# Patient Record
Sex: MALE | Race: WHITE | Employment: FULL TIME | URBAN - METROPOLITAN AREA
[De-identification: names, ages, dates, MRNs, and addresses within clinical notes are randomized per-mention and may not be internally consistent; named-entity substitution may affect disease eponyms.]

---

## 2018-03-19 NOTE — PROGRESS NOTES
Juarez Jauregui is here for a general check up.  He is fasting. He is  up to date on eye exams (glasses) and is due for dental visits. Wears seat belt.--yes Wears bike helmet--na.  Concerns today:    ANDREW Pizarro is new to [facility]. He reports he has been in stable health over the past several years. 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 He is up to date on vaccines. Had clinical chickenpox as a child. Has male partners and has been vaccinated against Hep C but has never had Hep A series. He will start this today. Diet is healthy, meat, grains, lots of dairy.     PREP  [facility] for [facility]. Denies hx of STDs. He reports consistent condom use.     Dislocation of right ankle   He suffered right ankle injury when he was a professional dancer. He did not require surgery. Reports he now has minimal pain. Sometimes ankle pain flares with weather changes, no swelling or warmth.     Seasonal allergies  These flare in spring, summer, fall. Uses flonase nasal spray, no antihistamines. No hx of asthma.  He reports 4 sinus infections since November 2017. He is just finishing a course of amoxicillin and is feeling better today. Has not seen ENT due to insurance issues in the past. Would like a referral to ENT.     Neck pain  He notes painless clicking (annoying) of his neck and tight muscles in his neck and upper back since November 2017. No injury, feels stiff. No tension headache. Tried massage with temporary relief. Does weight lifting, no compromised activity.  Dominant right hand.     Anxiety  Had taken lorazepam on prn basis. He usually uses 15 pills in 6 months. Hx of panic attacks. Had been on zoloft in the past and had side effects, got bad dizzy spells. Does not currently see a therapist but would like a referral. Reports he was hospitalized for anxiety, panic attack in 2015. Denies illicit drug use, no heavy alcohol use.    PHQ9 score = 0  LEONOR 7 score = 3    Hx of migraine headaches  He reports 3-4 migraines per year. These began in his 20's. It starts with pain behind his left eye, aura, escalating pain, photophobia and sensitivity to sound, nausea. Triggers are poor sleep, stress. He usually takes Excedrin migraine with good relief.     Immunizations  Tdap 9/2015 (ANNIKA)  Clinical chicken pox age 2  Hep B series x 3 is completed         Health Maintenance   Topic Date Due     TETANUS IMMUNIZATION (SYSTEM ASSIGNED)  06/20/2003     INFLUENZA VACCINE (SYSTEM ASSIGNED)  09/01/2018     Patient Active Problem List   Diagnosis     Strain of neck muscle, initial encounter     Acute recurrent maxillary sinusitis     Anxiety     History of migraine     Chronic seasonal allergic rhinitis       Past Surgical History:   Procedure Laterality Date     DENTAL SURGERY         Family History   Problem Relation Age of Onset     Pre-Diabetes Mother      Parkinsonism Maternal Grandmother      Heart Failure Maternal Grandmother      Arrhythmia Maternal Grandmother      on blood thinners     CANCER Maternal Grandmother      TOB, ? esophageal     Dementia Maternal Grandfather      Prostate Cancer Maternal Grandfather        Social  Single   of [employer]    HABITS:  Tob: nonsmoker  ETOH: 2 per week  Calcium: 3+/day  Caffeine: 1/day  Exercise: 5-6 x per week    MALE ROS  Partner: male  ED: none  Contraception: condoms  STD concerns: none  BPH: no symptoms      Current Outpatient Prescriptions   Medication Sig Dispense Refill     fluticasone (FLONASE) 50 MCG/ACT nasal spray Spray 2 sprays into both nostrils daily       LORazepam (ATIVAN) 1 MG tablet Take 1 tablet (1 mg) by mouth every 8 hours as needed for anxiety 10 tablet 0     Allergies   Allergen Reactions     Bacitracin      Neosporin Af [Miconazole]          ROS  CONSTITUTIONAL:NEGATIVE for fever, chills, change in weight  INTEGUMENTARY/SKIN: NEGATIVE for worrisome  "rashes, moles or lesions  EYES: NEGATIVE for vision changes or irritation  ENT/MOUTH: NEGATIVE for ear, mouth and throat problems, seasonal allergies  RESP:NEGATIVE for significant cough or SOB  CV: NEGATIVE for chest pain, palpitations, SHERWOOD, orthopnea, PND  or peripheral edema  GI: NEGATIVE for nausea, abdominal pain, heartburn, or change in bowel habits  :NEGATIVE for frequency, dysuria, or hematuria  MUSCULOSKELETAL:shoulder and neck strain, hx of ankle pain.   NEURO: NEGATIVE for weakness, dizziness or paresthesias, positive hx of migraines  ENDOCRINE: NEGATIVE for polyuria/dipsia,  temperature intolerance, skin/hair changes  HEME/ALLERGY/IMMUNE: NEGATIVE for bleeding problems  PSYCHIATRIC: NEGATIVE for changes in mood or affect, positive hx of anxiety, as above    EXAM  /85  Pulse 62  Temp 98.2  F (36.8  C) (Oral)  Ht 5' 10.08\" (178 cm)  Wt 179 lb (81.2 kg)  SpO2 98%  BMI 25.63 kg/m2      GENERAL APPEARANCE: Alert, pleasant, NAD  EYES: PERRL, EOMI, conjunctiva clear  HENT: TM normal bilaterally. Nose and mouth without lesions  NECK: no adenopathy, thyroid normal to palpation  RESP: lungs clear to auscultation bilaterally  Axillae: no palpable axillary masses or adenopathy  CV: regular rate and rhythm, normal S1 S2, no murmur, no carotid bruits  ABDOMEN: soft, nontender, without HSM or masses. Bowel sounds normal  : not examined  Rectal exam: deferred  MS: extremities normal- no gross deformities noted, no tender, hot or swollen joints.  Point tenderness with palpation over left paracervical and left trapezius muscle  SKIN: no suspicious lesions or rashes  NEURO: Normal strength and tone, sensory exam grossly normal, DTR normoreflexive in upper and lower extremities  PSYCH: mentation appears normal. and affect normal/bright.  EXT: no peripheral edema, pedal pulses palpable    Assessment:  (Z00.00) Routine history and physical examination of adult  (primary encounter diagnosis)  Comment: healthy " 31 yo male  Plan: Lipid Profile, CBC with platelets, Basic         Metabolic Panel ([place])        Anticipatory guidance given today regarding diet, exercise, calcium intake. Vaccines are up to date but I recommended Hep A series. He agrees to this today    (F41.9) Anxiety  Comment: he is interested in seeing a counselor , he uses lorazepam sparingly  Plan: LORazepam (ATIVAN) 1 MG tablet        Gave info on [employer]. Filled 20 pills of lorazepam    (Z23) Vaccine for viral hepatitis  Comment: he has male partners  Plan: HEPA VACCINE ADULT IM        Recommend hep A series. [facility], discussed need for compliance with condom use.   Return in 6 months for second Hep A vaccine    (J01.01) Acute recurrent maxillary sinusitis  Comment: he reports 4 episodes since November  Plan: OTOLARYNGOLOGY REFERRAL        Refer to ENT for evaluation, treatment, no symptoms today    (S16.1XXA) Strain of neck muscle, initial encounter  Comment: left side  Plan: [facility], HAND, AND CHIROPRACTIC REFERRAL        Recommend myofascial work with PT, referral is entered    Danielle Mitchell MD  Internal Medicine/Pediatrics

## 2018-03-20 ENCOUNTER — OFFICE VISIT (OUTPATIENT)
Dept: FAMILY MEDICINE | Facility: CLINIC | Age: 33
End: 2018-03-20
Payer: COMMERCIAL

## 2018-03-20 VITALS
DIASTOLIC BLOOD PRESSURE: 85 MMHG | BODY MASS INDEX: 25.62 KG/M2 | HEART RATE: 62 BPM | TEMPERATURE: 98.2 F | WEIGHT: 179 LBS | HEIGHT: 70 IN | SYSTOLIC BLOOD PRESSURE: 122 MMHG | OXYGEN SATURATION: 98 %

## 2018-03-20 DIAGNOSIS — S16.1XXA STRAIN OF NECK MUSCLE, INITIAL ENCOUNTER: ICD-10-CM

## 2018-03-20 DIAGNOSIS — J30.2 ACUTE SEASONAL ALLERGIC RHINITIS DUE TO OTHER ALLERGEN: ICD-10-CM

## 2018-03-20 DIAGNOSIS — Z23 VACCINE FOR VIRAL HEPATITIS: ICD-10-CM

## 2018-03-20 DIAGNOSIS — Z00.00 ROUTINE HISTORY AND PHYSICAL EXAMINATION OF ADULT: Primary | ICD-10-CM

## 2018-03-20 DIAGNOSIS — J01.01 ACUTE RECURRENT MAXILLARY SINUSITIS: ICD-10-CM

## 2018-03-20 DIAGNOSIS — F41.9 ANXIETY: ICD-10-CM

## 2018-03-20 PROBLEM — Z86.69 HISTORY OF MIGRAINE: Status: ACTIVE | Noted: 2018-03-20

## 2018-03-20 LAB
BUN SERPL-MCNC: 11 MG/DL (ref 5–24)
CALCIUM SERPL-MCNC: 9.1 MG/DL (ref 8.5–10.4)
CHLORIDE SERPLBLD-SCNC: 102 MMOL/L (ref 94–109)
CHOLEST SERPL-MCNC: 153 MG/DL
CO2 SERPL-SCNC: 25 MMOL/L (ref 20–32)
CREAT SERPL-MCNC: 1.3 MG/DL (ref 0.8–1.5)
EGFR CALCULATED (BLACK REFERENCE): 82.3
EGFR CALCULATED (NON BLACK REFERENCE): 68
ERYTHROCYTE [DISTWIDTH] IN BLOOD BY AUTOMATED COUNT: 13.8 % (ref 10–15)
GLUCOSE SERPL-MCNC: 93 MG/DL (ref 60–109)
HCT VFR BLD AUTO: 47.6 % (ref 40–53)
HDLC SERPL-MCNC: 32 MG/DL
HGB BLD-MCNC: 15.8 G/DL (ref 13.3–17.7)
LDLC SERPL CALC-MCNC: 88 MG/DL
MCH RBC QN AUTO: 31 PG (ref 26.5–33)
MCHC RBC AUTO-ENTMCNC: 33.2 G/DL (ref 31.5–36.5)
MCV RBC AUTO: 94 FL (ref 78–100)
NONHDLC SERPL-MCNC: 121 MG/DL
PLATELET # BLD AUTO: 298 10E9/L (ref 150–450)
POTASSIUM SERPL-SCNC: 4.2 MMOL/L (ref 3.4–5.3)
RBC # BLD AUTO: 5.09 10E12/L (ref 4.4–5.9)
SODIUM SERPL-SCNC: 139 MMOL/L (ref 133–144)
TRIGL SERPL-MCNC: 165 MG/DL
WBC # BLD AUTO: 6.1 10E9/L (ref 4–11)

## 2018-03-20 RX ORDER — LORAZEPAM 1 MG/1
1 TABLET ORAL EVERY 8 HOURS PRN
Qty: 20 TABLET | Refills: 0 | Status: SHIPPED | OUTPATIENT
Start: 2018-03-20 | End: 2019-01-25

## 2018-03-20 NOTE — LETTER
[employer] (ac[employer] in 2014) Customer Service  [facility]  [address] , [address]  [place] [address]  Fax: [phone]  Phone: [phone]      2018      Juarez Jauregui  [facility] [place] [facility] [address]        Dear Juarez,    Thank you for your interest in becoming a [employer] ([employer] in 2014) user!    Your access code is: [email or web address]  Expires: 2018  8:08 AM     Please access the [employer] ([employer] in 2014) website at [email or web address].  Below the ID and password fields, select the  Sign Up Now  as New User.  You will be prompted to enter the access code listed above as well as additional personal information.  Please follow the directions carefully when creating your username and password.    If you allow your access code to , or if you have any questions please call a [employer] in 2014[employer] Representative at [phone] during normal clinic hours.     Sincerely,      [employer] ([employer] in 2014) Customer Service  [facility]

## 2018-03-20 NOTE — NURSING NOTE
Screening Questionnaire for Adult Immunization    Are you sick today?   No   Do you have allergies to medications, food, a vaccine component or latex?   No   Have you ever had a serious reaction after receiving a vaccination?   No   Do you have a long-term health problem with heart disease, lung disease, asthma, kidney disease, metabolic disease (e.g. diabetes), anemia, or other blood disorder?   No   Do you have cancer, leukemia, HIV/AIDS, or any other immune system problem?   No   In the past 3 months, have you taken medications that affect  your immune system, such as prednisone, other steroids, or anticancer drugs; drugs for the treatment of rheumatoid arthritis, Crohn s disease, or psoriasis; or have you had radiation treatments?   No   Have you had a seizure, or a brain or other nervous system problem?   No   During the past year, have you received a transfusion of blood or blood     products, or been given immune (gamma) globulin or antiviral drug?   No   For women: Are you pregnant or is there a chance you could become        pregnant during the next month?   No   Have you received any vaccinations in the past 4 weeks?   No     Immunization questionnaire answers were all negative.        Given by Zulema Gandhi. Patient instructed to remain in clinic for 15 minutes afterwards, and to report any adverse reaction to me immediately.       Screening performed by Zulema Gandhi on 3/20/2018 at 10:09 AM.

## 2018-03-20 NOTE — NURSING NOTE
"32 year old  Chief Complaint   Patient presents with     [facility] Care     Physical       Blood pressure 122/85, pulse 62, temperature 98.2  F (36.8  C), temperature source Oral, height 5' 10.08\" (178 cm), weight 179 lb (81.2 kg), SpO2 98 %. Body mass index is 25.63 kg/(m^2).  There is no problem list on file for this patient.      Wt Readings from Last 2 Encounters:   03/20/18 179 lb (81.2 kg)   05/24/15 160 lb (72.6 kg)     BP Readings from Last 3 Encounters:   03/20/18 122/85   05/24/15 (!) 116/92         Current Outpatient Prescriptions   Medication     fluticasone (FLONASE) 50 MCG/ACT nasal spray     LORazepam (ATIVAN) 1 MG tablet     No current facility-administered medications for this visit.        Social History   Substance Use Topics     Smoking status: Never Smoker     Smokeless tobacco: Never Used     Alcohol use Yes      Comment: social       Health Maintenance Due   Topic Date Due     TETANUS IMMUNIZATION (SYSTEM ASSIGNED)  06/20/2003       No results found for: PAP      March 20, 2018 8:44 AM  "

## 2018-03-20 NOTE — LETTER
[facility]  [address]  [facility] [address]  Phone: [phone]  Fax: [phone]       Date: March 21, 2018      Juarez Jauregui  [address]  [facility] [address]        Dear Juarez,     Enclosed are you recent test results.    Your cholesterol panel looks very good. Total and LDL cholesterol numbers are low. Your triglycerides are just a bit high but OK. HDL is low. You may have inherited this. Exercise usually raises it.    Your blood count profile looks perfect, there is no anemia or other abnormal findings.    Your glucose and kidney function look normal.    I am enclosing a prescription for Flonase, hopefully this is covered by your insurance.    Thanks for choosing [facility] for your care.    Sincerely,    Danielle Mitchell MD  Internal Medicine/Pediatrics    Resulted Orders   Lipid Profile   Result Value Ref Range    Cholesterol 153 <200 mg/dL    Triglycerides 165 (H) <150 mg/dL      Comment:      Borderline high:  150-199 mg/dl  High:             200-499 mg/dl  Very high:       >499 mg/dl      HDL Cholesterol 32 (L) >39 mg/dL    LDL Cholesterol Calculated 88 <100 mg/dL      Comment:      Desirable:       <100 mg/dl    Non HDL Cholesterol 121 <130 mg/dL   CBC with platelets   Result Value Ref Range    WBC 6.1 4.0 - 11.0 10e9/L    RBC Count 5.09 4.4 - 5.9 10e12/L    Hemoglobin 15.8 13.3 - 17.7 g/dL    Hematocrit 47.6 40.0 - 53.0 %    MCV 94 78 - 100 fl    MCH 31.0 26.5 - 33.0 pg    MCHC 33.2 31.5 - 36.5 g/dL    RDW 13.8 10.0 - 15.0 %    Platelet Count 298 150 - 450 10e9/L   Basic Metabolic Panel ([place])   Result Value Ref Range    Glucose 93.0 60.0 - 109.0 mg/dL    Urea Nitrogen 11.0 5.0 - 24.0 mg/dL    Calcium 9.1 8.5 - 10.4 mg/dL    Creatinine 1.3 0.8 - 1.5 mg/dL    eGFR Calculated (Non Black Reference) 68.0 >60.0    eGFR Calculated (Black Reference) 82.3 >60.0    Sodium 139.0 133.0 - 144.0 mmol/L    Potassium  4.2 3.4 - 5.3 mmol/L    Chloride 102.0 94.0 - 109.0 mmol/L    Carbon Dioxide 25.0 20.0 - 32.0 mmol/L

## 2018-03-20 NOTE — MR AVS SNAPSHOT
After Visit Summary   3/20/2018    Juarez Jauregui    MRN: [id number]           Patient Information     Date Of Birth          1985        Visit Information        Provider Department      3/20/2018 8:40 AM Danielle Mitchell MD [facility]        Today's Diagnoses     Routine history and physical examination of adult    -  1    Anxiety        Vaccine for viral hepatitis        Acute recurrent maxillary sinusitis        Strain of neck muscle, initial encounter           Follow-ups after your visit        Additional Services     [facility], HAND, AND CHIROPRACTIC REFERRAL       **This order will print in the [facility] Scheduling Office**    Physical Therapy, Hand Therapy and Chiropractic Care are available through:    *[facility]  *[facility]  *[facility]    Call one number to schedule at any of the above locations: [phone].    Your provider has referred you to: Physical Therapy at [facility] or [facility]    Indication/Reason for Referral: Neck Pain  Onset of Illness: months  Therapy Orders: Evaluate and Treat  Special Programs: None  Special Request: Manual Therapy: Myofascial Release/Massage  None    Yvette Crawford      Additional Comments for the Therapist or Chiropractor:     Please be aware that coverage of these services is subject to the terms and limitations of your health insurance plan.  Call member services at your health plan with any benefit or coverage questions.      Please bring the following to your appointment:    *Your personal calendar for scheduling future appointments  *Comfortable clothing            OTOLARYNGOLOGY REFERRAL       Your provider has referred you to: [facility] (Otolaryngology) - [place] [phone]  [email or web address]    Please be aware that coverage of these services is subject to the terms and limitations of your health insurance  "plan.  Call member services at your health plan with any benefit or coverage questions.      Please bring the following with you to your appointment:    (1) Any X-Rays, CTs or MRIs which have been performed.  Contact the facility where they were done to arrange for  prior to your scheduled appointment.   (2) List of current medications  (3) This referral request   (4) Any documents/labs given to you for this referral                  Who to contact     Please call your clinic at [phone] to:    Ask questions about your health    Make or cancel appointments    Discuss your medicines    Learn about your test results    Speak to your doctor            Additional Information About Your Visit        [employer] Information     EQO is an electronic gateway that provides easy, online access to your medical records. With EQO, you can request a clinic appointment, read your test results, renew a prescription or communicate with your care team.     To sign up for [employer] visit the website at [email or web address]   You will be asked to enter the access code listed below, as well as some personal information. Please follow the directions to create your username and password.     Your access code is: [email or web address]  Expires: 2018  8:08 AM     Your access code will  in 90 days. If you need help or a new code, please contact your [facility] or call [phone] for assistance.        Care EveryWhere ID     This is your Care EveryWhere ID. This could be used by other organizations to access your [facility] medical records  [id number]        Your Vitals Were     Pulse Temperature Height Pulse Oximetry BMI (Body Mass Index)       62 98.2  F (36.8  C) (Oral) 5' 10.08\" (178 cm) 98% 25.63 kg/m2        Blood Pressure from Last 3 Encounters:   18 122/85   05/24/15 (!) 116/92    Weight from Last 3 Encounters:   18 179 lb (81.2 kg)   05/24/15 160 lb (72.6 kg)         "      We Performed the Following     Basic Metabolic Panel ([place])     CBC with platelets     HEPA VACCINE ADULT IM     [facility], HAND, AND CHIROPRACTIC REFERRAL     Lipid Profile     OTOLARYNGOLOGY REFERRAL          Today's Medication Changes          These changes are accurate as of 3/20/18  9:41 AM.  If you have any questions, ask your nurse or doctor.               These medicines have changed or have updated prescriptions.        Dose/Directions    * LORazepam 1 MG tablet   Commonly known as:  ATIVAN   This may have changed:  Another medication with the same name was added. Make sure you understand how and when to take each.   Changed by:  Danielle Mitchell MD        Dose:  1 mg   Take 1 tablet (1 mg) by mouth every 8 hours as needed for anxiety   Quantity:  10 tablet   Refills:  0       * LORazepam 1 MG tablet   Commonly known as:  ATIVAN   This may have changed:  You were already taking a medication with the same name, and this prescription was added. Make sure you understand how and when to take each.   Used for:  Anxiety   Changed by:  Danielle Mitchell MD        Dose:  1 mg   Take 1 tablet (1 mg) by mouth every 8 hours as needed for anxiety   Quantity:  20 tablet   Refills:  0       * Notice:  This list has 2 medication(s) that are the same as other medications prescribed for you. Read the directions carefully, and ask your doctor or other care provider to review them with you.         Where to get your medicines      Some of these will need a paper prescription and others can be bought over the counter.  Ask your nurse if you have questions.     Bring a paper prescription for each of these medications     LORazepam 1 MG tablet                Primary Care Provider Office Phone # Fax #    Danielel Mitchell [phone] [address] [place] [address]        Equal Access to Services     [facility] JESSIE AH: yokasta Crouch qaybta  edd gallagherconstantin castillo. [facility] [phone].    ATENCIÓN: Si brian gabriel, tiene a serrano disposición servicios gratuitos de asistencia lingüística. Jv al [phone].    We comply with applicable federal civil rights laws and [place] laws. We do not discriminate on the basis of race, color, national origin, age, disability, sex, sexual orientation, or gender identity.            Thank you!     Thank you for choosing [facility]  for your care. Our goal is always to provide you with excellent care. Hearing back from our patients is one way we can continue to improve our services. Please take a few minutes to complete the written survey that you may receive in the mail after your visit with us. Thank you!             Your Updated Medication List - Protect others around you: Learn how to safely use, store and throw away your medicines at www.disposemymeds.org.          This list is accurate as of 3/20/18  9:41 AM.  Always use your most recent med list.                   Brand Name Dispense Instructions for use Diagnosis    creatine 400 MG capsule     90 capsule    Take 3 po BID        fluticasone 50 MCG/ACT spray    FLONASE     Spray 2 sprays into both nostrils daily        * LORazepam 1 MG tablet    ATIVAN    10 tablet    Take 1 tablet (1 mg) by mouth every 8 hours as needed for anxiety        * LORazepam 1 MG tablet    ATIVAN    20 tablet    Take 1 tablet (1 mg) by mouth every 8 hours as needed for anxiety    Anxiety       study - emtricitabine-tenofovir 200-300 (IDS#4299) per tablet    TRUVADA    30 tablet    Take 1 tablet by mouth daily        * Notice:  This list has 2 medication(s) that are the same as other medications prescribed for you. Read the directions carefully, and ask your doctor or other care provider to review them with you.

## 2018-03-21 RX ORDER — FLUTICASONE PROPIONATE 50 MCG
2 SPRAY, SUSPENSION (ML) NASAL DAILY
Qty: 1 BOTTLE | Refills: 11 | Status: SHIPPED | OUTPATIENT
Start: 2018-03-21 | End: 2019-07-05

## 2018-03-22 ASSESSMENT — ANXIETY QUESTIONNAIRES
IF YOU CHECKED OFF ANY PROBLEMS ON THIS QUESTIONNAIRE, HOW DIFFICULT HAVE THESE PROBLEMS MADE IT FOR YOU TO DO YOUR WORK, TAKE CARE OF THINGS AT HOME, OR GET ALONG WITH OTHER PEOPLE: SOMEWHAT DIFFICULT
1. FEELING NERVOUS, ANXIOUS, OR ON EDGE: SEVERAL DAYS
6. BECOMING EASILY ANNOYED OR IRRITABLE: NOT AT ALL
7. FEELING AFRAID AS IF SOMETHING AWFUL MIGHT HAPPEN: NOT AT ALL
2. NOT BEING ABLE TO STOP OR CONTROL WORRYING: SEVERAL DAYS
5. BEING SO RESTLESS THAT IT IS HARD TO SIT STILL: NOT AT ALL
GAD7 TOTAL SCORE: 3
3. WORRYING TOO MUCH ABOUT DIFFERENT THINGS: SEVERAL DAYS

## 2018-03-22 ASSESSMENT — PATIENT HEALTH QUESTIONNAIRE - PHQ9: 5. POOR APPETITE OR OVEREATING: NOT AT ALL

## 2018-03-23 ASSESSMENT — ANXIETY QUESTIONNAIRES: GAD7 TOTAL SCORE: 3

## 2018-03-23 ASSESSMENT — PATIENT HEALTH QUESTIONNAIRE - PHQ9: SUM OF ALL RESPONSES TO PHQ QUESTIONS 1-9: 0

## 2018-04-03 ENCOUNTER — THERAPY VISIT (OUTPATIENT)
Dept: PHYSICAL THERAPY | Facility: CLINIC | Age: 33
End: 2018-04-03
Payer: COMMERCIAL

## 2018-04-03 DIAGNOSIS — M54.2 NECK PAIN: Primary | ICD-10-CM

## 2018-04-03 PROCEDURE — 97110 THERAPEUTIC EXERCISES: CPT | Mod: GP | Performed by: PHYSICAL THERAPIST

## 2018-04-03 PROCEDURE — 97161 PT EVAL LOW COMPLEX 20 MIN: CPT | Mod: GP | Performed by: PHYSICAL THERAPIST

## 2018-04-03 NOTE — PROGRESS NOTES
"[facility] Initial Evaluation  Subjective:  Patient is a 32 year old male presenting with rehab cervical spine hpi. The history is provided by the patient. No  was used.   Juarez Jauregui is a 32 year old male with a cervical spine condition.  Condition occurred with:  Insidious onset.  Condition occurred: for unknown reasons.  This is a new condition  Patient reports a gradual onset on left sided neck pain beginning about December 2016.  Patient c/o pain when turning head toward the left.  Patient c/o upper arm numbness when lifting at the gym such as an overhead pulldown.  Physical therapy was ordered 3/20/2018.    Patient reports pain:  Cervical left side.  Radiates to:  Shoulder left.  Pain is described as aching (\"muscle knot\") and is constant and reported as 6/10.  Associated symptoms:  Loss of motion/stiffness. Pain is worse in the A.M..  Symptoms are exacerbated by rotating head and lifting and relieved by NSAID's and other (biofreeze).  Since onset symptoms are gradually worsening.  Special testing: none.  Previous treatment: none.    General health as reported by patient is good.  Pertinent medical history includes:  Depression.    Other surgeries include:  Other.  Current medications:  Other.  Current occupation is Product Management.  Patient is working in normal job without restrictions.      Barriers include:  None as reported by the patient.    Red flags:  None as reported by the patient.                        Objective:  Standing Alignment:    Cervical/Thoracic:  Forward head and thoracic kyphosis increased (Head slightly tilted toward R)  Shoulder/UE:  Rounded shoulders, protracted scapula L and protracted scapula R                                  Cervical/Thoracic Evaluation    AROM:  AROM Cervical:    Flexion:            Mod Loss  Extension:       Min Loss (movement primarily upper cervical), Mod Loss retraction  Rotation:         Left: 25% - ROM improves " with repeated retraction     Right: WNL  Side Bend:      Left: Mod Loss     Right:  Min Loss    Strength: Fair deep neck flexor strength    Cervical Myotomes:  not assessed                        Cervical Palpation:        Tenderness not present at Right:      Scalenes; Upper Trap; Levator; Erector Spinae or Suboccipitals      Spinal Segmental Conclusions:  Lower cervical hypomobility                                                  General     ROS    Assessment/Plan:    Patient is a 32 year old male with cervical complaints.    Patient has the following significant findings with corresponding treatment plan.                Diagnosis 1:  Cervical derangement  Pain -  self management, education, directional preference exercise and home program  Decreased ROM/flexibility - manual therapy, therapeutic exercise, therapeutic activity and home program  Decreased joint mobility - manual therapy, therapeutic exercise, therapeutic activity and home program  Decreased strength - therapeutic exercise, therapeutic activities and home program  Impaired muscle performance - neuro re-education and home program  Decreased function - therapeutic activities and home program  Impaired posture - neuro re-education, therapeutic activities and home program    Therapy Evaluation Codes:   1) History comprised of:   Personal factors that impact the plan of care:      None.    Comorbidity factors that impact the plan of care are:      Depression.     Medications impacting care: Other.  2) Examination of Body Systems comprised of:   Body structures and functions that impact the plan of care:      Cervical spine.   Activity limitations that impact the plan of care are:      Driving, Lifting and Reading/Computer work.  3) Clinical presentation characteristics are:   Stable/Uncomplicated.  4) Decision-Making    Low complexity using standardized patient assessment instrument and/or measureable assessment of functional outcome.  Cumulative Therapy  Evaluation is: Low complexity.    Previous and current functional limitations:  (See Goal Flow Sheet for this information)    Short term and Long term goals: (See Goal Flow Sheet for this information)     Communication ability:  Patient appears to be able to clearly communicate and understand verbal and written communication and follow directions correctly.  Treatment Explanation - The following has been discussed with the patient:   RX ordered/plan of care  Anticipated outcomes  Possible risks and side effects  This patient would benefit from PT intervention to resume normal activities.   Rehab potential is good.    Frequency:  1 X week, once daily  Duration:  for 6 weeks  Discharge Plan:  Achieve all LTG.  Independent in home treatment program.  Reach maximal therapeutic benefit.    Please refer to the daily flowsheet for treatment today, total treatment time and time spent performing 1:1 timed codes.

## 2018-04-03 NOTE — MR AVS SNAPSHOT
"              After Visit Summary   4/3/2018    Juarez Jauregui    MRN: [id number]           Patient Information     Date Of Birth          1985        Visit Information        Provider Department      4/3/2018 4:50 PM Hawk Buckley, PT [facility] Physical Therapy        Today's Diagnoses     Neck pain    -  1       Follow-ups after your visit        Your next 10 appointments already scheduled     May 07, 2018  5:45 PM CDT   (Arrive by 5:30 PM)   NEW RHINOLOGY with Tiffanie Ybarra MD   [facility] Ear Nose and Throat ([facility] [place])    [address] [place] [address]  4th [facility] [address] [phone]              Who to contact     If you have questions or need follow up information about today's clinic visit or your schedule please contact [place] [facility] directly at [phone].  Normal or non-critical lab and imaging results will be communicated to you by MyChart, letter or phone within 4 business days after the clinic has received the results. If you do not hear from us within 7 days, please contact the clinic through POPSUGARhart or phone. If you have a critical or abnormal lab result, we will notify you by phone as soon as possible.  Submit refill requests through Jymob or call your pharmacy and they will forward the refill request to us. Please allow 3 business days for your refill to be completed.          Additional Information About Your Visit        MyChart Information     Jymob lets you send messages to your doctor, view your test results, renew your prescriptions, schedule appointments and more. To sign up, go to [email or web address] . Click on \"Log in\" on the left side of the screen, which will take you to the Welcome page. Then click on \"Sign up Now\" on the right side of the page.     You will be asked to enter the access code listed below, as well as some personal information. " Please follow the directions to create your username and password.     Your access code is: [email or web address]  Expires: 2018  8:08 AM     Your access code will  in 90 days. If you need help or a new code, please call your [place] clinic or [phone].        Care EveryWhere ID     This is your Care EveryWhere ID. This could be used by other organizations to access your [place] medical records  [id number]         Blood Pressure from Last 3 Encounters:   18 122/85   05/24/15 (!) 116/92    Weight from Last 3 Encounters:   18 81.2 kg (179 lb)   05/24/15 72.6 kg (160 lb)              We Performed the Following     HC PT EVAL, LOW COMPLEXITY     ALEXIA INITIAL EVAL REPORT     THERAPEUTIC EXERCISES        Primary Care Provider Office Phone # Fax #    Danielle Allyson Mitchell [phone]       [address] [place] [address]        Equal Access to Services     WALTER ROMAN : Hadii aad ku hadasho Soomaali, waaxda luqadaha, qaybta kaalmada adeegyada, waxay idiin hayaan shashank goss . So [facility] [phone].    ATENCIÓN: Si habla español, tiene a serrano disposición servicios gratuitos de asistencia lingüística. Ll[facility] [phone].    We comply with applicable federal civil rights laws and [place] laws. We do not discriminate on the basis of race, color, national origin, age, disability, sex, sexual orientation, or gender identity.            Thank you!     Thank you for choosing [facility] PH[facility]ER[facility]  for your care. Our goal is always to provide you with excellent care. Hearing back from our patients is one way we can continue to improve our services. Please take a few minutes to complete the written survey that you may receive in the mail after your visit with us. Thank you!             Your Updated Medication List - Protect others around you: Learn how to safely use, store and throw away your medicines at www.disposemymeds.org.           This list is accurate as of 4/3/18  5:33 PM.  Always use your most recent med list.                   Brand Name Dispense Instructions for use Diagnosis    creatine 400 MG capsule     90 capsule    Take 3 po BID        * fluticasone 50 MCG/ACT spray    FLONASE     Spray 2 sprays into both nostrils daily        * fluticasone 50 MCG/ACT spray    FLONASE    1 Bottle    Spray 2 sprays into both nostrils daily    Acute seasonal allergic rhinitis due to other allergen       * LORazepam 1 MG tablet    ATIVAN    10 tablet    Take 1 tablet (1 mg) by mouth every 8 hours as needed for anxiety        * LORazepam 1 MG tablet    ATIVAN    20 tablet    Take 1 tablet (1 mg) by mouth every 8 hours as needed for anxiety    Anxiety       study - emtricitabine-tenofovir 200-300 (IDS#4299) per tablet    TRUVADA    30 tablet    Take 1 tablet by mouth daily        * Notice:  This list has 4 medication(s) that are the same as other medications prescribed for you. Read the directions carefully, and ask your doctor or other care provider to review them with you.

## 2018-04-18 ENCOUNTER — THERAPY VISIT (OUTPATIENT)
Dept: PHYSICAL THERAPY | Facility: CLINIC | Age: 33
End: 2018-04-18
Payer: COMMERCIAL

## 2018-04-18 DIAGNOSIS — M54.2 NECK PAIN: ICD-10-CM

## 2018-04-18 PROCEDURE — 97112 NEUROMUSCULAR REEDUCATION: CPT | Mod: GP | Performed by: PHYSICAL THERAPIST

## 2018-04-18 PROCEDURE — 97110 THERAPEUTIC EXERCISES: CPT | Mod: GP | Performed by: PHYSICAL THERAPIST

## 2018-04-24 ENCOUNTER — THERAPY VISIT (OUTPATIENT)
Dept: PHYSICAL THERAPY | Facility: CLINIC | Age: 33
End: 2018-04-24
Payer: COMMERCIAL

## 2018-04-24 DIAGNOSIS — M54.2 NECK PAIN: ICD-10-CM

## 2018-04-24 PROCEDURE — 97110 THERAPEUTIC EXERCISES: CPT | Mod: GP | Performed by: PHYSICAL THERAPIST

## 2018-04-30 NOTE — TELEPHONE ENCOUNTER
FUTURE VISIT INFORMATION      FUTURE VISIT INFORMATION:    Date: 05/07/2018    Time: 5:45    Location: [facility] [place]  REFERRAL INFORMATION:    Referring provider:  Dr Mitchell    Referring providers clinic:  [facility]    Reason for visit/diagnosis  Acute recurrent maxillary sinusitis    RECORDS REQUESTED FROM:       Clinic name Comments Records Status Imaging Status   [facility] OFFICEV VISIT: 03/20/2017 INTERNAL NONE                                   RECORDS STATUS

## 2018-05-07 ENCOUNTER — OFFICE VISIT (OUTPATIENT)
Dept: OTOLARYNGOLOGY | Facility: CLINIC | Age: 33
End: 2018-05-07
Payer: COMMERCIAL

## 2018-05-07 ENCOUNTER — RADIANT APPOINTMENT (OUTPATIENT)
Dept: CT IMAGING | Facility: CLINIC | Age: 33
End: 2018-05-07
Attending: OTOLARYNGOLOGY
Payer: COMMERCIAL

## 2018-05-07 ENCOUNTER — PRE VISIT (OUTPATIENT)
Dept: OTOLARYNGOLOGY | Facility: CLINIC | Age: 33
End: 2018-05-07

## 2018-05-07 VITALS — WEIGHT: 182 LBS | BODY MASS INDEX: 26.05 KG/M2 | HEIGHT: 70 IN

## 2018-05-07 DIAGNOSIS — J01.01 ACUTE RECURRENT MAXILLARY SINUSITIS: ICD-10-CM

## 2018-05-07 DIAGNOSIS — J01.01 ACUTE RECURRENT MAXILLARY SINUSITIS: Primary | ICD-10-CM

## 2018-05-07 ASSESSMENT — PAIN SCALES - GENERAL: PAINLEVEL: NO PAIN (0)

## 2018-05-07 NOTE — NURSING NOTE
Chief Complaint   Patient presents with     Consult     acute recurrent maxillary sinusitis      Zuly Payne Medical Assistant

## 2018-05-07 NOTE — LETTER
2018       RE: Juarez Jauregui  [address]  [place] [facility] [address]     Dear Colleague,    Thank you for referring your patient, Juarez Jauregui, to the [facility] at [facility]. Please see a copy of my visit note below.       Otolaryngology Adult Consultation    Patient: Juarez Jauregui   : 1985     Patient presents with:  Consult:   Chief Complaint   Patient presents with     Consult     acute recurrent maxillary sinusitis         Referring Provider: Danielle Mitchell   Consulting Physician:  Tiffanie Ybarra MD       Assessment/Plan: Patient with recurrent acute nasal symptomology suggestive of rhinosinusitis.  Recommend ongoing management of allergic rhinitis.  During next acute exacerbation I would like him to come in for a CT sinus for further evaluation to determine extent of disease.      HPI: Juarez Jauregui is a 32 year old male seen today in the Otolaryngology Clinic in consultation from Dr. Mitchell for a history of recurrent acute sinusitis, 4 episodes this year.  Symptoms include sinus pressure, drainage, congestion. Duration of symptoms has been many years, with recurrent episodes each year. Previous medical therapies tried and their effects include antibiotics with good response, nasal steroids for seasonal allergies. No previous injuries or surgery.  Denies other infections.  Current is back to baseline, no symptoms to suggest infection.      Current Outpatient Prescriptions on File Prior to Visit:  creatine 400 MG capsule Take 3 po BID   fluticasone (FLONASE) 50 MCG/ACT spray Spray 2 sprays into both nostrils daily   LORazepam (ATIVAN) 1 MG tablet Take 1 tablet (1 mg) by mouth every 8 hours as needed for anxiety   study - emtricitabine-tenofovir 200-300, IDS#4299, (TRUVADA) per tablet Take 1 tablet by mouth daily   fluticasone (FLONASE) 50 MCG/ACT nasal spray Spray 2 sprays into both nostrils daily   LORazepam (ATIVAN) 1 MG tablet Take  1 tablet (1 mg) by mouth every 8 hours as needed for anxiety (Patient not taking: Reported on 5/7/2018)     No current facility-administered medications on file prior to visit.        Allergies   Allergen Reactions     Neosporin Af [Miconazole]      Adhesive Tape Rash     Topical creams: neosporin and bacitracin     Bacitracin Rash     Bacitracin-Polymyxin B Rash and GI Disturbance     Triple Antibiotic Rash       Past Medical History:   Diagnosis Date     Prostatitis          Review of Systems   ENT ROS 5/6/2018   Musculoskeletal Neck pain   Allergy/Immunology Allergies or hay fever        14 point ROS neg other than the symptoms noted above.    Physical Exam:    General Assessment   The patient is alert, oriented and in no acute distress.   Head/Face/Scalp  Grossly normal.   Ears  Normal canals, auricles and tympanic membranes.   Nose  External nose is straight, skin is normal. Intranasal exam (anterior rhinoscopy) reveals normal moist mucosa, turbinate tissue without edema, erythema or crusting.  Septum deviated slightly to left, slight dryness of secretions.  Mouth  Oral cavity shows healthy mucosa with out ulceration, masses or other lesions involving the tongue, palate, buccal mucosa, floor of mouth or gingiva.  Dentition in good repair.  Oropharynx with cryptic tonsils, posterior wall and base of tongue.  Neck  No significant adenopathy, thyroid or salivary gland abnormality.         Again, thank you for allowing me to participate in the care of your patient.      Sincerely,    Tiffanie Ybarra MD

## 2018-05-07 NOTE — PROGRESS NOTES
Otolaryngology Adult Consultation    Patient: Juarez Jauregui   : 1985     Patient presents with:  Consult:   Chief Complaint   Patient presents with     Consult     acute recurrent maxillary sinusitis         Referring Provider: Danielle Mitchell   Consulting Physician:  Tiffanie Ybarra MD       Assessment/Plan: Patient with recurrent acute nasal symptomology suggestive of rhinosinusitis.  Recommend ongoing management of allergic rhinitis.  During next acute exacerbation I would like him to come in for a CT sinus for further evaluation to determine extent of disease.      HPI: Juarez Jauregui is a 32 year old male seen today in the Otolaryngology Clinic in consultation from Dr. Mitchell for a history of recurrent acute sinusitis, 4 episodes this year.  Symptoms include sinus pressure, drainage, congestion. Duration of symptoms has been many years, with recurrent episodes each year. Previous medical therapies tried and their effects include antibiotics with good response, nasal steroids for seasonal allergies. No previous injuries or surgery.  Denies other infections.  Current is back to baseline, no symptoms to suggest infection.      Current Outpatient Prescriptions on File Prior to Visit:  creatine 400 MG capsule Take 3 po BID   fluticasone (FLONASE) 50 MCG/ACT spray Spray 2 sprays into both nostrils daily   LORazepam (ATIVAN) 1 MG tablet Take 1 tablet (1 mg) by mouth every 8 hours as needed for anxiety   study - emtricitabine-tenofovir 200-300, IDS#4299, (TRUVADA) per tablet Take 1 tablet by mouth daily   fluticasone (FLONASE) 50 MCG/ACT nasal spray Spray 2 sprays into both nostrils daily   LORazepam (ATIVAN) 1 MG tablet Take 1 tablet (1 mg) by mouth every 8 hours as needed for anxiety (Patient not taking: Reported on 2018)     No current facility-administered medications on file prior to visit.        Allergies   Allergen Reactions     Neosporin Af [Miconazole]      Adhesive Tape Rash     Topical  creams: neosporin and bacitracin     Bacitracin Rash     Bacitracin-Polymyxin B Rash and GI Disturbance     Triple Antibiotic Rash       Past Medical History:   Diagnosis Date     Prostatitis          Review of Systems  [facility] ROS 5/6/2018   Musculoskeletal Neck pain   Allergy/Immunology Allergies or hay fever        14 point ROS neg other than the symptoms noted above.    Physical Exam:    General Assessment   The patient is alert, oriented and in no acute distress.   Head/Face/Scalp  Grossly normal.   Ears  Normal canals, auricles and tympanic membranes.   Nose  External nose is straight, skin is normal. Intranasal exam (anterior rhinoscopy) reveals normal moist mucosa, turbinate tissue without edema, erythema or crusting.  Septum deviated slightly to left, slight dryness of secretions.  Mouth  Oral cavity shows healthy mucosa with out ulceration, masses or other lesions involving the tongue, palate, buccal mucosa, floor of mouth or gingiva.  Dentition in good repair.  Oropharynx with cryptic tonsils, posterior wall and base of tongue.  Neck  No significant adenopathy, thyroid or salivary gland abnormality.

## 2018-05-07 NOTE — PATIENT INSTRUCTIONS
Plan of care:  CT today  We will contact you with results  Clinic contact information:  1. To schedule an appointment call [phone], option 1  2. To talk to the Triage RN call [phone], option 3  3. If you need to speak to Renata or get a message to your doctor on a Friday, call the triage RN  4. RenataRN: [phone]  5. Surgery scheduling:      Karley Singh: [phone]      Danielle Kelley: [phone]  6. Fax: [phone]  7. [phone]

## 2018-05-07 NOTE — MR AVS SNAPSHOT
After Visit Summary   2018    Juarez Jauregui    MRN: [id number]           Patient Information     Date Of Birth          1985        Visit Information        Provider Department      2018 5:45 PM Tiffanie Ybarra MD [facility]        Today's Diagnoses     Acute recurrent maxillary sinusitis    -  1      Care Instructions    Plan of care:  CT today  We will contact you with results  Clinic contact information:  1. To schedule an appointment call [phone], option 1  2. To talk to the Triage RN call [phone], option 3  3. If you need to speak to Renata or get a message to your doctor on a Friday, call the triage RN  4. RenataRN: [phone]  5. Surgery scheduling:      Karley Singh: [phone]      Danielle Kelley: [phone]  6. Fax: [phone]  7. [phone]            Follow-ups after your visit        Your next 10 appointments already scheduled     May 07, 2018  6:40 PM CDT   CT MAXILLOFACIAL W/O CONTRAST with UCCT2   [facility] ([facility])    [address]  [facility] [address] [phone]           Please bring any scans or X-rays taken at other hospitals, if similar tests were done. Also bring a list of your medicines, including vitamins, minerals and over-the-counter drugs. It is safest to leave personal items at home.  Be sure to tell your doctor:   If you have any allergies.   If there s any chance you are pregnant.   If you are breastfeeding.  You do not need to do anything special to prepare for this exam.  Please wear loose clothing, such as a sweat suit or jogging clothes. Avoid snaps, zippers and other metal. We may ask you to undress and put on a hospital gown.            May 09, 2018  7:30 AM CDT   ALEXIA Spine with Courtney Galicia, PT   [place] [facility] ([facility] [place]  )    [address] [place] [address]  "  [phone]              Future tests that were ordered for you today     Open Future Orders        Priority Expected Expires Ordered    CT Maxillofacial w/o Contrast Routine  6/21/2018 5/7/2018            Who to contact     Please call your clinic at [phone] to:    Ask questions about your health    Make or cancel appointments    Discuss your medicines    Learn about your test results    Speak to your doctor            Additional Information About Your Visit        Get[employer] Information     [employer] gives you secure access to your electronic health record. If you see a primary care provider, you can also send messages to your care team and make appointments. If you have questions, please call your primary care clinic.  If you do not have a primary care provider, please call [phone] and they will assist you.      [employer] is an electronic gateway that provides easy, online access to your medical records. With [employer], you can request a clinic appointment, read your test results, renew a prescription or communicate with your care team.     To access your existing account, please contact your [facility] or call [phone] for assistance.        Care EveryWhere ID     This is your Care EveryWhere ID. This could be used by other organizations to access your [place] medical records  [id number]        Your Vitals Were     Height BMI (Body Mass Index)                1.778 m (5' 10\") 26.11 kg/m2           Blood Pressure from Last 3 Encounters:   03/20/18 122/85   05/24/15 (!) 116/92    Weight from Last 3 Encounters:   05/07/18 82.6 kg (182 lb)   03/20/18 81.2 kg (179 lb)   05/24/15 72.6 kg (160 lb)               Primary Care Provider Office Phone # Fax #    Danielle Mitchell [phone]       [address] [place] [address]        Equal Access to Services     WALTER ROMAN AH: yokasta Crouch qaybta kaalmada adeegyada, " edd leoeduard jaime'aan ah. So [facility] [phone].    ATENCIÓN: Si brian gabriel, tiene a serrano disposición servicios gratuitos de asistencia lingüística. Jv abad [phone].    We comply with applicable federal civil rights laws and [place] laws. We do not discriminate on the basis of race, color, national origin, age, disability, sex, sexual orientation, or gender identity.            Thank you!     Thank you for choosing [facility] EAR NOSE AND TH[facility]AT  for your care. Our goal is always to provide you with excellent care. Hearing back from our patients is one way we can continue to improve our services. Please take a few minutes to complete the written survey that you may receive in the mail after your visit with us. Thank you!             Your Updated Medication List - Protect others around you: Learn how to safely use, store and throw away your medicines at www.disposemymeds.org.          This list is accurate as of 5/7/18  6:22 PM.  Always use your most recent med list.                   Brand Name Dispense Instructions for use Diagnosis    creatine 400 MG capsule     90 capsule    Take 3 po BID        * fluticasone 50 MCG/ACT spray    FLONASE     Spray 2 sprays into both nostrils daily        * fluticasone 50 MCG/ACT spray    FLONASE    1 Bottle    Spray 2 sprays into both nostrils daily    Acute seasonal allergic rhinitis due to other allergen       * LORazepam 1 MG tablet    ATIVAN    10 tablet    Take 1 tablet (1 mg) by mouth every 8 hours as needed for anxiety        * LORazepam 1 MG tablet    ATIVAN    20 tablet    Take 1 tablet (1 mg) by mouth every 8 hours as needed for anxiety    Anxiety       study - emtricitabine-tenofovir 200-300 (IDS#4299) per tablet    TRUVADA    30 tablet    Take 1 tablet by mouth daily        * Notice:  This list has 4 medication(s) that are the same as other medications prescribed for you. Read the directions carefully, and ask your doctor or other care provider  to review them with you.

## 2018-05-09 ENCOUNTER — THERAPY VISIT (OUTPATIENT)
Dept: PHYSICAL THERAPY | Facility: CLINIC | Age: 33
End: 2018-05-09
Payer: COMMERCIAL

## 2018-05-09 DIAGNOSIS — M54.2 NECK PAIN: ICD-10-CM

## 2018-05-09 PROCEDURE — 97140 MANUAL THERAPY 1/> REGIONS: CPT | Mod: GP | Performed by: PHYSICAL THERAPIST

## 2018-05-09 PROCEDURE — 97110 THERAPEUTIC EXERCISES: CPT | Mod: GP | Performed by: PHYSICAL THERAPIST

## 2018-05-09 NOTE — MR AVS SNAPSHOT
After Visit Summary   5/9/2018    Juarez Jauregui    MRN: [id number]           Patient Information     Date Of Birth          1985        Visit Information        Provider Department      5/9/2018 7:30 AM Courtney Galicia PT [facility] Physical Therapy        Today's Diagnoses     Neck pain           Follow-ups after your visit        Who to contact     If you have questions or need follow up information about today's clinic visit or your schedule please contact [facility] PHYSICAL THERAPY directly at [phone].  Normal or non-critical lab and imaging results will be communicated to you by [employer], letter or phone within 4 business days after the clinic has received the results. If you do not hear from us within 7 days, please contact the clinic through [employer] or phone. If you have a critical or abnormal lab result, we will notify you by phone as soon as possible.  Submit refill requests through [employer] or call your pharmacy and they will forward the refill request to us. Please allow 3 business days for your refill to be completed.          Additional Information About Your Visit        MyChart Information     [employer] gives you secure access to your electronic health record. If you see a primary care provider, you can also send messages to your care team and make appointments. If you have questions, please call your primary care clinic.  If you do not have a primary care provider, please call [phone] and they will assist you.        Care EveryWhere ID     This is your Care EveryWhere ID. This could be used by other organizations to access your [place] medical records  [id number]         Blood Pressure from Last 3 Encounters:   03/20/18 122/85   05/24/15 (!) 116/92    Weight from Last 3 Encounters:   05/07/18 82.6 kg (182 lb)   03/20/18 81.2 kg (179 lb)   05/24/15 72.6 kg (160 lb)              We Performed the Following      Manual Ther Tech, 1+Regions, EA 15 min     Therapeutic Exercises        Primary Care Provider Office Phone # Fax #    Danielle Mitchell [phone]        [address] [place] [address]        Equal Access to Services     WALTER ROMAN : Samantha saldaña charleeno Sopreciousali, waaxda luqadaha, qaybta kaalmada adeconstantinyada, edd madisonin hayaaeduard normanconstantin rao wolfgang . So [facility] [phone].    ATENCIÓN: Si habla español, tiene a serrano disposición servicios gratuitos de asistencia lingüística. [facility] [phone].    We comply with applicable federal civil rights laws and [place] laws. We do not discriminate on the basis of race, color, national origin, age, disability, sex, sexual orientation, or gender identity.            Thank you!     Thank you for choosing [place] [facility]THLETIC [facility]ICINE [facility] PHYSICAL THERAPY  for your care. Our goal is always to provide you with excellent care. Hearing back from our patients is one way we can continue to improve our services. Please take a few minutes to complete the written survey that you may receive in the mail after your visit with us. Thank you!             Your Updated Medication List - Protect others around you: Learn how to safely use, store and throw away your medicines at www.disposemymeds.org.          This list is accurate as of 5/9/18  1:27 PM.  Always use your most recent med list.                   Brand Name Dispense Instructions for use Diagnosis    creatine 400 MG capsule     90 capsule    Take 3 po BID        * fluticasone 50 MCG/ACT spray    FLONASE     Spray 2 sprays into both nostrils daily        * fluticasone 50 MCG/ACT spray    FLONASE    1 Bottle    Spray 2 sprays into both nostrils daily    Acute seasonal allergic rhinitis due to other allergen       * LORazepam 1 MG tablet    ATIVAN    10 tablet    Take 1 tablet (1 mg) by mouth every 8 hours as needed for anxiety        * LORazepam 1 MG tablet    ATIVAN    20 tablet     Take 1 tablet (1 mg) by mouth every 8 hours as needed for anxiety    Anxiety       study - emtricitabine-tenofovir 200-300 (IDS#4299) per tablet    TRUVADA    30 tablet    Take 1 tablet by mouth daily        * Notice:  This list has 4 medication(s) that are the same as other medications prescribed for you. Read the directions carefully, and ask your doctor or other care provider to review them with you.

## 2018-05-11 NOTE — PROGRESS NOTES
Renata, Please contact patient by letter/phone with following results:  My chart sent. Sinuses look clear, no signs of infection.

## 2018-08-29 ENCOUNTER — OFFICE VISIT (OUTPATIENT)
Dept: OTOLARYNGOLOGY | Facility: CLINIC | Age: 33
End: 2018-08-29
Payer: COMMERCIAL

## 2018-08-29 ENCOUNTER — RADIANT APPOINTMENT (OUTPATIENT)
Dept: CT IMAGING | Facility: CLINIC | Age: 33
End: 2018-08-29
Attending: OTOLARYNGOLOGY
Payer: COMMERCIAL

## 2018-08-29 VITALS — HEIGHT: 70 IN | BODY MASS INDEX: 25.91 KG/M2 | WEIGHT: 181 LBS

## 2018-08-29 DIAGNOSIS — J01.01 ACUTE RECURRENT MAXILLARY SINUSITIS: ICD-10-CM

## 2018-08-29 DIAGNOSIS — J01.01 ACUTE RECURRENT MAXILLARY SINUSITIS: Primary | ICD-10-CM

## 2018-08-29 ASSESSMENT — PAIN SCALES - GENERAL: PAINLEVEL: MILD PAIN (2)

## 2018-08-29 NOTE — MR AVS SNAPSHOT
After Visit Summary   2018    Juarez Jauregui    MRN: [id number]           Patient Information     Date Of Birth          1985        Visit Information        Provider Department      2018 3:00 PM Tiffanie Ybarra MD [facility] Ear Nose and Throat        Today's Diagnoses     Acute recurrent maxillary sinusitis    -  1      Care Instructions    Plan of care:  CT today, we will contact you with results  Clinic contact information:  1. To schedule an appointment or to speak to someone regarding your medical care, please call [phone], option #1  2. RenataRN: [phone]  3. Surgery scheduling:      Karley Singh: [phone]      Danielle Allie: [phone]  4. Fax: [phone]  5. [phone]            Follow-ups after your visit        Who to contact     Please call your clinic at [phone] to:    Ask questions about your health    Make or cancel appointments    Discuss your medicines    Learn about your test results    Speak to your doctor            Additional Information About Your Visit        [employer] Information     [employer] gives you secure access to your electronic health record. If you see a primary care provider, you can also send messages to your care team and make appointments. If you have questions, please call your primary care clinic.  If you do not have a primary care provider, please call [phone] and they will assist you.      [employer] is an electronic gateway that provides easy, online access to your medical records. With [employer], you can request a clinic appointment, read your test results, renew a prescription or communicate with your care team.     To access your existing account, please contact your [facility] or call [phone] for assistance.        Care EveryWhere ID     This is your Care EveryWhere ID. This could be used by other organizations to access your [place] medical records  [id number]        Your  "Vitals Were     Height BMI (Body Mass Index)                1.78 m (5' 10.08\") 25.91 kg/m2           Blood Pressure from Last 3 Encounters:   03/20/18 122/85   05/24/15 (!) 116/92    Weight from Last 3 Encounters:   08/29/18 82.1 kg (181 lb)   05/07/18 82.6 kg (182 lb)   03/20/18 81.2 kg (179 lb)               Primary Care Provider Office Phone # Fax #    Danielle Allyson Mitchell [phone]       [address] [place] [address]        Equal Access to Services     [facility]: Hadii lynne saldaña hadjennifer Wang, waaxda esequiel, qaybta kaalmada clarisa, edd goss . So [facility] [phone].    ATENCIÓN: Si habla español, tiene a serrano disposición servicios gratuitos de asistencia lingüística. [facility] [phone].    We comply with applicable federal civil rights laws and [place] laws. We do not discriminate on the basis of race, color, national origin, age, disability, sex, sexual orientation, or gender identity.            Thank you!     Thank you for choosing [facility] EAR NOSE AND THROAT  for your care. Our goal is always to provide you with excellent care. Hearing back from our patients is one way we can continue to improve our services. Please take a few minutes to complete the written survey that you may receive in the mail after your visit with us. Thank you!             Your Updated Medication List - Protect others around you: Learn how to safely use, store and throw away your medicines at www.disposemymeds.org.          This list is accurate as of 8/29/18 11:59 PM.  Always use your most recent med list.                   Brand Name Dispense Instructions for use Diagnosis    creatine 400 MG capsule     90 capsule    Take 3 po BID        * fluticasone 50 MCG/ACT spray    FLONASE     Spray 2 sprays into both nostrils daily        * fluticasone 50 MCG/ACT spray    FLONASE    1 Bottle    Spray 2 sprays into both nostrils daily    Acute seasonal allergic rhinitis " due to other allergen       * LORazepam 1 MG tablet    ATIVAN    10 tablet    Take 1 tablet (1 mg) by mouth every 8 hours as needed for anxiety        * LORazepam 1 MG tablet    ATIVAN    20 tablet    Take 1 tablet (1 mg) by mouth every 8 hours as needed for anxiety    Anxiety       study - emtricitabine-tenofovir 200-300 (IDS#4299) per tablet    TRUVADA    30 tablet    Take 1 tablet by mouth daily        * Notice:  This list has 4 medication(s) that are the same as other medications prescribed for you. Read the directions carefully, and ask your doctor or other care provider to review them with you.

## 2018-08-29 NOTE — PATIENT INSTRUCTIONS
Plan of care:  CT today, we will contact you with results  Clinic contact information:  1. To schedule an appointment or to speak to someone regarding your medical care, please call [phone], option #1  2. JEOVANY Yanez: [phone]  3. Surgery scheduling:      Karley Singh: [phone]      Danielle Kelley: [phone]  4. Fax: [phone]  5. [phone]

## 2018-08-29 NOTE — NURSING NOTE
"Chief Complaint   Patient presents with     RECHECK     sinusitis      Height 1.78 m (5' 10.08\"), weight 82.1 kg (181 lb).    Handy Calle LPN    "

## 2018-08-29 NOTE — LETTER
8/29/2018       RE: Juarez Jauregui  [address]  [place] [address]     Dear Colleague,    Thank you for referring your patient, Juarez Jauregui, to the [facility] EAR NOSE AND THROAT at [facility]. Please see a copy of my visit note below.    Juarez had seen me previously for recurrent acute episodes of upper respiratory symptoms.  A CT at that time did not reveal significant sinus obstruction or disease.  I felt it would be helpful to see him during an acute episode.  He has been symptomatic for the last several days with congestion, drainage, sore throat and neck and fever.  He does have seasonal allergies, but has done well this year.      Exam:  Nasal passages with mild irritation of mucosa.  No obvious post nasal drainage in oropharynx and no exudate.  Some tonsillar cryptic debri.      A/P:  Given acute symptoms a CT would be helpful to sort out etiology of these episodes.  If sinuses clear this could be allergy related or viral.  I explained this to Juarez and he seems comfortable with the plan. A mychart note will be sent with CT results.     Again, thank you for allowing me to participate in the care of your patient.      Sincerely,    Tiffanie Ybarra MD

## 2018-08-30 NOTE — PROGRESS NOTES
Renata, Please contact patient by letter/phone with following results:  My chart sent:  Juarez - The CT looks the same as the one from May - let me know if you want to further discuss - either by phone or through [employer] messages.

## 2018-09-01 NOTE — PROGRESS NOTES
Juarez had seen me previously for recurrent acute episodes of upper respiratory symptoms.  A CT at that time did not reveal significant sinus obstruction or disease.  I felt it would be helpful to see him during an acute episode.  He has been symptomatic for the last several days with congestion, drainage, sore throat and neck and fever.  He does have seasonal allergies, but has done well this year.      Exam:  Nasal passages with mild irritation of mucosa.  No obvious post nasal drainage in oropharynx and no exudate.  Some tonsillar cryptic debri.      A/P:  Given acute symptoms a CT would be helpful to sort out etiology of these episodes.  If sinuses clear this could be allergy related or viral.  I explained this to Juarez and he seems comfortable with the plan. A mychart note will be sent with CT results.

## 2018-09-07 ENCOUNTER — MYC MEDICAL ADVICE (OUTPATIENT)
Dept: OTOLARYNGOLOGY | Facility: CLINIC | Age: 33
End: 2018-09-07

## 2018-09-07 DIAGNOSIS — J31.0 CHRONIC RHINITIS, UNSPECIFIED TYPE: Primary | ICD-10-CM

## 2018-09-17 ENCOUNTER — MYC MEDICAL ADVICE (OUTPATIENT)
Dept: OTOLARYNGOLOGY | Facility: CLINIC | Age: 33
End: 2018-09-17

## 2018-09-17 DIAGNOSIS — J31.0 CHRONIC RHINITIS: Primary | ICD-10-CM

## 2018-10-15 ENCOUNTER — ALLIED HEALTH/NURSE VISIT (OUTPATIENT)
Dept: FAMILY MEDICINE | Facility: CLINIC | Age: 33
End: 2018-10-15
Payer: COMMERCIAL

## 2018-10-15 DIAGNOSIS — Z23 NEED FOR HEPATITIS A VACCINATION: Primary | ICD-10-CM

## 2018-10-15 NOTE — NURSING NOTE
Screening Questionnaire for Adult Immunization    Are you sick today?   No   Do you have allergies to medications, food, a vaccine component or latex?   No   Have you ever had a serious reaction after receiving a vaccination?   No   Do you have a long-term health problem with heart disease, lung disease, asthma, kidney disease, metabolic disease (e.g. diabetes), anemia, or other blood disorder?   No   Do you have cancer, leukemia, HIV/AIDS, or any other immune system problem?   No   In the past 3 months, have you taken medications that affect  your immune system, such as prednisone, other steroids, or anticancer drugs; drugs for the treatment of rheumatoid arthritis, Crohn s disease, or psoriasis; or have you had radiation treatments?   No   Have you had a seizure, or a brain or other nervous system problem?   No   During the past year, have you received a transfusion of blood or blood     products, or been given immune (gamma) globulin or antiviral drug?   No   For women: Are you pregnant or is there a chance you could become        pregnant during the next month?   No   Have you received any vaccinations in the past 4 weeks?   No     Immunization questionnaire answers were all negative.        Per orders of Dr. Mitchell, injection of #2 Hep. A vaccine given by Diamante Andrade. Patient instructed to remain in clinic for 15 minutes afterwards, and to report any adverse reaction to me immediately.       Screening performed by Diamante Andrade on 10/15/2018 at 3:15 PM.    Juarez Juventino comes into clinic today at the request of Dr. Mitchell Ordering Provider for #2 Hep. A vaccine.        This service provided today was under the supervising provider of the day Dr. Hastings, who was available if needed.    Diamante Andrade

## 2018-10-15 NOTE — MR AVS SNAPSHOT
After Visit Summary   10/15/2018    Juarez Jauregui    MRN: [id number]           Patient Information     Date Of Birth          1985        Visit Information        Provider Department      10/15/2018 3:00 PM Nurse, [place] [facility]        Today's Diagnoses     Need for hepatitis A vaccination    -  1       Follow-ups after your visit        Your next 10 appointments already scheduled     Dec 17, 2018  2:15 PM CST   (Arrive by 2:00 PM)   New Allergy with Vitaly Messina MD   [facility] ([facility] [place])    [address] [place] [address]  [address] [place] [address] [phone]           Do not take anti-histamines or Zantac for seven day prior to your appointment.              Who to contact     Please call your clinic at [phone] to:    Ask questions about your health    Make or cancel appointments    Discuss your medicines    Learn about your test results    Speak to your doctor            Additional Information About Your Visit        [employer] Information     [employer] gives you secure access to your electronic health record. If you see a primary care provider, you can also send messages to your care team and make appointments. If you have questions, please call your primary care clinic.  If you do not have a primary care provider, please call [phone] and they will assist you.      [employer] is an electronic gateway that provides easy, online access to your medical records. With [employer], you can request a clinic appointment, read your test results, renew a prescription or communicate with your care team.     To access your existing account, please contact your [facility] or call [phone] for assistance.        Care EveryWhere ID     This is your Care EveryWhere ID. This could be used by other organizations to access your [place] medical records  [id number]         Blood Pressure from  Last 3 Encounters:   03/20/18 122/85   05/24/15 (!) 116/92    Weight from Last 3 Encounters:   08/29/18 181 lb (82.1 kg)   05/07/18 182 lb (82.6 kg)   03/20/18 179 lb (81.2 kg)              We Performed the Following     HEPA VACCINE ADULT IM     VACCINE ADMINISTRATION, INITIAL        Primary Care Provider Office Phone # Fax #    Danielle Mitchell [phone]       [address] [place] [address]        Equal Access to Services     [facility]: Hadii aad ku hadasho Soomaali, waaxda luqadaha, qaybta kaalmada clarisa, edd goss . So [facility] [phone].    ATENCIÓN: Si habla español, tiene a serrano disposición servicios gratuitos de asistencia lingüística. [facility] [phone].    We comply with applicable federal civil rights laws and [place] laws. We do not discriminate on the basis of race, color, national origin, age, disability, sex, sexual orientation, or gender identity.            Thank you!     Thank you for choosing [facility]  for your care. Our goal is always to provide you with excellent care. Hearing back from our patients is one way we can continue to improve our services. Please take a few minutes to complete the written survey that you may receive in the mail after your visit with us. Thank you!             Your Updated Medication List - Protect others around you: Learn how to safely use, store and throw away your medicines at www.disposemymeds.org.          This list is accurate as of 10/15/18  3:16 PM.  Always use your most recent med list.                   Brand Name Dispense Instructions for use Diagnosis    creatine 400 MG capsule     90 capsule    Take 3 po BID        * fluticasone 50 MCG/ACT spray    FLONASE     Spray 2 sprays into both nostrils daily        * fluticasone 50 MCG/ACT spray    FLONASE    1 Bottle    Spray 2 sprays into both nostrils daily    Acute seasonal allergic rhinitis due to other allergen       *  LORazepam 1 MG tablet    ATIVAN    10 tablet    Take 1 tablet (1 mg) by mouth every 8 hours as needed for anxiety        * LORazepam 1 MG tablet    ATIVAN    20 tablet    Take 1 tablet (1 mg) by mouth every 8 hours as needed for anxiety    Anxiety       study - emtricitabine-tenofovir 200-300 (IDS#4299) per tablet    TRUVADA    30 tablet    Take 1 tablet by mouth daily        * Notice:  This list has 4 medication(s) that are the same as other medications prescribed for you. Read the directions carefully, and ask your doctor or other care provider to review them with you.

## 2018-11-27 NOTE — TELEPHONE ENCOUNTER
FUTURE VISIT INFORMATION      FUTURE VISIT INFORMATION:    Date: 12.17.18    Time: 2:15 PM    Location: [facility]  REFERRAL INFORMATION:    Referring provider:  Dr. Ybarra    Referring providers clinic:  ENT Clinic    Reason for visit/diagnosis  Chronic Rhinits    RECORDS REQUESTED FROM:       Clinic name Comments Records Status Imaging Status   ENT Clinic Referral placed 9.17.18, OV 8.29.18 epic

## 2018-12-03 ASSESSMENT — ENCOUNTER SYMPTOMS
CHILLS: 0
BLOATING: 1
HOARSE VOICE: 0
SINUS PAIN: 1
SORE THROAT: 0
DYSURIA: 1
CONSTIPATION: 1
RECTAL PAIN: 1
SINUS CONGESTION: 1
TROUBLE SWALLOWING: 0
HEMATURIA: 0
NAIL CHANGES: 0
BLOOD IN STOOL: 0
DECREASED APPETITE: 0
VOMITING: 0
NECK MASS: 0
WEIGHT LOSS: 0
DIFFICULTY URINATING: 0
JAUNDICE: 0
BOWEL INCONTINENCE: 0
ABDOMINAL PAIN: 1
DIARRHEA: 0
HEARTBURN: 0
POLYPHAGIA: 0
SMELL DISTURBANCE: 0
NIGHT SWEATS: 1
SKIN CHANGES: 0
ALTERED TEMPERATURE REGULATION: 0
POOR WOUND HEALING: 0
TASTE DISTURBANCE: 0
NAUSEA: 1
POLYDIPSIA: 0
WEIGHT GAIN: 0
FLANK PAIN: 0
HALLUCINATIONS: 0
INCREASED ENERGY: 0
FATIGUE: 0
FEVER: 0

## 2018-12-17 ENCOUNTER — OFFICE VISIT (OUTPATIENT)
Dept: PULMONOLOGY | Facility: CLINIC | Age: 33
End: 2018-12-17
Attending: ALLERGY & IMMUNOLOGY
Payer: COMMERCIAL

## 2018-12-17 ENCOUNTER — PRE VISIT (OUTPATIENT)
Dept: PULMONOLOGY | Facility: CLINIC | Age: 33
End: 2018-12-17

## 2018-12-17 VITALS
RESPIRATION RATE: 16 BRPM | HEART RATE: 81 BPM | BODY MASS INDEX: 26.14 KG/M2 | SYSTOLIC BLOOD PRESSURE: 129 MMHG | HEIGHT: 70 IN | WEIGHT: 182.6 LBS | DIASTOLIC BLOOD PRESSURE: 82 MMHG | OXYGEN SATURATION: 96 %

## 2018-12-17 DIAGNOSIS — J30.89 ALLERGIC RHINITIS CAUSED BY MOLD: Primary | ICD-10-CM

## 2018-12-17 DIAGNOSIS — J31.0 CHRONIC RHINITIS: ICD-10-CM

## 2018-12-17 DIAGNOSIS — J30.1 SEASONAL ALLERGIC RHINITIS DUE TO POLLEN: ICD-10-CM

## 2018-12-17 PROCEDURE — G0463 HOSPITAL OUTPT CLINIC VISIT: HCPCS | Mod: 25,ZF

## 2018-12-17 PROCEDURE — 95004 PERQ TESTS W/ALRGNC XTRCS: CPT | Performed by: ALLERGY & IMMUNOLOGY

## 2018-12-17 PROCEDURE — 95024 IQ TESTS W/ALLERGENIC XTRCS: CPT | Performed by: ALLERGY & IMMUNOLOGY

## 2018-12-17 ASSESSMENT — PAIN SCALES - GENERAL: PAINLEVEL: NO PAIN (0)

## 2018-12-17 ASSESSMENT — MIFFLIN-ST. JEOR: SCORE: 1780.77

## 2018-12-17 NOTE — PATIENT INSTRUCTIONS
Start fluticasone 2 sprays in each nostril daily.  April 1 until first hard frost add 10 mg ceterizine.  When going around cats in the winter take 10 mg ceterizine at least 30 minutes before exposure.   Allergy shots are an option.        IMMUNOTHERAPY (ALLERGY SHOTS)  General Information    What are allergy shots and what can they do for me?  --Allergy shots (otherwise known as immunotherapy or desensitization) help make your immune system less sensitive to the things that cause your allergy symptoms.    Should I have allergy shots?  --MAYBE--If you are allergic to things that are unavoidable such as trees, grasses, weeds, molds, dust or animals.  --MAYBE--If you have to take medicine(s) more often than not to control your allergies; OR despite your medicines, you are still having allergy symptoms.  --MAYBE--If your allergies are interfering with daily activities.    What can allergy shots do for me?  --Eventually, you may no longer need your allergy medicine. You should have fewer or milder allergy symptoms. You may no longer have allergy symptoms. You may not have to visit your health care provider as often.     Can allergy shots really help?  --The success rate for allergy shots is high ~75-85%. Many people consider themselves  cured , but some will suffer a recurrence. Some people will require shots for longer than 5 years, but most people who complete the allergy shot program do not need to take shots again. Typically immunotherapy is complete around 3 years, but it depends on how well the doseages are tolerated, but the doctor will decide.    How do allergy shots work?  --Allergy shots result in your body making  blocking IgG  antibodies to your  allergy IgE  antibodies. This makes you react less to the things that cause your allergy symptoms. If your allergies are  blocked , you do not release histamines or other allergy mediators that cause your allergy symptoms.     How long before allergy shots start to  work?  --Allergy shots may start to work in four to six months. For some people it may take a year. If there is no improvement after two years, discuss this with your doctor or nurse practitioner. The typical maximum benefit from allergy shots usually occurs within the first 1-2 years after reaching and adequate maintenance dose.     Can you still take your other medications?  --The goal of the allergy shots is to allow you to feel as good as possible, with as little medication as possible, and with as few shots as possible. You may still need to take your medications until the allergy shots begin to work.     How much do allergy shots cost?  --Allergy shots may be somewhat costly the first year when more frequent allergy injections are needed. The vial of allergy extract is a separate cost from the actual injection. Your insurance is billed when the allergy vial of extract is made and the injection cost is billed when the shot is administered. Coverage of the cost by insurance plans and HMO varies. In succeeding years, when the injections are spread out to 2-8 weeks, the cost dramatically decreases. Allergy shots may save you money, as you will not require as much medicine, and as you will decrease the number of visits to your health care provider.    Are allergy shots painful?  --The needle used to give shots is very small and thin. The shot is given in the fatty part of the arm, not into the muscle. Most people say that when the allergy shot is given that it does not hurt.     Are allergy shots risky?  --Allergic reactions are rare. Reactions can occur because you are receiving small amounts of what you are allergic to. The most serious reactions occur within 30 minutes of the injection.       LOCAL REACTIONS:  Some swelling and/or redness may occur in the first twenty minutes. If there is no discomfort, this may be ignored. If there is a local reaction (the size of a twenty-five cent piece), or a hive which  lasts longer than 24 hours, please notifiy us before receiving the next injection. Occasionally, you may develop a delayed local reaction between 4-48 hours. These reactions require no treatment, but may be counteracted by using a cool compress and by taking a dose of an antihistamine, plus Tylenol (Acetaminophen) for pain.  SYSTEMIC REACTIONS: This type of reaction is more serious. It can consist of symptoms of throat tightening, difficulty breathing, fainting, vomiting, or hives, amongst others. If you are still in the office/clinic, immediately notify a health care provider. If you have left the office/clinic, seek immediate health care assistance. Systemic reactions require immediate treatment and notification of our office. Treatment of systemic reactions requires the administration of adrenaline (epinephrine) and evaluation by a physician. Please call our office at [phone] to speak to a nurse during business hours. If after hours please go to the ER.      **EVERYONE MUST WAIT 30-MINUTES AFTER EACH ALLERGY SHOT IN CASE OF A REACTION!!**                        *All information has been reviewed, updated and approved by:  Dr. [facility]y [facility] updated: 1/2017  Mold Allergy    Remove live indoor houseplants (molds are in the soil).    Keep windows and doors shut and run the air conditioner at all times; this keeps the molds outside.    Keep windows closed while in the car and air conditioner on with it set to  recirculate  mode.    Stay indoors as much as possible when the mold count is high. Check allergy counts by going to our website: www.pollen.com    If you have a basement, use a dehumidifier.    Use bleach if you see evidence of molds in bathrooms or janet.   MOLD WHERE MOLDS ARE MOST COMMONLY FOUND   Alternaria Outdoors On decaying plants and live plant material, also in soil   Helminthosporium Outdoors On grain plants and grasses, josefina counts in rural areas  "when threshing grain   Hormodendrum/Cladosporium Indoors & Outdoors On leather, rubber, cloth, foods, and wood products; in soil, living and dead plants; also released after a rain   Fusarium Outdoors On garden plants (peas, beans, tomatoes, corn etc.) and stored fruits and vegetables   Aspergillus niger Indoors & Outdoors On damp hay, cloth, leather, paper, spoiled foods, decaying plant and vegetable material, in bathrooms and in refrigerator drip pans   Epicoccum Indoors & Outdoors In soil and on living or dead plants (such as small black dots), and uncooked fruit     *All information has been reviewed, updated and approved by:  Dr. Vitaly [facility] updated: 11/2016         Pollen Control Measures      * Keep windows and doors shut and run air conditioner at all times. This keeps outdoor air outside.    * Keep windows closed while in the car and air conditioner on the \"re-circulate\" mode.    * Wash your hair before going to bed at night to reduce exposure during sleep.    * Keep pets outdoors to prevent the pollen from being brought in on their hair.    * Avoid hanging clothes and linens outside as pollens may collect on the items.     * Don't mow the lawn if you are allergic to grass or weeds. If you must mow the grass, wear a face mask.       Spring: Tree Pollen    Summer: Grass Pollen and Mold    Fall: Weed Pollen and Mold      *All information has been reviewed, updated and approved by:  Dr. Vitaly [facility] updated: 11/2016  "

## 2018-12-17 NOTE — LETTER
12/17/2018       RE: Juarez Jauregui  [address]  [place] [address]     Dear Colleague,    Thank you for referring your patient, Juarez Jauregui, to the [facility] at [facility]. Please see a copy of my visit note below.    Reason for Visit  Juarez Jauregui is a 33 year old male who is referred by Danielle Mitchell for chronic sinus infections    Allergy HPI    Sinusitis symptoms since 2010. Recurrent sinus infections. Pain in sinuses, difficulty breathing, retro-orbital pain. Has seen ENT for this and was found to have a deviated septum; otherwise, normal exam. CT of sinuses unremarkable. Sinus infections are not correlated with any particular time of the year.     Has seasonal allergies from Spring-Fall, worse in the Spring. Has rhinorrhea, itchy eyes, sneezing. These symptoms ae different than the ones described above. Has history eczema. Uses Flonase from spring-fall. Has tried Allegra and Zyrtec in the past but hasn't noticed a big difference.       The patient was seen and examined by Ciara Dover MD   Current Outpatient Medications   Medication     Cholecalciferol (VITAMIN D PO)     creatine 400 MG capsule     Fish Oil-Cholecalciferol (FISH OIL + D3 PO)     fluticasone (FLONASE) 50 MCG/ACT nasal spray     fluticasone (FLONASE) 50 MCG/ACT spray     LORazepam (ATIVAN) 1 MG tablet     LORazepam (ATIVAN) 1 MG tablet     study - emtricitabine-tenofovir 200-300, IDS#[id number], (TRUVADA) per tablet     No current facility-administered medications for this visit.      Allergies   Allergen Reactions     Neosporin Af [Miconazole]      Adhesive Tape Rash     Topical creams: neosporin and bacitracin     Bacitracin Rash     Bacitracin-Polymyxin B Rash and GI Disturbance     Triple Antibiotic Rash     Social:  Lives in a house, partially carpeted. Quit cigerates in 2010. No pets in the house. Works as a .     Past Medical History:  "  Diagnosis Date     Prostatitis      Past Surgical History:   Procedure Laterality Date     COLONOSCOPY  10/2007     DENTAL SURGERY       Family History   Problem Relation Age of Onset     Pre-Diabetes Mother      Parkinsonism Maternal Grandmother      Heart Failure Maternal Grandmother      Arrhythmia Maternal Grandmother         on blood thinners     Cancer Maternal Grandmother         TOB, ? esophageal     Dementia Maternal Grandfather      Prostate Cancer Maternal Grandfather    Father with history of asthma.       ROS   A complete ROS was otherwise negative except as noted in the HPI and the end of the note.  /82 (BP Location: Right arm, Patient Position: Chair, Cuff Size: Adult Regular)   Pulse 81   Resp 16   Ht 1.78 m (5' 10.08\")   Wt 82.8 kg (182 lb 9.6 oz)   SpO2 96%   BMI 26.14 kg/m       Exam:   GENERAL APPEARANCE: Well developed, well nourished, alert, and in no apparent distress.  EYES: PERRL, EOMI, conjunctiva clear non-injected  HENT: Nasal mucosa with mild edema and deviation of the septum to the left. No discharge. No nasal polyps.  No facial tenderness.  EARS: Canals clear, TMs normal  MOUTH: Oral mucosa is moist, without any lesions, no tonsillar enlargement, no oropharyngeal exudate.  NECK: Supple, no masses, no thyromegaly.  LYMPHATICS: No significant cervical, or supraclavicular nodes.  RESP: Good air flow throughout.  No crackles. No rhonchi. No wheezes.  CV: Normal S1, S2, regular rhythm, normal rate. No murmur.  No rub. No gallop. No LE edema.   MS: Extremities normal. No clubbing. No cyanosis.  SKIN: No rashes noted  NEURO: Speech normal, normal strength and tone, normal gait and stance  PSYCH: Normal mentation, orientation to person, place, and time.    Results: 43 percutaneous and 2 intradermal (dust mite0 environmental allergen (and 2 controls) extracts placed by MA and read by MD.    Positive for Cat Dander, Tree Pollen, Outdoor Mold, Grass Pollen, Tall Ragweed. "     Assessment and plan:  Juarez Jauregui is a 33 year old male who is referred by Danielle Mitchell for chronic sinus infections.     Skin allergy testing positive for agents listed above. Discussed possible benefits of reducing sinus infections with allergy shots (Pt will consider this). Meanwhile, discussed using 2 sprays/nostril of Flonase daily year around in addition to 10mg cetirizine daily starting in April through first hard frost and at least 20min before cat exposure year around.     Pt seen and discussed with Dr. Siddharth Dover MD  Internal Medicine/Pediatrics, PGY4    Physician Attestation   I, Vitaly Julien, saw this patient with the resident and agree with the resident/fellow's findings and plan of care as documented in the note.          Vitaly Julien MD  Date of Service (when I saw the patient): 12/17/18                      Again, thank you for allowing me to participate in the care of your patient.      Sincerely,    Vitaly Julien MD

## 2018-12-17 NOTE — PROGRESS NOTES
Reason for Visit  Juarez Jauregui is a 33 year old male who is referred by Danielle Mitchell for chronic sinus infections    Allergy HPI    Sinusitis symptoms since 2010. Recurrent sinus infections. Pain in sinuses, difficulty breathing, retro-orbital pain. Has seen ENT for this and was found to have a deviated septum; otherwise, normal exam. CT of sinuses unremarkable. Sinus infections are not correlated with any particular time of the year.     Has seasonal allergies from Spring-Fall, worse in the Spring. Has rhinorrhea, itchy eyes, sneezing. These symptoms ae different than the ones described above. Has history eczema. Uses Flonase from spring-fall. Has tried Allegra and Zyrtec in the past but hasn't noticed a big difference.       The patient was seen and examined by Ciara Dover MD   Current Outpatient Medications   Medication     Cholecalciferol (VITAMIN D PO)     creatine 400 MG capsule     Fish Oil-Cholecalciferol (FISH OIL + D3 PO)     fluticasone (FLONASE) 50 MCG/ACT nasal spray     fluticasone (FLONASE) 50 MCG/ACT spray     LORazepam (ATIVAN) 1 MG tablet     LORazepam (ATIVAN) 1 MG tablet     study - emtricitabine-tenofovir 200-300, IDS#[id number], (TRUVADA) per tablet     No current facility-administered medications for this visit.      Allergies   Allergen Reactions     Neosporin Af [Miconazole]      Adhesive Tape Rash     Topical creams: neosporin and bacitracin     Bacitracin Rash     Bacitracin-Polymyxin B Rash and GI Disturbance     Triple Antibiotic Rash     Social:  Lives in a house, partially carpeted. Quit cigerates in 2010. No pets in the house. Works as a .     Past Medical History:   Diagnosis Date     Prostatitis      Past Surgical History:   Procedure Laterality Date     COLONOSCOPY  10/2007     DENTAL SURGERY       Family History   Problem Relation Age of Onset     Pre-Diabetes Mother      Parkinsonism Maternal Grandmother      Heart Failure Maternal Grandmother       "Arrhythmia Maternal Grandmother         on blood thinners     Cancer Maternal Grandmother         TOB, ? esophageal     Dementia Maternal Grandfather      Prostate Cancer Maternal Grandfather    Father with history of asthma.       ROS   A complete ROS was otherwise negative except as noted in the HPI and the end of the note.  /82 (BP Location: Right arm, Patient Position: Chair, Cuff Size: Adult Regular)   Pulse 81   Resp 16   Ht 1.78 m (5' 10.08\")   Wt 82.8 kg (182 lb 9.6 oz)   SpO2 96%   BMI 26.14 kg/m      Exam:   GENERAL APPEARANCE: Well developed, well nourished, alert, and in no apparent distress.  EYES: PERRL, EOMI, conjunctiva clear non-injected  HENT: Nasal mucosa with mild edema and deviation of the septum to the left. No discharge. No nasal polyps.  No facial tenderness.  EARS: Canals clear, TMs normal  MOUTH: Oral mucosa is moist, without any lesions, no tonsillar enlargement, no oropharyngeal exudate.  NECK: Supple, no masses, no thyromegaly.  LYMPHATICS: No significant cervical, or supraclavicular nodes.  RESP: Good air flow throughout.  No crackles. No rhonchi. No wheezes.  CV: Normal S1, S2, regular rhythm, normal rate. No murmur.  No rub. No gallop. No LE edema.   MS: Extremities normal. No clubbing. No cyanosis.  SKIN: No rashes noted  NEURO: Speech normal, normal strength and tone, normal gait and stance  PSYCH: Normal mentation, orientation to person, place, and time.    Results: 43 percutaneous and 2 intradermal (dust mite0 environmental allergen (and 2 controls) extracts placed by MA and read by MD.    Positive for Cat Dander, Tree Pollen, Outdoor Mold, Grass Pollen, Tall Ragweed.     Assessment and plan:  Juarez Jauregui is a 33 year old male who is referred by Danielle Mitchell for chronic sinus infections.     Skin allergy testing positive for agents listed above. Discussed possible benefits of reducing sinus infections with allergy shots (Pt will consider this). Meanwhile, " discussed using 2 sprays/nostril of Flonase daily year around in addition to 10mg cetirizine daily starting in April through first hard frost and at least 20min before cat exposure year around.     Pt seen and discussed with Dr. Siddharth Dover MD  Internal Medicine/Pediatrics, PGY4    Physician Attestation   I, Vitaly Julien, saw this patient with the resident and agree with the resident/fellow's findings and plan of care as documented in the note.          Vitaly Julien MD  Date of Service (when I saw the patient): 12/17/18

## 2019-01-10 ENCOUNTER — OFFICE VISIT (OUTPATIENT)
Dept: FAMILY MEDICINE | Facility: CLINIC | Age: 34
End: 2019-01-10
Payer: COMMERCIAL

## 2019-01-10 ENCOUNTER — TRANSFERRED RECORDS (OUTPATIENT)
Dept: HEALTH INFORMATION MANAGEMENT | Facility: CLINIC | Age: 34
End: 2019-01-10

## 2019-01-10 VITALS
RESPIRATION RATE: 16 BRPM | WEIGHT: 183.25 LBS | TEMPERATURE: 98.1 F | SYSTOLIC BLOOD PRESSURE: 126 MMHG | HEART RATE: 71 BPM | BODY MASS INDEX: 26.23 KG/M2 | OXYGEN SATURATION: 97 % | DIASTOLIC BLOOD PRESSURE: 87 MMHG

## 2019-01-10 DIAGNOSIS — M54.9 UPPER BACK PAIN: ICD-10-CM

## 2019-01-10 DIAGNOSIS — M54.2 CERVICALGIA: Primary | ICD-10-CM

## 2019-01-10 RX ORDER — PREDNISONE 20 MG/1
20 TABLET ORAL DAILY
Qty: 7 TABLET | Refills: 0 | Status: SHIPPED | OUTPATIENT
Start: 2019-01-10 | End: 2019-02-25

## 2019-01-10 RX ORDER — CYCLOBENZAPRINE HCL 10 MG
TABLET ORAL
Qty: 14 TABLET | Refills: 0 | Status: SHIPPED | OUTPATIENT
Start: 2019-01-10 | End: 2019-01-25

## 2019-01-10 ASSESSMENT — PAIN SCALES - GENERAL: PAINLEVEL: MODERATE PAIN (4)
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
## 2019-01-10 NOTE — PATIENT INSTRUCTIONS
No aleve or ibuprofen while taking prednisone    Cyclobenzaprine, muscle relaxant    Prednisone is like a big ibuprofen so only take tylenol if you need something for pain  2 tablets at the same time daily with food    Warm moist heat, rest

## 2019-01-14 PROBLEM — M54.9 UPPER BACK PAIN: Status: ACTIVE | Noted: 2019-01-14

## 2019-01-14 PROBLEM — M54.2 CERVICALGIA: Status: ACTIVE | Noted: 2019-01-14

## 2019-01-22 ENCOUNTER — MYC MEDICAL ADVICE (OUTPATIENT)
Dept: PULMONOLOGY | Facility: CLINIC | Age: 34
End: 2019-01-22

## 2019-01-25 ENCOUNTER — OFFICE VISIT (OUTPATIENT)
Dept: FAMILY MEDICINE | Facility: CLINIC | Age: 34
End: 2019-01-25
Payer: COMMERCIAL

## 2019-01-25 VITALS
OXYGEN SATURATION: 93 % | BODY MASS INDEX: 26.05 KG/M2 | DIASTOLIC BLOOD PRESSURE: 87 MMHG | TEMPERATURE: 99 F | HEART RATE: 95 BPM | HEIGHT: 70 IN | WEIGHT: 182 LBS | SYSTOLIC BLOOD PRESSURE: 123 MMHG

## 2019-01-25 DIAGNOSIS — M54.2 CERVICALGIA: Primary | ICD-10-CM

## 2019-01-25 DIAGNOSIS — M54.9 UPPER BACK PAIN: ICD-10-CM

## 2019-01-25 DIAGNOSIS — F41.9 ANXIETY: ICD-10-CM

## 2019-01-25 RX ORDER — LORAZEPAM 1 MG/1
1 TABLET ORAL EVERY 8 HOURS PRN
Qty: 20 TABLET | Refills: 0 | Status: SHIPPED | OUTPATIENT
Start: 2019-01-25 | End: 2019-06-26

## 2019-01-25 RX ORDER — CYCLOBENZAPRINE HCL 10 MG
TABLET ORAL
Qty: 30 TABLET | Refills: 0 | Status: SHIPPED | OUTPATIENT
Start: 2019-01-25 | End: 2019-02-27

## 2019-01-25 ASSESSMENT — MIFFLIN-ST. JEOR: SCORE: 1778.05

## 2019-01-25 ASSESSMENT — PAIN SCALES - GENERAL: PAINLEVEL: SEVERE PAIN (6)
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
## 2019-01-25 NOTE — NURSING NOTE
"33 year old  Chief Complaint   Patient presents with     Back Pain     mobility has improved but now having trouble sleeping he reports numbness and tingling in his left arm.       Blood pressure 123/87, pulse 95, temperature 99  F (37.2  C), temperature source Oral, height 1.78 m (5' 10.08\"), weight 82.6 kg (182 lb), SpO2 93 %. Body mass index is 26.06 kg/m .  Patient Active Problem List   Diagnosis     Strain of neck muscle, initial encounter     Acute recurrent maxillary sinusitis     Anxiety     History of migraine     Chronic seasonal allergic rhinitis     Neck pain     Upper back pain     Cervicalgia       Wt Readings from Last 2 Encounters:   01/25/19 82.6 kg (182 lb)   01/10/19 83.1 kg (183 lb 4 oz)     BP Readings from Last 3 Encounters:   01/25/19 123/87   01/10/19 126/87   12/17/18 129/82         Current Outpatient Medications   Medication     Cholecalciferol (VITAMIN D PO)     creatine 400 MG capsule     cyclobenzaprine (FLEXERIL) 10 MG tablet     Fish Oil-Cholecalciferol (FISH OIL + D3 PO)     fluticasone (FLONASE) 50 MCG/ACT nasal spray     fluticasone (FLONASE) 50 MCG/ACT spray     LORazepam (ATIVAN) 1 MG tablet     LORazepam (ATIVAN) 1 MG tablet     study - emtricitabine-tenofovir 200-300, IDS#[id number], (TRUVADA) per tablet     No current facility-administered medications for this visit.        Social History     Tobacco Use     Smoking status: Never Smoker     Smokeless tobacco: Never Used   Substance Use Topics     Alcohol use: Yes     Comment: social     Drug use: No       Health Maintenance Due   Topic Date Due     HIV SCREEN (SYSTEM ASSIGNED)  06/20/2003     INFLUENZA VACCINE (1) 09/01/2018       No results found for: PAP      January 25, 2019 3:45 PM  "
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
## 2019-01-29 ENCOUNTER — HOSPITAL ENCOUNTER (OUTPATIENT)
Dept: MRI IMAGING | Facility: CLINIC | Age: 34
Discharge: HOME | End: 2019-01-29
Payer: COMMERCIAL

## 2019-01-29 DIAGNOSIS — M54.2 CERVICALGIA: ICD-10-CM

## 2019-01-29 PROCEDURE — 72141 MRI NECK SPINE W/O DYE: CPT

## 2019-01-29 ASSESSMENT — ANXIETY QUESTIONNAIRES
GAD7 TOTAL SCORE: 3
6. BECOMING EASILY ANNOYED OR IRRITABLE: NOT AT ALL
5. BEING SO RESTLESS THAT IT IS HARD TO SIT STILL: NOT AT ALL
7. FEELING AFRAID AS IF SOMETHING AWFUL MIGHT HAPPEN: NOT AT ALL
3. WORRYING TOO MUCH ABOUT DIFFERENT THINGS: NOT AT ALL
2. NOT BEING ABLE TO STOP OR CONTROL WORRYING: SEVERAL DAYS
1. FEELING NERVOUS, ANXIOUS, OR ON EDGE: SEVERAL DAYS

## 2019-01-29 ASSESSMENT — PATIENT HEALTH QUESTIONNAIRE - PHQ9
SUM OF ALL RESPONSES TO PHQ QUESTIONS 1-9: 4
5. POOR APPETITE OR OVEREATING: SEVERAL DAYS

## 2019-01-30 ASSESSMENT — ANXIETY QUESTIONNAIRES: GAD7 TOTAL SCORE: 3

## 2019-02-01 NOTE — TELEPHONE ENCOUNTER
RECORDS RECEIVED FROM: Cervicalgia    DATE RECEIVED: 02/01/19    NOTES STATUS DETAILS   OFFICE NOTE from referring provider Internal Dr. Mitchell 1/25/19   OFFICE NOTE from other specialist N/A    DISCHARGE SUMMARY from hospital N/A    DISCHARGE REPORT from the ER N/A    OPERATIVE REPORT N/A    MEDICATION LIST Internal    IMPLANT RECORD/STICKER N/A    LABS     CBC/DIFF N/A    CULTURES N/A    INJECTIONS DONE IN RADIOLOGY N/A    MRI Internal 1/29/19   CT SCAN N/A    XRAYS (IMAGES & REPORTS) N/A    TUMOR     PATHOLOGY  Slides & report N/A

## 2019-02-04 DIAGNOSIS — M54.2 CERVICAL PAIN: Primary | ICD-10-CM

## 2019-02-05 ENCOUNTER — PRE VISIT (OUTPATIENT)
Dept: ORTHOPEDICS | Facility: CLINIC | Age: 34
End: 2019-02-05

## 2019-02-05 ENCOUNTER — OFFICE VISIT (OUTPATIENT)
Dept: ORTHOPEDICS | Facility: CLINIC | Age: 34
End: 2019-02-05
Payer: COMMERCIAL

## 2019-02-05 ENCOUNTER — ANCILLARY PROCEDURE (OUTPATIENT)
Dept: GENERAL RADIOLOGY | Facility: CLINIC | Age: 34
End: 2019-02-05
Payer: COMMERCIAL

## 2019-02-05 VITALS — HEIGHT: 70 IN | BODY MASS INDEX: 26.05 KG/M2 | WEIGHT: 182 LBS

## 2019-02-05 DIAGNOSIS — M54.50 LUMBAR PAIN: Primary | ICD-10-CM

## 2019-02-05 RX ORDER — GABAPENTIN 300 MG/1
300 CAPSULE ORAL 3 TIMES DAILY
Qty: 90 CAPSULE | Refills: 0 | Status: SHIPPED | OUTPATIENT
Start: 2019-02-05 | End: 2019-03-08

## 2019-02-05 ASSESSMENT — ENCOUNTER SYMPTOMS
LOSS OF CONSCIOUSNESS: 0
ARTHRALGIAS: 0
FATIGUE: 0
EYE WATERING: 0
JOINT SWELLING: 0
CHILLS: 0
DECREASED APPETITE: 0
NAUSEA: 1
DISTURBANCES IN COORDINATION: 1
INSOMNIA: 1
BOWEL INCONTINENCE: 0
MEMORY LOSS: 0
MUSCLE WEAKNESS: 1
EYE PAIN: 0
EYE IRRITATION: 0
DECREASED CONCENTRATION: 1
VOMITING: 0
NECK PAIN: 1
DIZZINESS: 0
BLOOD IN STOOL: 0
EYE REDNESS: 0
INCREASED ENERGY: 0
BLOATING: 1
ALTERED TEMPERATURE REGULATION: 0
TREMORS: 0
NIGHT SWEATS: 0
STIFFNESS: 0
DEPRESSION: 1
FEVER: 0
PARALYSIS: 0
PANIC: 0
POLYPHAGIA: 0
SEIZURES: 0
CONSTIPATION: 0
WEIGHT LOSS: 1
WEAKNESS: 1
TINGLING: 1
MUSCLE CRAMPS: 0
DOUBLE VISION: 0
POLYDIPSIA: 0
HALLUCINATIONS: 0
NUMBNESS: 1
HEADACHES: 0
ABDOMINAL PAIN: 0
NERVOUS/ANXIOUS: 1
HEARTBURN: 1
SPEECH CHANGE: 0
JAUNDICE: 0
MYALGIAS: 1
BACK PAIN: 1
WEIGHT GAIN: 0
DIARRHEA: 0
RECTAL PAIN: 0

## 2019-02-05 ASSESSMENT — MIFFLIN-ST. JEOR: SCORE: 1776.8
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
## 2019-02-05 NOTE — LETTER
Verification of Appointment  2019     Seen today: yes    Patient:  Juarez Jauregui  :   1985  MRN:     [id number]  Physician: BERTA COX    Juarez Jauregui was seen today in clinic.      Electronically signed by Berta Cox MD

## 2019-02-07 ENCOUNTER — ANCILLARY PROCEDURE (OUTPATIENT)
Dept: MRI IMAGING | Facility: CLINIC | Age: 34
End: 2019-02-07
Attending: ORTHOPAEDIC SURGERY
Payer: COMMERCIAL

## 2019-02-07 ENCOUNTER — OFFICE VISIT (OUTPATIENT)
Dept: NEUROLOGY | Facility: CLINIC | Age: 34
End: 2019-02-07
Attending: ORTHOPAEDIC SURGERY
Payer: COMMERCIAL

## 2019-02-07 DIAGNOSIS — M54.50 LUMBAR PAIN: ICD-10-CM

## 2019-02-07 DIAGNOSIS — M79.622 PAIN OF LEFT UPPER ARM: Primary | ICD-10-CM

## 2019-02-07 NOTE — LETTER
2/7/2019       RE: Juarez Jauregui  [address]  [facility] [address]     Dear Colleague,    Thank you for referring your patient, Juarez Jauregui, to the [facility] EMG at [facility]. Please see a copy of my visit note below.        [facility]  Electrodiagnostic Laboratory    Nerve Conduction & EMG Report        Patient: Juarez Jauregui YOB: 1985  Patient ID: [id number] Age: 33 Years 7 Months  Gender: Male      History & Examination:This is a 33-year-old gentleman referred by Dr. Landry Murrell.  The patient has left arm weakness.  We are asked to evaluate him for a possible left brachial plexopathy or cervical radiculopathy.    Techniques:  Sensory and motor conduction studies were done with surface recording electrodes. EMG was done with a concentric needle electrode.     Results: Extensive sensory nerve studies were done on the left arm to look for brachial plexopathy.  The left median antidromic, left ulnar antidromic, left superficial radial sensory, left lateral cutaneous nerve of the forearm, left medial cutaneous nerve of the forearm and right medial cutaneous nerve of the forearm were tested.  All sensory nerve action potentials were normal and their distal conduction velocities were normal.  The left median and ulnar compound motor action potentials were normal.  Their distal latencies and conduction velocities were normal.  Needle exam of multiple muscles of the left arm was normal.    Interpretation: The EMG is normal.  There is no EMG evidence for a left brachial plexopathy or left cervical radiculopathy    EMG Physician: Eric Napier MD            Sensory NCS      Nerve / Sites Rec. Site Onset Peak Ref. NP Amp Ref. PP Amp Dist Richard Ref. Temp     ms ms ms  V  V  V cm m/s m/s  C   L MEDIAN - Dig II Anti      Wrist Dig II 2.40 3.07  23.3 10.0 39.7 14 58.4 48.0 33   L ULNAR - Dig V Anti      Wrist Dig V 2.40 3.23  26.4 8.0 28.3 12.5  52.2 48.0 33.4   L RADIAL - Snuff      Forearm Snuff 1.72 2.14  35.9 15.0 47.1 10 58.2 48.0 32.3   L MUSCULOCUTANEOUS      Elbow Forearm 1.46 1.93 3.30 45.2  69.0 10 68.6  33.1   L MED CUT N FORE      Elbow Forearm 1.88 2.45 3.30 9.1  12.4 12 64.0  32.9   R MED CUT N FORE      Elbow Forearm 1.72 2.24 3.30 10.3  11.8 10 58.2  32.4       Motor NCS      Nerve / Sites Rec. Site Lat Ref. Amp Ref. Rel Amp Dist Richard Ref. Dur. Area Temp.     ms ms mV mV % cm m/s m/s ms %  C   L MEDIAN - APB      Wrist APB 3.39 4.40 8.9 5.0 100 8   5.73 100 33.1      Elbow APB 7.55  8.3  93.6 25 60.0 48.0 5.68 89.9 33.1   L ULNAR - ADM      Wrist ADM 2.50 3.50 11.4 5.0 100 8   6.82 100 33.4      B.Elbow ADM 5.94  11.5  101 19.5 56.7 48.0 6.56 98.6 33.4      A.Elbow ADM 7.45  11.6  101 9 59.6 48.0 6.77 97.9 33.4       EMG Summary Table     Spontaneous MUAP Recruitment    IA Fib/PSW Fasc H.F. Amp Dur. PPP Pattern   L. FIRST D INTEROSS N None None None N N None Normal   L. EXT INDICIS N None None None N N None Normal   L. FLEX CARPI RAD N None None None N N None Normal   L. BICEPS N None None None N N None Normal   L. TRICEPS N None None None N N None Normal   L. DELTOID N None None None N N None Normal                       Again, thank you for allowing me to participate in the care of your patient.      Sincerely,    Eric Napier MD

## 2019-02-07 NOTE — PROGRESS NOTES
[facility]  Electrodiagnostic Laboratory    Nerve Conduction & EMG Report          Patient: Juarez Jauregui YOB: 1985  Patient ID: [id number] Age: 33 Years 7 Months  Gender: Male      History & Examination:This is a 33-year-old gentleman referred by Dr. Landry Murrell.  The patient has left arm weakness.  We are asked to evaluate him for a possible left brachial plexopathy or cervical radiculopathy.    Techniques:  Sensory and motor conduction studies were done with surface recording electrodes. EMG was done with a concentric needle electrode.     Results: Extensive sensory nerve studies were done on the left arm to look for brachial plexopathy.  The left median antidromic, left ulnar antidromic, left superficial radial sensory, left lateral cutaneous nerve of the forearm, left medial cutaneous nerve of the forearm and right medial cutaneous nerve of the forearm were tested.  All sensory nerve action potentials were normal and their distal conduction velocities were normal.  The left median and ulnar compound motor action potentials were normal.  Their distal latencies and conduction velocities were normal.  Needle exam of multiple muscles of the left arm was normal.    Interpretation: The EMG is normal.  There is no EMG evidence for a left brachial plexopathy or left cervical radiculopathy    EMG Physician: Eric Napier MD            Sensory NCS      Nerve / Sites Rec. Site Onset Peak Ref. NP Amp Ref. PP Amp Dist Richard Ref. Temp     ms ms ms  V  V  V cm m/s m/s  C   L MEDIAN - Dig II Anti      Wrist Dig II 2.40 3.07  23.3 10.0 39.7 14 58.4 48.0 33   L ULNAR - Dig V Anti      Wrist Dig V 2.40 3.23  26.4 8.0 28.3 12.5 52.2 48.0 33.4   L RADIAL - Snuff      Forearm Snuff 1.72 2.14  35.9 15.0 47.1 10 58.2 48.0 32.3   L MUSCULOCUTANEOUS      Elbow Forearm 1.46 1.93 3.30 45.2  69.0 10 68.6  33.1   L MED CUT N FORE      Elbow Forearm 1.88 2.45 3.30 9.1  12.4 12 64.0  32.9   R MED CUT N  FORE      Elbow Forearm 1.72 2.24 3.30 10.3  11.8 10 58.2  32.4       Motor NCS      Nerve / Sites Rec. Site Lat Ref. Amp Ref. Rel Amp Dist Richard Ref. Dur. Area Temp.     ms ms mV mV % cm m/s m/s ms %  C   L MEDIAN - APB      Wrist APB 3.39 4.40 8.9 5.0 100 8   5.73 100 33.1      Elbow APB 7.55  8.3  93.6 25 60.0 48.0 5.68 89.9 33.1   L ULNAR - ADM      Wrist ADM 2.50 3.50 11.4 5.0 100 8   6.82 100 33.4      B.Elbow ADM 5.94  11.5  101 19.5 56.7 48.0 6.56 98.6 33.4      A.Elbow ADM 7.45  11.6  101 9 59.6 48.0 6.77 97.9 33.4       EMG Summary Table     Spontaneous MUAP Recruitment    IA Fib/PSW Fasc H.F. Amp Dur. PPP Pattern   L. FIRST D INTEROSS N None None None N N None Normal   L. EXT INDICIS N None None None N N None Normal   L. FLEX CARPI RAD N None None None N N None Normal   L. BICEPS N None None None N N None Normal   L. TRICEPS N None None None N N None Normal   L. DELTOID N None None None N N None Normal

## 2019-02-08 ENCOUNTER — OFFICE VISIT (OUTPATIENT)
Dept: FAMILY MEDICINE | Facility: CLINIC | Age: 34
End: 2019-02-08
Payer: COMMERCIAL

## 2019-02-08 VITALS
TEMPERATURE: 98.5 F | SYSTOLIC BLOOD PRESSURE: 121 MMHG | WEIGHT: 185 LBS | HEIGHT: 70 IN | BODY MASS INDEX: 26.48 KG/M2 | OXYGEN SATURATION: 97 % | HEART RATE: 74 BPM | DIASTOLIC BLOOD PRESSURE: 82 MMHG

## 2019-02-08 DIAGNOSIS — M54.9 UPPER BACK PAIN: ICD-10-CM

## 2019-02-08 DIAGNOSIS — M25.512 ACUTE PAIN OF LEFT SHOULDER: Primary | ICD-10-CM

## 2019-02-08 DIAGNOSIS — M54.2 NECK PAIN: ICD-10-CM

## 2019-02-08 ASSESSMENT — MIFFLIN-ST. JEOR: SCORE: 1790.4

## 2019-02-08 ASSESSMENT — PAIN SCALES - GENERAL: PAINLEVEL: MODERATE PAIN (5)

## 2019-02-08 NOTE — NURSING NOTE
"33 year old  Chief Complaint   Patient presents with     Back Pain     pt reports his back feels the same no improvement.       Blood pressure 121/82, pulse 74, temperature 98.5  F (36.9  C), temperature source Oral, height 1.778 m (5' 10\"), weight 83.9 kg (185 lb), SpO2 97 %. Body mass index is 26.54 kg/m .  Patient Active Problem List   Diagnosis     Strain of neck muscle, initial encounter     Acute recurrent maxillary sinusitis     Anxiety     History of migraine     Chronic seasonal allergic rhinitis     Neck pain     Upper back pain     Cervicalgia       Wt Readings from Last 2 Encounters:   02/08/19 83.9 kg (185 lb)   02/05/19 82.6 kg (182 lb)     BP Readings from Last 3 Encounters:   02/08/19 121/82   01/25/19 123/87   01/10/19 126/87         Current Outpatient Medications   Medication     Cholecalciferol (VITAMIN D PO)     creatine 400 MG capsule     cyclobenzaprine (FLEXERIL) 10 MG tablet     Fish Oil-Cholecalciferol (FISH OIL + D3 PO)     fluticasone (FLONASE) 50 MCG/ACT nasal spray     fluticasone (FLONASE) 50 MCG/ACT spray     gabapentin (NEURONTIN) 300 MG capsule     LORazepam (ATIVAN) 1 MG tablet     LORazepam (ATIVAN) 1 MG tablet     ORDER FOR ALLERGEN IMMUNOTHERAPY     ORDER FOR ALLERGEN IMMUNOTHERAPY     study - emtricitabine-tenofovir 200-300, IDS#[id number], (TRUVADA) per tablet     No current facility-administered medications for this visit.        Social History     Tobacco Use     Smoking status: Never Smoker     Smokeless tobacco: Never Used   Substance Use Topics     Alcohol use: Yes     Comment: social     Drug use: No       Health Maintenance Due   Topic Date Due     HIV SCREEN (SYSTEM ASSIGNED)  06/20/2003     INFLUENZA VACCINE (1) 09/01/2018       No results found for: PAP      February 8, 2019 11:33 AM  "
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
## 2019-02-12 NOTE — PROGRESS NOTES
Subjective:  HPI                    Objective:  System    Physical Exam     General     ROS    Assessment/Plan:    DISCHARGE REPORT    Progress reporting period is from 04/03/2018 to 05/09/2018.       SUBJECTIVE  Subjective: When last seen on 05/09/2018, patient reported his neck mobility seemed to be back, and he was doing well overall.  Current status is unknown as patient did not return for additional follow-up visits.  Current Pain level:  As of 05/09/2018: 0/10.     Initial Pain level: 6/10.   Changes in function:  Unknown as patient did not return for additional follow-up visits.  Adverse reaction to treatment or activity: Unknown as patient did not return for additional follow-up visits.    OBJECTIVE  Objective:  As of 05/09/2018:  Cervical AROM:  B rotation WNL without pain, extension WNL, no pain.     Current objective measurements are unavailable as patient did not return for additional follow-up visits.    ASSESSMENT/PLAN  Patient was seen for 4 visits with treatment focusing on cervical retraction and extension exercises in addition to posture training to address neck pain.  He responded well to treatment and reported improvement at his last visit.  He has not returned for additional follow-up visits so current assessment is unavailable.  Updated problem list and treatment plan: Diagnosis 1:  Neck pain   No updated problem list or treatment plan as patient did not return for additional visits and is discharged from PT at this time.    STG/LTGs have been met or progress has been made towards goals:  Unknown as patient did not return for additional follow-up visits.  Assessment of Progress: The patient has not returned to therapy. Current status is unknown.  Self Management Plans:  Patient has been instructed in a home treatment program.  Patient  has been instructed in self management of symptoms.  Juarez continues to require the following intervention to meet STG and LTG's:  PT intervention is no longer  required to meet STG/LTG.    Recommendations:  Patient is discharged from PT as he did not return for further follow-up visits.    Please refer to the daily flowsheet for treatment today, total treatment time and time spent performing 1:1 timed codes.

## 2019-02-13 NOTE — TELEPHONE ENCOUNTER
RECORDS RECEIVED FROM: L shoulder pain- discuss which MRI to order- there is one ordered appt per pt   DATE RECEIVED: 02/12/19    NOTES STATUS DETAILS   OFFICE NOTE from referring provider Internal 2/7/19 Dr. Napier   OFFICE NOTE from other specialist Internal 2/5/19 Dr. Murrell   DISCHARGE SUMMARY from hospital N/A    DISCHARGE REPORT from the ER N/A    OPERATIVE REPORT N/A    MEDICATION LIST Internal    IMPLANT RECORD/STICKER N/A    LABS     CBC/DIFF N/A    CULTURES N/A    INJECTIONS DONE IN RADIOLOGY N/A    MRI In process ordered   CT SCAN N/A    XRAYS (IMAGES & REPORTS) N/A    TUMOR     PATHOLOGY  Slides & report N/A

## 2019-02-14 ENCOUNTER — PRE VISIT (OUTPATIENT)
Dept: ORTHOPEDICS | Facility: CLINIC | Age: 34
End: 2019-02-14

## 2019-02-14 ENCOUNTER — ANCILLARY PROCEDURE (OUTPATIENT)
Dept: GENERAL RADIOLOGY | Facility: CLINIC | Age: 34
End: 2019-02-14
Attending: FAMILY MEDICINE
Payer: COMMERCIAL

## 2019-02-14 ENCOUNTER — OFFICE VISIT (OUTPATIENT)
Dept: ORTHOPEDICS | Facility: CLINIC | Age: 34
End: 2019-02-14
Attending: INTERNAL MEDICINE
Payer: COMMERCIAL

## 2019-02-14 VITALS — WEIGHT: 185 LBS | RESPIRATION RATE: 16 BRPM | BODY MASS INDEX: 26.48 KG/M2 | HEIGHT: 70 IN

## 2019-02-14 DIAGNOSIS — M75.42 IMPINGEMENT SYNDROME OF LEFT SHOULDER: ICD-10-CM

## 2019-02-14 DIAGNOSIS — M25.512 LEFT SHOULDER PAIN, UNSPECIFIED CHRONICITY: ICD-10-CM

## 2019-02-14 DIAGNOSIS — M25.512 LEFT SHOULDER PAIN, UNSPECIFIED CHRONICITY: Primary | ICD-10-CM

## 2019-02-14 ASSESSMENT — MIFFLIN-ST. JEOR: SCORE: 1790.4
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
## 2019-02-14 NOTE — Clinical Note
Thank you for allowing me to see your patient in Sports Medicine Clinic.  Please see the attached copy of our visit.Sincerely,Keagan Ocasio MD
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
## 2019-02-18 ENCOUNTER — ALLIED HEALTH/NURSE VISIT (OUTPATIENT)
Dept: PULMONOLOGY | Facility: CLINIC | Age: 34
End: 2019-02-18
Attending: ALLERGY & IMMUNOLOGY
Payer: COMMERCIAL

## 2019-02-18 DIAGNOSIS — Z51.6 NEED FOR DESENSITIZATION TO ALLERGENS: Primary | ICD-10-CM

## 2019-02-18 PROCEDURE — 95117 IMMUNOTHERAPY INJECTIONS: CPT | Mod: ZF

## 2019-02-18 NOTE — NURSING NOTE
Chief Complaint   Patient presents with     Allied Health Visit     allergy injection     Dorota Allen CMA  Patient came in for weekly Allergy injection. Patient had no reaction to last weeks shots. I gave 0.05 ml of the green bottle sub X 2 cutaneous without any issues.   Patient instructed to wait in building for at least 30 min. after injections  Juarez Jauregui comes into clinic today at the request of Vitaly Messina for allergy injections    No reaction to last injection.    This service provided today was under the direct supervision of Dr. Vitaly Messina , who was available if needed.    Dorota Allen CMA

## 2019-02-21 ENCOUNTER — ALLIED HEALTH/NURSE VISIT (OUTPATIENT)
Dept: PULMONOLOGY | Facility: CLINIC | Age: 34
End: 2019-02-21
Attending: ALLERGY & IMMUNOLOGY
Payer: COMMERCIAL

## 2019-02-21 ENCOUNTER — ANCILLARY PROCEDURE (OUTPATIENT)
Dept: MRI IMAGING | Facility: CLINIC | Age: 34
End: 2019-02-21
Attending: FAMILY MEDICINE
Payer: COMMERCIAL

## 2019-02-21 ENCOUNTER — ANCILLARY PROCEDURE (OUTPATIENT)
Dept: GENERAL RADIOLOGY | Facility: CLINIC | Age: 34
End: 2019-02-21
Attending: FAMILY MEDICINE
Payer: COMMERCIAL

## 2019-02-21 DIAGNOSIS — M75.42 IMPINGEMENT SYNDROME OF LEFT SHOULDER: ICD-10-CM

## 2019-02-21 DIAGNOSIS — Z51.6 NEED FOR DESENSITIZATION TO ALLERGENS: Primary | ICD-10-CM

## 2019-02-21 PROCEDURE — 95117 IMMUNOTHERAPY INJECTIONS: CPT | Mod: ZF

## 2019-02-21 RX ORDER — LIDOCAINE HYDROCHLORIDE 10 MG/ML
30 INJECTION, SOLUTION EPIDURAL; INFILTRATION; INTRACAUDAL; PERINEURAL ONCE
Status: COMPLETED | OUTPATIENT
Start: 2019-02-21 | End: 2019-02-21

## 2019-02-21 RX ORDER — IOPAMIDOL 408 MG/ML
20 INJECTION, SOLUTION INTRATHECAL ONCE
Status: COMPLETED | OUTPATIENT
Start: 2019-02-21 | End: 2019-02-21

## 2019-02-21 RX ADMIN — IOPAMIDOL 7 ML: 408 INJECTION, SOLUTION INTRATHECAL at 11:23

## 2019-02-21 RX ADMIN — LIDOCAINE HYDROCHLORIDE 7 ML: 10 INJECTION, SOLUTION EPIDURAL; INFILTRATION; INTRACAUDAL; PERINEURAL at 11:23

## 2019-02-21 NOTE — NURSING NOTE
Chief Complaint   Patient presents with     RECHECK     Allergy Injection    Nelly Martínez CMA  Patient instructed to wait in building for at least 30 min. after injections  Patient came in for weekly Allergy injection. Patient had no reaction to last weeks shots. I gave 0.1 ml of the blue bottle x2 sub cutaneous without any issues.   Juarez Jauregui comes into clinic today at the request of Vitaly Messina for allergy injections    No reaction to last injection.    This service provided today was under the direct supervision of Dr. Doll, who was available if needed.    Nelly Martínez CMA

## 2019-02-25 ENCOUNTER — ALLIED HEALTH/NURSE VISIT (OUTPATIENT)
Dept: PULMONOLOGY | Facility: CLINIC | Age: 34
End: 2019-02-25
Attending: ALLERGY & IMMUNOLOGY
Payer: COMMERCIAL

## 2019-02-25 ENCOUNTER — OFFICE VISIT (OUTPATIENT)
Dept: ORTHOPEDICS | Facility: CLINIC | Age: 34
End: 2019-02-25
Payer: COMMERCIAL

## 2019-02-25 DIAGNOSIS — M75.00 ADHESIVE CAPSULITIS OF SHOULDER, UNSPECIFIED LATERALITY: ICD-10-CM

## 2019-02-25 DIAGNOSIS — Z51.6 NEED FOR DESENSITIZATION TO ALLERGENS: Primary | ICD-10-CM

## 2019-02-25 DIAGNOSIS — M25.519 ARTHRALGIA OF SHOULDER, UNSPECIFIED LATERALITY: Primary | ICD-10-CM

## 2019-02-25 DIAGNOSIS — M75.100 TEAR OF ROTATOR CUFF, UNSPECIFIED LATERALITY, UNSPECIFIED TEAR EXTENT: ICD-10-CM

## 2019-02-25 PROCEDURE — 95117 IMMUNOTHERAPY INJECTIONS: CPT | Mod: ZF

## 2019-02-25 NOTE — NURSING NOTE
Chief Complaint   Patient presents with     RECHECK     Allergy Injections     Patient instructed to wait in building for at least 30 min. after injections  Patient came in for weekly Allergy injection. Patient had no reaction to last weeks shots. I gave 0.2 ml of the green bottle x2 sub cutaneous without any issues.   Juarez Jauregui comes into clinic today at the request of Vitaly Messina for allergy injections    No reaction to last injection.    This service provided today was under the direct supervision of Dr. Messina, who was available if needed.    Nelly Martínez, CMA
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
## 2019-02-27 ENCOUNTER — MYC REFILL (OUTPATIENT)
Dept: FAMILY MEDICINE | Facility: CLINIC | Age: 34
End: 2019-02-27

## 2019-02-27 DIAGNOSIS — M54.9 UPPER BACK PAIN: ICD-10-CM

## 2019-02-27 DIAGNOSIS — M54.2 CERVICALGIA: ICD-10-CM

## 2019-02-27 RX ORDER — CYCLOBENZAPRINE HCL 10 MG
TABLET ORAL
Qty: 30 TABLET | Refills: 0 | Status: SHIPPED | OUTPATIENT
Start: 2019-02-27 | End: 2019-04-24

## 2019-02-27 NOTE — TELEPHONE ENCOUNTER
Last Office Visit: 2/8/19  [place] [facility]  Medication last refilled: 1/25/19 #30 WITH 0 REFILLS    Routing refill request to provider for review/approval because: Drug not on the [facility] refill protocol     Chana Gaytan RN  02/27/19  4:30 PM

## 2019-02-28 ENCOUNTER — ALLIED HEALTH/NURSE VISIT (OUTPATIENT)
Dept: PULMONOLOGY | Facility: CLINIC | Age: 34
End: 2019-02-28
Attending: ALLERGY & IMMUNOLOGY
Payer: COMMERCIAL

## 2019-02-28 DIAGNOSIS — Z51.6 NEED FOR DESENSITIZATION TO ALLERGENS: Primary | ICD-10-CM

## 2019-02-28 PROCEDURE — 95117 IMMUNOTHERAPY INJECTIONS: CPT | Mod: ZF

## 2019-02-28 NOTE — NURSING NOTE
Chief Complaint   Patient presents with     RECHECK     Alleryg Injections     Patient instructed to wait in building for at least 30 min. after injections  Patient came in for weekly Allergy injection. Patient had no reaction to last weeks shots. I gave 0.3 ml of the green bottle sub cutaneous without any issues.   Juarez Jauregui comes into clinic today at the request of Vitaly Messina for allergy injections    No reaction to last injection.    This service provided today was under the direct supervision of Dr. Perlman, who was available if needed.    Nelly Martínez, CMA

## 2019-03-04 ENCOUNTER — ALLIED HEALTH/NURSE VISIT (OUTPATIENT)
Dept: PULMONOLOGY | Facility: CLINIC | Age: 34
End: 2019-03-04
Attending: ALLERGY & IMMUNOLOGY
Payer: COMMERCIAL

## 2019-03-04 DIAGNOSIS — Z51.6 NEED FOR DESENSITIZATION TO ALLERGENS: Primary | ICD-10-CM

## 2019-03-04 PROCEDURE — 95117 IMMUNOTHERAPY INJECTIONS: CPT | Mod: ZF

## 2019-03-04 NOTE — NURSING NOTE
Patient instructed to wait in building for at least 30 min. after injections.  Patient came in for weekly Allergy injection. Patient had no reaction to last weeks shots. I gave 0.4 ml of the green bottle  X 2 subcutaneous without any issues.   Patient came into clinic today at the request of Vitaly Messina for allergy injections    No reaction to last injection.    This service provided today was under the direct supervision of Dr Messina. , who was available if needed.    ERVIN Crooks

## 2019-03-07 ENCOUNTER — ALLIED HEALTH/NURSE VISIT (OUTPATIENT)
Dept: PULMONOLOGY | Facility: CLINIC | Age: 34
End: 2019-03-07
Attending: ALLERGY & IMMUNOLOGY
Payer: COMMERCIAL

## 2019-03-07 DIAGNOSIS — Z51.6 NEED FOR DESENSITIZATION TO ALLERGENS: Primary | ICD-10-CM

## 2019-03-07 PROCEDURE — 95117 IMMUNOTHERAPY INJECTIONS: CPT | Mod: ZF
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
## 2019-03-07 NOTE — NURSING NOTE
Patient instructed to wait in building for at least 30 min. after injections.  Patient came in for weekly Allergy injection. Patient had no reaction to last weeks shots. I gave 0.5 ml of the green bottle  X 2 subcutaneous without any issues.   Patient came into clinic today at the request of Vitaly Messina for allergy injections    No reaction to last injection.    This service provided today was under the direct supervision of Dr Chaves , who was available if needed.    ERVIN Crooks

## 2019-03-08 ENCOUNTER — OFFICE VISIT (OUTPATIENT)
Dept: FAMILY MEDICINE | Facility: CLINIC | Age: 34
End: 2019-03-08
Payer: COMMERCIAL

## 2019-03-08 VITALS
BODY MASS INDEX: 26.48 KG/M2 | OXYGEN SATURATION: 95 % | HEART RATE: 77 BPM | SYSTOLIC BLOOD PRESSURE: 120 MMHG | WEIGHT: 185 LBS | HEIGHT: 70 IN | DIASTOLIC BLOOD PRESSURE: 85 MMHG | TEMPERATURE: 98.1 F

## 2019-03-08 DIAGNOSIS — Z01.818 PREOP GENERAL PHYSICAL EXAM: Primary | ICD-10-CM

## 2019-03-08 DIAGNOSIS — M54.50 LUMBAR PAIN: ICD-10-CM

## 2019-03-08 LAB
ERYTHROCYTE [DISTWIDTH] IN BLOOD BY AUTOMATED COUNT: 13.2 % (ref 10–15)
HCT VFR BLD AUTO: 48.9 % (ref 40–53)
HGB BLD-MCNC: 16.3 G/DL (ref 13.3–17.7)
MCH RBC QN AUTO: 31.5 PG (ref 26.5–33)
MCHC RBC AUTO-ENTMCNC: 33.3 G/DL (ref 31.5–36.5)
MCV RBC AUTO: 94 FL (ref 78–100)
PLATELET # BLD AUTO: 284 10E9/L (ref 150–450)
RBC # BLD AUTO: 5.18 10E12/L (ref 4.4–5.9)
WBC # BLD AUTO: 6.6 10E9/L (ref 4–11)

## 2019-03-08 RX ORDER — GABAPENTIN 300 MG/1
300 CAPSULE ORAL 3 TIMES DAILY
Qty: 90 CAPSULE | Refills: 0
Start: 2019-03-08 | End: 2019-04-24

## 2019-03-08 ASSESSMENT — MIFFLIN-ST. JEOR: SCORE: 1790.4

## 2019-03-08 NOTE — NURSING NOTE
"33 year old  Chief Complaint   Patient presents with     Pre-Op Exam     left arm rotater cuff surgery       Blood pressure 120/85, pulse 77, temperature 98.1  F (36.7  C), temperature source Oral, height 1.778 m (5' 10\"), weight 83.9 kg (185 lb), SpO2 95 %. Body mass index is 26.54 kg/m .  Patient Active Problem List   Diagnosis     Strain of neck muscle, initial encounter     Acute recurrent maxillary sinusitis     Anxiety     History of migraine     Chronic seasonal allergic rhinitis     Neck pain     Upper back pain     Cervicalgia       Wt Readings from Last 2 Encounters:   03/08/19 83.9 kg (185 lb)   02/14/19 83.9 kg (185 lb)     BP Readings from Last 3 Encounters:   03/08/19 120/85   02/08/19 121/82   01/25/19 123/87         Current Outpatient Medications   Medication     Cholecalciferol (VITAMIN D PO)     creatine 400 MG capsule     cyclobenzaprine (FLEXERIL) 10 MG tablet     Fish Oil-Cholecalciferol (FISH OIL + D3 PO)     fluticasone (FLONASE) 50 MCG/ACT spray     gabapentin (NEURONTIN) 300 MG capsule     LORazepam (ATIVAN) 1 MG tablet     ORDER FOR ALLERGEN IMMUNOTHERAPY     ORDER FOR ALLERGEN IMMUNOTHERAPY     study - emtricitabine-tenofovir 200-300, IDS#[id number], (TRUVADA) per tablet     No current facility-administered medications for this visit.        Social History     Tobacco Use     Smoking status: Never Smoker     Smokeless tobacco: Never Used   Substance Use Topics     Alcohol use: Yes     Comment: social     Drug use: No       Health Maintenance Due   Topic Date Due     HIV SCREEN (SYSTEM ASSIGNED)  06/20/2003     PREVENTIVE CARE VISIT  03/20/2019       No results found for: PAP      March 8, 2019 9:00 AM  "

## 2019-03-11 ENCOUNTER — ALLIED HEALTH/NURSE VISIT (OUTPATIENT)
Dept: PULMONOLOGY | Facility: CLINIC | Age: 34
End: 2019-03-11
Attending: ALLERGY & IMMUNOLOGY
Payer: COMMERCIAL

## 2019-03-11 DIAGNOSIS — Z51.6 NEED FOR DESENSITIZATION TO ALLERGENS: Primary | ICD-10-CM

## 2019-03-11 PROCEDURE — 95117 IMMUNOTHERAPY INJECTIONS: CPT | Mod: ZF

## 2019-03-11 NOTE — NURSING NOTE
Chief Complaint   Patient presents with     Allied Health Visit     allergy injection     Dorota Allen CMA  Patient came in for weekly Allergy injection. Patient had no reaction to last weeks shots. I gave 0.05 ml of the blue bottle sub X 2 cutaneous without any issues.   Patient instructed to wait in building for at least 30 min. after injections  Juarez Jauregui comes into clinic today at the request of Vitaly Messina for allergy injections    No reaction to last injection.    This service provided today was under the direct supervision of Dr. Vitaly Messina , who was available if needed.    Dorota Allen CMA

## 2019-03-15 ENCOUNTER — ALLIED HEALTH/NURSE VISIT (OUTPATIENT)
Dept: PULMONOLOGY | Facility: CLINIC | Age: 34
End: 2019-03-15
Attending: ALLERGY & IMMUNOLOGY
Payer: COMMERCIAL

## 2019-03-15 DIAGNOSIS — Z51.6 NEED FOR DESENSITIZATION TO ALLERGENS: Primary | ICD-10-CM

## 2019-03-15 PROCEDURE — 95115 IMMUNOTHERAPY ONE INJECTION: CPT | Mod: ZF

## 2019-03-15 NOTE — NURSING NOTE
Chief Complaint   Patient presents with     Allied Health Visit     Patient came in for weekly Allergy injection. Patient had no reaction to last weeks shots. I gave 0.1 ml of the Blue bottle sub X 1 cutaneous without any issues.   Patient instructed to wait in building for at least 30 min. after injections  Juarez Jauregui comes into clinic today at the request of Vitaly Messina for allergy injections    No reaction to last injection.    This service provided today was under the direct supervision of Dr. barrios, who was available if needed.    Dorota Allen, CMA

## 2019-03-19 ENCOUNTER — ALLIED HEALTH/NURSE VISIT (OUTPATIENT)
Dept: PULMONOLOGY | Facility: CLINIC | Age: 34
End: 2019-03-19
Attending: ALLERGY & IMMUNOLOGY
Payer: COMMERCIAL

## 2019-03-19 DIAGNOSIS — Z51.6 NEED FOR DESENSITIZATION TO ALLERGENS: ICD-10-CM

## 2019-03-19 DIAGNOSIS — T80.90XA: Primary | ICD-10-CM

## 2019-03-19 PROCEDURE — 95117 IMMUNOTHERAPY INJECTIONS: CPT | Mod: ZF

## 2019-03-19 RX ORDER — BENZOCAINE/MENTHOL 6 MG-10 MG
LOZENGE MUCOUS MEMBRANE 2 TIMES DAILY
Status: DISCONTINUED | OUTPATIENT
Start: 2019-03-19 | End: 2021-08-20

## 2019-03-19 NOTE — NURSING NOTE
Chief Complaint   Patient presents with     RECHECK     Allergy Injection     Patient instructed to wait in building for at least 30 min. after injections  Patient came in for weekly Allergy injection. Patient had no reaction to last weeks shots. I gave 0.2 ml of the blue bottle x2 sub cutaneous without any issues.   Juarez Jauregui comes into clinic today at the request of Vitaly Messina for allergy injections    No reaction to last injection.    This service provided today was under the direct supervision of Dr. Lei, who was available if needed.    Nelly Martínez, CMA

## 2019-03-25 ENCOUNTER — ALLIED HEALTH/NURSE VISIT (OUTPATIENT)
Dept: PULMONOLOGY | Facility: CLINIC | Age: 34
End: 2019-03-25
Attending: ALLERGY & IMMUNOLOGY
Payer: COMMERCIAL

## 2019-03-25 DIAGNOSIS — Z51.6 NEED FOR DESENSITIZATION TO ALLERGENS: Primary | ICD-10-CM

## 2019-03-25 NOTE — NURSING NOTE
Chief Complaint   Patient presents with     RECHECK     Allergy Injections    Nelly Martínez CMA  Patient instructed to wait in building for at least 30 min. after injections  Patient came in for weekly Allergy injection. Patient had no reaction to last weeks shots. I gave 0.3 ml of the blue  Bottle x2 sub cutaneous without any issues.   Juarez Jauregui comes into clinic today at the request of Vitaly Messina for allergy injections    No reaction to last injection.    This service provided today was under the direct supervision of Dr. Messina, who was available if needed.    Nelly Martínez CMA

## 2019-03-28 ENCOUNTER — ALLIED HEALTH/NURSE VISIT (OUTPATIENT)
Dept: PULMONOLOGY | Facility: CLINIC | Age: 34
End: 2019-03-28
Attending: ALLERGY & IMMUNOLOGY
Payer: COMMERCIAL

## 2019-03-28 DIAGNOSIS — Z51.6 NEED FOR DESENSITIZATION TO ALLERGENS: Primary | ICD-10-CM

## 2019-03-28 PROCEDURE — G0463 HOSPITAL OUTPT CLINIC VISIT: HCPCS | Mod: ZF

## 2019-04-01 ENCOUNTER — ALLIED HEALTH/NURSE VISIT (OUTPATIENT)
Dept: PULMONOLOGY | Facility: CLINIC | Age: 34
End: 2019-04-01
Attending: ALLERGY & IMMUNOLOGY
Payer: COMMERCIAL

## 2019-04-01 DIAGNOSIS — Z51.6 NEED FOR DESENSITIZATION TO ALLERGENS: Primary | ICD-10-CM

## 2019-04-01 PROCEDURE — 95117 IMMUNOTHERAPY INJECTIONS: CPT | Mod: ZF

## 2019-04-01 NOTE — NURSING NOTE
Patient instructed to wait in building for at least 30 min. after injections.  Patient came in for weekly Allergy injection. Patient had no reaction to last weeks shots. I gave 0.5 ml of the blue bottle  X 2 subcutaneous without any issues.   Patient came into clinic today at the request of Vitaly Messina for allergy injections    No reaction to last injection.    This service provided today was under the direct supervision of Dr Messina. , who was available if needed.    ERVIN Crooks

## 2019-04-09 ENCOUNTER — ALLIED HEALTH/NURSE VISIT (OUTPATIENT)
Dept: PULMONOLOGY | Facility: CLINIC | Age: 34
End: 2019-04-09
Attending: ALLERGY & IMMUNOLOGY
Payer: COMMERCIAL

## 2019-04-09 DIAGNOSIS — Z51.6 NEED FOR DESENSITIZATION TO ALLERGENS: Primary | ICD-10-CM

## 2019-04-09 PROCEDURE — 95117 IMMUNOTHERAPY INJECTIONS: CPT | Mod: ZF

## 2019-04-09 NOTE — NURSING NOTE
Chief Complaint   Patient presents with     RECHECK     Allergy Injections    Patient instructed to wait in building for at least 30 min. after injections  Patient came in for weekly Allergy injection. Patient had no reaction to last weeks shots. I gave 0.05 ml of the yellow bottle x2 sub cutaneous without any issues.   Juarez Jauregui comes into clinic today at the request of Vitaly Messina for allergy injections    No reaction to last injection.    This service provided today was under the direct supervision of Dr. Hernandez, who was available if needed.    Nelly Martínez, CMA

## 2019-04-12 ENCOUNTER — ALLIED HEALTH/NURSE VISIT (OUTPATIENT)
Dept: PULMONOLOGY | Facility: CLINIC | Age: 34
End: 2019-04-12
Attending: ALLERGY & IMMUNOLOGY
Payer: COMMERCIAL

## 2019-04-12 DIAGNOSIS — Z51.6 NEED FOR DESENSITIZATION TO ALLERGENS: Primary | ICD-10-CM

## 2019-04-12 PROCEDURE — 95117 IMMUNOTHERAPY INJECTIONS: CPT | Mod: ZF

## 2019-04-12 NOTE — NURSING NOTE
Chief Complaint   Patient presents with     Allied Health Visit     allergy injection      Patient came in for weekly Allergy injection. Patient had no reaction to last weeks shots. I gave 0.1 ml of the yellow bottle sub X 2 cutaneous without any issues.   Patient instructed to wait in building for at least 30 min. after injections  Juarez Jauregui comes into clinic today at the request of Vitaly Messina for allergy injections    No reaction to last injection.    This service provided today was under the direct supervision of Dr. Radha Allne, who was available if needed.    Dorota Allen, CMA

## 2019-04-15 ENCOUNTER — ALLIED HEALTH/NURSE VISIT (OUTPATIENT)
Dept: PULMONOLOGY | Facility: CLINIC | Age: 34
End: 2019-04-15
Attending: ALLERGY & IMMUNOLOGY
Payer: COMMERCIAL

## 2019-04-15 DIAGNOSIS — Z51.6 NEED FOR DESENSITIZATION TO ALLERGENS: Primary | ICD-10-CM

## 2019-04-15 PROCEDURE — 95117 IMMUNOTHERAPY INJECTIONS: CPT | Mod: ZF

## 2019-04-15 NOTE — NURSING NOTE
Chief Complaint   Patient presents with     Allied Health Visit     Allergy injection     Patient came in for weekly Allergy injection. Patient had no reaction to last weeks shots. I gave 0.15 ml of the yellow bottle sub X 2 cutaneous without any issues.   Patient instructed to wait in building for at least 30 min. after injections  Juarez Jauregui comes into clinic today at the request of Vitaly Messina for allergy injections    No reaction to last injection.    This service provided today was under the direct supervision of Dr. Vitaly Messina , who was available if needed.    Dorota Allen, CMA

## 2019-04-19 ENCOUNTER — ALLIED HEALTH/NURSE VISIT (OUTPATIENT)
Dept: PULMONOLOGY | Facility: CLINIC | Age: 34
End: 2019-04-19
Attending: ALLERGY & IMMUNOLOGY
Payer: COMMERCIAL

## 2019-04-19 DIAGNOSIS — Z51.6 NEED FOR DESENSITIZATION TO ALLERGENS: Primary | ICD-10-CM

## 2019-04-19 PROCEDURE — 95117 IMMUNOTHERAPY INJECTIONS: CPT | Mod: ZF

## 2019-04-22 ENCOUNTER — ALLIED HEALTH/NURSE VISIT (OUTPATIENT)
Dept: PULMONOLOGY | Facility: CLINIC | Age: 34
End: 2019-04-22
Attending: ALLERGY & IMMUNOLOGY
Payer: COMMERCIAL

## 2019-04-22 DIAGNOSIS — Z51.6 NEED FOR DESENSITIZATION TO ALLERGENS: Primary | ICD-10-CM

## 2019-04-22 PROCEDURE — 95117 IMMUNOTHERAPY INJECTIONS: CPT | Mod: ZF

## 2019-04-22 NOTE — PATIENT INSTRUCTIONS
Miralax powder every day    Fiber 25-30 gram  Fiber one cereal  Raisin bran    Avoid  Cheese  Starchy, pasta, rice potatoes  Bananas      Before Your Surgery      Call your surgeon if there is any change in your health. This includes signs of a cold or flu (such as a sore throat, runny nose, cough, rash or fever).    Do not smoke, drink alcohol or take over the counter medicine (unless your surgeon or primary care doctor tells you to) for the 24 hours before and after surgery.    If you take prescribed drugs: Follow your doctor s orders about which medicines to take and which to stop until after surgery.    Eating and drinking prior to surgery: follow the instructions from your surgeon    Take a shower or bath the night before surgery. Use the soap your surgeon gave you to gently clean your skin. If you do not have soap from your surgeon, use your regular soap. Do not shave or scrub the surgery site.  Wear clean pajamas and have clean sheets on your bed.

## 2019-04-22 NOTE — PROGRESS NOTES
[facility]  [address]  [facility] [address]  [phone]  Dept: [phone]    PRE-OP EVALUATION:  Today's date: 2019    Juarez Jauregui (: 1985) presents for pre-operative evaluation assessment as requested by Dr. Edmondson ([facility]).  He requires evaluation and anesthesia risk assessment prior to undergoing surgery/procedure for treatment of ruptured left biceps tendon  Proposed Surgery/ Procedure: Repair of ruptured left biceps tendon  Date of Surgery/ Procedure: 19  Time of Surgery/ Procedure: [facility]  Hospital/Surgical Facility: [facility]   Fax number for surgical facility:[phone]  Primary Physician: Danielle Mitchell  Type of Anesthesia Anticipated: General    Patient has a Health Care Directive or Living Will:  NO    1. NO - Do you have a history of heart attack, stroke, stent, bypass or surgery on an artery in the head, neck, heart or legs?  2. NO - Do you ever have any pain or discomfort in your chest?  3. NO - Do you have a history of  Heart Failure?  4. NO - Are you troubled by shortness of breath when: walking on the level, up a slight hill or at night?  5. NO - Do you currently have a cold, bronchitis or other respiratory infection?  6. NO - Do you have a cough, shortness of breath or wheezing?  7. NO - Do you sometimes get pains in the calves of your legs when you walk?  8. NO - Do you or anyone in your family have previous history of blood clots?  9. NO - Do you or does anyone in your family have a serious bleeding problem such as prolonged bleeding following surgeries or cuts?  10. NO - Have you ever had problems with anemia or been told to take iron pills?  11. NO - Have you had any abnormal blood loss such as black, tarry or bloody stools, or abnormal vaginal bleeding?  12. NO - Have you ever had a blood transfusion?  13. NO - Have you or any of your relatives ever had problems with  anesthesia?  14. NO - Do you have sleep apnea, excessive snoring or daytime drowsiness?  15. NO - Do you have any prosthetic heart valves?  16. NO - Do you have prosthetic joints?  17. NO - Is there any chance that you may be pregnant?      HPI:     HPI related to upcoming procedure:   Preoperative assessment  Juarez is a 34 yo male who recently had left rotator cuff surgery. His left arm is in a splint. Unfortunately, he ruptured his left biceps tendon, trying to reached for keys that were falling. He felt and heard the tendon snap.  Right hand dominant. . He is taking 2 tablets of Tylenol#3 q 8 hours during the day. He takes 2 oxycodone tablets at bedtime to help him sleep. He is using Miralax to treat constipation.     Juarez reports he is experiencing nausea, likely from the narcotics.  He was prescribed a medication (name unknown) for nausea, however his insurance company will not fill more than 18 pills every 21 days. He is asking for other options to help nausea.       bP has no history of heart dx,lung dx, hypertension or DM. Nonsmoker. No hx of sleep apnea. No personal or family history of bleeding or clotting disorders. No personal or family history of intolerance to anesthesia. He has not had any previous complications with surgeries.      MEDICAL HISTORY:     Patient Active Problem List    Diagnosis Date Noted     Upper back pain 01/14/2019     Priority: Medium     Cervicalgia 01/14/2019     Priority: Medium     Neck pain 04/03/2018     Priority: Medium     Strain of neck muscle, initial encounter 03/20/2018     Priority: Medium     Acute recurrent maxillary sinusitis 03/20/2018     Priority: Medium     Anxiety 03/20/2018     Priority: Medium     History of migraine 03/20/2018     Priority: Medium     Chronic seasonal allergic rhinitis 03/20/2018     Priority: Medium      Past Medical History:   Diagnosis Date     Prostatitis      Past Surgical History:   Procedure Laterality Date     COLONOSCOPY   10/2007     DENTAL SURGERY       Current Outpatient Medications   Medication Sig Dispense Refill     creatine 400 MG capsule Take 3 po BID 90 capsule 3     HYDROcodone-acetaminophen (NORCO) 5-325 MG tablet Take 1 tablet by mouth       LORazepam (ATIVAN) 1 MG tablet Take 1 tablet (1 mg) by mouth every 8 hours as needed for anxiety 20 tablet 0     ORDER FOR ALLERGEN IMMUNOTHERAPY Name of Mix: Mix #1  Cat, Grass, Ragweed, Tree   Cat Hair, Standardized A.P. 10,000 BAU/mL, HS  0.5 ml   Birch Mix PRW 1:20 w/v, HS  0.3 ml  Cedar, Red 1:20 w/v, HS  0.3 ml  Toño (Std) 100,000 BAU/mL, HS 0.3 ml  Ragweed, Mix (Giant, Short) 1:20 w/v, HS 0.3 ml  Diluent: HSA qs to 5ml  Red, yellow, blue, green 5 mL PRN     ORDER FOR ALLERGEN IMMUNOTHERAPY Name of Mix: Mix #2  Mold  Alternaria 10,000 pnu/mL (1:20 w/v), ALK  0.2 ml  Diluent: HSA qs to 5ml   Red, yellow, blue, green 5 mL 1     study - emtricitabine-tenofovir 200-300, IDS#[id number], (TRUVADA) per tablet Take 1 tablet by mouth daily 30 tablet      Cholecalciferol (VITAMIN D PO)        Fish Oil-Cholecalciferol (FISH OIL + D3 PO)        fluticasone (FLONASE) 50 MCG/ACT spray Spray 2 sprays into both nostrils daily (Patient not taking: Reported on 4/24/2019) 1 Bottle 11     OTC products: None, except as noted above    Allergies   Allergen Reactions     Neosporin Af [Miconazole]      Adhesive Tape Rash     Topical creams: neosporin and bacitracin     Bacitracin Rash     Bacitracin-Polymyxin B Rash and GI Disturbance     No Clinical Screening - See Comments Rash     Topical creams: neosporin and bacitracin     Triple Antibiotic Rash      Latex Allergy: NO    Social History     Tobacco Use     Smoking status: Never Smoker     Smokeless tobacco: Never Used   Substance Use Topics     Alcohol use: Yes     Comment: social     History   Drug Use No       REVIEW OF SYSTEMS:   Constitutional, neuro, ENT, endocrine, pulmonary, cardiac, gastrointestinal, genitourinary, musculoskeletal, integument  "and psychiatric systems are negative, except as otherwise noted.    EXAM:   /89   Pulse 72   Temp 98.7  F (37.1  C) (Oral)   Ht 1.778 m (5' 10\")   Wt 78.6 kg (173 lb 4 oz)   SpO2 95%   BMI 24.86 kg/m      GENERAL APPEARANCE:Alert and no distress     EYES: EOMI,  PERRL     HENT: ear canals and TM's normal and nose and mouth without ulcers or lesions     NECK: no adenopathy,  thyroid normal to palpation     RESP: lungs clear to auscultation - no rales, rhonchi or wheezes     CV: regular rates and rhythm, normal S1 S2, no murmur     ABDOMEN:  soft, nontender, no HSM or masses and bowel sounds normal     MS: Left arm in splint, no swelling or redness over arm. Biceps muscle of left arm lying proximal to antecubital     SKIN: no suspicious lesions or rashes     NEURO: Normal strength and tone, sensory exam grossly normal, mentation intact and speech normal, +1 reflexes. Left arm not tested     PSYCH: mentation appears normal. and affect normal/bright   EXT: normal cap refill of fingers on left hand, sensation intact per pt, no lower extremity edema    DIAGNOSTICS:   No labs or EKG required for low risk surgery (cataract, skin procedure, breast biopsy, etc)    Recent Labs   Lab Test 03/08/19  0942 03/20/18  1017 03/20/18  0935 05/24/15  1910   HGB 16.3 15.8  --  16.7    298  --  352   NA  --   --  139.0 142   POTASSIUM  --   --  4.2 3.3*   CR  --   --  1.3 0.88        IMPRESSION:   (Z01.818) Preop general physical exam  (primary encounter diagnosis)  Comment: elective repair of ruptured biceps tendon, left arm  Plan: No contraindication to procedure. Proceed as planned. Pt is instructed to hold any vitamins, herbs, supplements and NSAIDs 10 days prior to surgery. He may take any prescription medications with a small sip of water on the morning of the procedure.     Recommend he contact his surgeon regarding antinausea medicine prescription.     The proposed surgical procedure is considered INTERMEDIATE " risk.    REVISED CARDIAC RISK INDEX  The patient has the following serious cardiovascular risks for perioperative complications such as (MI, PE, VFib and 3  AV Block):  No serious cardiac risks  INTERPRETATION: 0 risks: Class I (very low risk - 0.4% complication rate)    The patient has the following additional risks for perioperative complications:  No identified additional risks      ICD-10-CM    1. Preop general physical exam Z01.818        RECOMMENDATIONS:     --Patient is to take all scheduled medications on the day of surgery. Pt is instructed to hold any vitamins, herbs, supplements and NSAIDs 10 days prior to surgery. He may take any prescription medications with a small sip of water on the morning of the procedure.     APPROVAL GIVEN to proceed with proposed procedure, without further diagnostic evaluation       Signed Electronically by: Danielle Mitchell MD    Copy of this evaluation report is provided to requesting physician.    [facility] Preop Guidelines    Revised Cardiac Risk Index

## 2019-04-22 NOTE — NURSING NOTE
Chief Complaint   Patient presents with     Allied Health Visit     Allergy injection     Patient came in for weekly Allergy injection. Patient had no reaction to last weeks shots. I gave 0.3 ml of the yellow bottle sub X 2 cutaneous without any issues.   Patient instructed to wait in building for at least 30 min. after injections  Juarez Jauregui comes into clinic today at the request of Vitaly Messina for allergy injections    No reaction to last injection.    This service provided today was under the direct supervision of Dr. Vitaly Messina , who was available if needed.    Dorota Allen, CMA

## 2019-04-24 ENCOUNTER — OFFICE VISIT (OUTPATIENT)
Dept: FAMILY MEDICINE | Facility: CLINIC | Age: 34
End: 2019-04-24
Payer: COMMERCIAL

## 2019-04-24 VITALS
DIASTOLIC BLOOD PRESSURE: 89 MMHG | HEART RATE: 72 BPM | OXYGEN SATURATION: 95 % | SYSTOLIC BLOOD PRESSURE: 125 MMHG | BODY MASS INDEX: 24.8 KG/M2 | TEMPERATURE: 98.7 F | WEIGHT: 173.25 LBS | HEIGHT: 70 IN

## 2019-04-24 DIAGNOSIS — Z01.818 PREOP GENERAL PHYSICAL EXAM: Primary | ICD-10-CM

## 2019-04-24 RX ORDER — HYDROCODONE BITARTRATE AND ACETAMINOPHEN 5; 325 MG/1; MG/1
1 TABLET ORAL
COMMUNITY
Start: 2017-05-22 | End: 2019-11-11

## 2019-04-24 RX ORDER — OXYCODONE AND ACETAMINOPHEN 5; 325 MG/1; MG/1
TABLET ORAL
Qty: 28 TABLET | Refills: 0 | COMMUNITY
Start: 2019-04-24 | End: 2019-11-11

## 2019-04-24 ASSESSMENT — MIFFLIN-ST. JEOR: SCORE: 1737.11

## 2019-04-24 NOTE — NURSING NOTE
"33 year old  Chief Complaint   Patient presents with     Pre-Op Exam       Blood pressure 125/89, pulse 72, temperature 98.7  F (37.1  C), temperature source Oral, height 1.778 m (5' 10\"), weight 78.6 kg (173 lb 4 oz), SpO2 95 %. Body mass index is 24.86 kg/m .  Patient Active Problem List   Diagnosis     Strain of neck muscle, initial encounter     Acute recurrent maxillary sinusitis     Anxiety     History of migraine     Chronic seasonal allergic rhinitis     Neck pain     Upper back pain     Cervicalgia       Wt Readings from Last 2 Encounters:   04/24/19 78.6 kg (173 lb 4 oz)   03/08/19 83.9 kg (185 lb)     BP Readings from Last 3 Encounters:   04/24/19 125/89   03/08/19 120/85   02/08/19 121/82         Current Outpatient Medications   Medication     acetaminophen-codeine (TYLENOL #2) 300-15 MG per tablet     creatine 400 MG capsule     HYDROcodone-acetaminophen (NORCO) 5-325 MG tablet     LORazepam (ATIVAN) 1 MG tablet     ORDER FOR ALLERGEN IMMUNOTHERAPY     ORDER FOR ALLERGEN IMMUNOTHERAPY     study - emtricitabine-tenofovir 200-300, IDS#[id number], (TRUVADA) per tablet     Cholecalciferol (VITAMIN D PO)     cyclobenzaprine (FLEXERIL) 10 MG tablet     Fish Oil-Cholecalciferol (FISH OIL + D3 PO)     fluticasone (FLONASE) 50 MCG/ACT spray     gabapentin (NEURONTIN) 300 MG capsule     Current Facility-Administered Medications   Medication     hydrocortisone (CORTAID) 1 % cream       Social History     Tobacco Use     Smoking status: Never Smoker     Smokeless tobacco: Never Used   Substance Use Topics     Alcohol use: Yes     Comment: social     Drug use: No       Health Maintenance Due   Topic Date Due     HIV SCREEN (SYSTEM ASSIGNED)  06/20/2003     PHQ-2  01/01/2019     PREVENTIVE CARE VISIT  03/20/2019       No results found for: PAP      April 24, 2019 3:44 PM  "

## 2019-04-26 ENCOUNTER — ALLIED HEALTH/NURSE VISIT (OUTPATIENT)
Dept: PULMONOLOGY | Facility: CLINIC | Age: 34
End: 2019-04-26
Attending: ALLERGY & IMMUNOLOGY
Payer: COMMERCIAL

## 2019-04-26 DIAGNOSIS — Z51.6 NEED FOR DESENSITIZATION TO ALLERGENS: Primary | ICD-10-CM

## 2019-04-26 PROCEDURE — 95117 IMMUNOTHERAPY INJECTIONS: CPT | Mod: ZF

## 2019-04-26 NOTE — NURSING NOTE
Chief Complaint   Patient presents with     RECHECK     Allergy Injections     Patient instructed to wait in building for at least 30 min. after injections  Patient came in for weekly Allergy injection. Patient had no reaction to last weeks shots. I gave 0.4 ml of the yellow bottle x2 sub cutaneous without any issues.   Juarez Jauregui comes into clinic today at the request of Vitaly Messina for allergy injections    No reaction to last injection.    This service provided today was under the direct supervision of Dr. Ojeda, who was available if needed.    Nelly Martínez, CMA

## 2019-04-30 ENCOUNTER — ALLIED HEALTH/NURSE VISIT (OUTPATIENT)
Dept: PULMONOLOGY | Facility: CLINIC | Age: 34
End: 2019-04-30
Attending: ALLERGY & IMMUNOLOGY
Payer: COMMERCIAL

## 2019-04-30 DIAGNOSIS — Z51.6 NEED FOR DESENSITIZATION TO ALLERGENS: Primary | ICD-10-CM

## 2019-04-30 PROCEDURE — 95117 IMMUNOTHERAPY INJECTIONS: CPT | Mod: ZF

## 2019-04-30 NOTE — NURSING NOTE
Chief Complaint   Patient presents with     RECHECK     Allergy Injection    Patient instructed to wait in building for at least 30 min. after injections  Patient came in for weekly Allergy injection. Patient had no reaction to last weeks shots. I gave 0.5 ml of the yellow bottle x2 sub cutaneous without any issues.   Juarez Jauregui comes into clinic today at the request of Vitaly Messina for allergy injections    No reaction to last injection.    This service provided today was under the direct supervision of Dr. Lagos, who was available if needed.    Nelly Martínez, CMA

## 2019-05-06 ENCOUNTER — ALLIED HEALTH/NURSE VISIT (OUTPATIENT)
Dept: PULMONOLOGY | Facility: CLINIC | Age: 34
End: 2019-05-06
Attending: ALLERGY & IMMUNOLOGY
Payer: COMMERCIAL

## 2019-05-06 DIAGNOSIS — Z51.6 NEED FOR DESENSITIZATION TO ALLERGENS: Primary | ICD-10-CM

## 2019-05-06 PROCEDURE — 95115 IMMUNOTHERAPY ONE INJECTION: CPT | Mod: ZF

## 2019-05-06 NOTE — NURSING NOTE
Chief Complaint   Patient presents with     Allied Health Visit     Allergy injection      Patient came in for weekly Allergy injection. Patient had no reaction to last weeks shots. I gave 0.05 ml of the red bottle sub X 2 cutaneous without any issues.   Patient instructed to wait in building for at least 30 min. after injections  Patients comes into clinic today at the request of Vitaly Messina for allergy injections    No reaction to last injection.    This service provided today was under the direct supervision of Dr. Vitaly Messina , who was available if needed.    Dorota Allen, CMA

## 2019-05-09 ENCOUNTER — ALLIED HEALTH/NURSE VISIT (OUTPATIENT)
Dept: PULMONOLOGY | Facility: CLINIC | Age: 34
End: 2019-05-09
Attending: ALLERGY & IMMUNOLOGY
Payer: COMMERCIAL

## 2019-05-09 DIAGNOSIS — Z51.6 NEED FOR DESENSITIZATION TO ALLERGENS: Primary | ICD-10-CM

## 2019-05-09 PROCEDURE — 95117 IMMUNOTHERAPY INJECTIONS: CPT | Mod: ZF

## 2019-05-09 NOTE — NURSING NOTE
Chief Complaint   Patient presents with     Allergy Injection     allergy injection     Patient came in for weekly Allergy injection. Patient had no reaction to last weeks shots. I gave 0.1 ml of the red bottle x2 sub cutaneous without any issues.   Patient instructed to wait in building for at least 30 min. after injections  Juarez Jauregui comes into clinic today at the request of Vitaly Messina for allergy injections    No reaction to last injection.    This service provided today was under the direct supervision of Dr. Cross, who was available if needed.        Joanna Cruz, SMA     Render Post-Care Instructions In Note?: no Number Of Freeze-Thaw Cycles: 2 freeze-thaw cycles Detail Level: Zone Post-Care Instructions: I reviewed with the patient in detail post-care instructions. Patient is to wear sunprotection, and avoid picking at any of the treated lesions. Pt may apply Vaseline to crusted or scabbing areas. Consent: The patient's consent was obtained including but not limited to risks of crusting, scabbing, blistering, scarring, darker or lighter pigmentary change, recurrence, incomplete removal and infection. Duration Of Freeze Thaw-Cycle (Seconds): 4

## 2019-05-13 ENCOUNTER — ALLIED HEALTH/NURSE VISIT (OUTPATIENT)
Dept: PULMONOLOGY | Facility: CLINIC | Age: 34
End: 2019-05-13
Attending: ALLERGY & IMMUNOLOGY
Payer: COMMERCIAL

## 2019-05-13 DIAGNOSIS — Z51.6 NEED FOR DESENSITIZATION TO ALLERGENS: Primary | ICD-10-CM

## 2019-05-13 PROCEDURE — 95117 IMMUNOTHERAPY INJECTIONS: CPT | Mod: ZF

## 2019-05-13 NOTE — NURSING NOTE
Chief Complaint   Patient presents with     Allied Health Visit     Allergy Injection      Patient came in for weekly Allergy injection. Patient had no reaction to last weeks shots. I gave 0.2 ml of the red bottle sub X 2 cutaneous without any issues.   Patient instructed to wait in building for at least 30 min. after injections  Patients comes into clinic today at the request of Vitaly Messina for allergy injections    No reaction to last injection.    This service provided today was under the direct supervision of Dr. Vitaly Messina , who was available if needed.    Dorota Allen, CMA

## 2019-05-16 ENCOUNTER — ALLIED HEALTH/NURSE VISIT (OUTPATIENT)
Dept: PULMONOLOGY | Facility: CLINIC | Age: 34
End: 2019-05-16
Attending: ALLERGY & IMMUNOLOGY
Payer: COMMERCIAL

## 2019-05-16 DIAGNOSIS — Z51.6 NEED FOR DESENSITIZATION TO ALLERGENS: Primary | ICD-10-CM

## 2019-05-16 PROCEDURE — 95117 IMMUNOTHERAPY INJECTIONS: CPT | Mod: ZF

## 2019-05-16 NOTE — NURSING NOTE
Chief Complaint   Patient presents with     Allergy Injection     Allergy injection     Patient came in for weekly Allergy injection. Patient had no reaction to last weeks shots. I gave 0.3 ml of the red bottle x3 sub cutaneous without any issues.   Patient instructed to wait in building for at least 30 min. after injections  Juarez Jauregui comes into clinic today at the request of Vitaly Messian for allergy injections    No reaction to last injection.    This service provided today was under the direct supervision of Dr. Messina, who was available if needed.        Joanna Cruz, SMA

## 2019-05-21 ENCOUNTER — ALLIED HEALTH/NURSE VISIT (OUTPATIENT)
Dept: PULMONOLOGY | Facility: CLINIC | Age: 34
End: 2019-05-21
Payer: COMMERCIAL

## 2019-05-21 DIAGNOSIS — Z51.6 NEED FOR DESENSITIZATION TO ALLERGENS: Primary | ICD-10-CM

## 2019-05-21 PROCEDURE — 95117 IMMUNOTHERAPY INJECTIONS: CPT | Mod: ZF

## 2019-05-21 NOTE — NURSING NOTE
Chief Complaint   Patient presents with     Allergy Injection     allergy injection     Patient came in for weekly Allergy injection. Patient had no reaction to last weeks shots. I gave 0.3 ml of the red bottle x2 sub cutaneous without any issues.   Patient instructed to wait in building for at least 30 min. after injections  Juarez Jauregui comes into clinic today at the request of Vitaly Messina for allergy injections    No reaction to last injection.    This service provided today was under the direct supervision of Dr. Messina, who was available if needed.        Joanna Cruz, SMA

## 2019-05-24 ENCOUNTER — ALLIED HEALTH/NURSE VISIT (OUTPATIENT)
Dept: PULMONOLOGY | Facility: CLINIC | Age: 34
End: 2019-05-24
Payer: COMMERCIAL

## 2019-05-24 DIAGNOSIS — Z51.6 NEED FOR DESENSITIZATION TO ALLERGENS: Primary | ICD-10-CM

## 2019-05-24 PROCEDURE — 95117 IMMUNOTHERAPY INJECTIONS: CPT | Mod: ZF

## 2019-05-24 NOTE — NURSING NOTE
Chief Complaint   Patient presents with     Allied Health Visit     Allergy Injection     Patient came in for weekly Allergy injection. Patient had no reaction to last weeks shots. I gave 0.4 ml of the red bottle sub X 2 cutaneous without any issues.   Patient instructed to wait in building for at least 30 min. after injections  Patients comes into clinic today at the request of Vitaly Messina for allergy injections    No reaction to last injection.    This service provided today was under the direct supervision of Dr. Radha Allen,, who was available if needed.    Dorota Allen, CMA

## 2019-05-28 ENCOUNTER — ALLIED HEALTH/NURSE VISIT (OUTPATIENT)
Dept: PULMONOLOGY | Facility: CLINIC | Age: 34
End: 2019-05-28
Attending: ALLERGY & IMMUNOLOGY
Payer: COMMERCIAL

## 2019-05-28 DIAGNOSIS — Z51.6 NEED FOR DESENSITIZATION TO ALLERGENS: Primary | ICD-10-CM

## 2019-05-28 PROCEDURE — 95117 IMMUNOTHERAPY INJECTIONS: CPT | Mod: ZF

## 2019-05-28 NOTE — NURSING NOTE
Patient came in for weekly Allergy injection. Patient had no reaction to last weeks shots. I gave 0.5 ml of the red bottle x 2 sub cutaneous without any issues.    Patient instructed to wait in building for at least 30 min. after injections   Juarez Jauregui comes into clinic today at the request of Vitaly Messina for allergy injections    No reaction to last injection.    This service provided today was under the direct supervision of Dr. Messina, who was available if needed.        Joanna Cruz, SMA

## 2019-06-11 ENCOUNTER — ALLIED HEALTH/NURSE VISIT (OUTPATIENT)
Dept: PULMONOLOGY | Facility: CLINIC | Age: 34
End: 2019-06-11
Payer: COMMERCIAL

## 2019-06-11 DIAGNOSIS — Z51.6 NEED FOR DESENSITIZATION TO ALLERGENS: Primary | ICD-10-CM

## 2019-06-11 PROCEDURE — 95117 IMMUNOTHERAPY INJECTIONS: CPT | Mod: ZF

## 2019-06-11 NOTE — NURSING NOTE
Patient instructed to wait in building for at least 30 min. after injections.  Patient came in for weekly Allergy injection. Patient had no reaction to last weeks shots. I gave 0.5 ml of the red bottle  X 2 subcutaneous without any issues.   Patient came into clinic today at the request of Vitaly Messina for allergy injections    No reaction to last injection.    This service provided today was under the direct supervision of Dr Rodríguez. , who was available if needed.    ERVIN Crooks

## 2019-06-26 DIAGNOSIS — F41.9 ANXIETY: ICD-10-CM

## 2019-06-26 RX ORDER — LORAZEPAM 1 MG/1
1 TABLET ORAL EVERY 8 HOURS PRN
Qty: 20 TABLET | Refills: 0
Start: 2019-06-26 | End: 2023-12-13

## 2019-06-26 NOTE — TELEPHONE ENCOUNTER
Last seen 4/24/19    Last filled for # 20 on 1/25/19    On time for refill at prn dosing    To MD to tanika Ny RN  June 26, 2019 4:53 PM

## 2019-06-27 NOTE — TELEPHONE ENCOUNTER
Script for lorazepam phoned to [facility].  Rylee Darby RN  [facility]

## 2019-06-28 ENCOUNTER — MYC MEDICAL ADVICE (OUTPATIENT)
Dept: FAMILY MEDICINE | Facility: CLINIC | Age: 34
End: 2019-06-28

## 2019-07-05 ENCOUNTER — OFFICE VISIT (OUTPATIENT)
Dept: FAMILY MEDICINE | Facility: CLINIC | Age: 34
End: 2019-07-05
Payer: COMMERCIAL

## 2019-07-05 VITALS
BODY MASS INDEX: 24.47 KG/M2 | OXYGEN SATURATION: 96 % | HEART RATE: 71 BPM | HEIGHT: 71 IN | SYSTOLIC BLOOD PRESSURE: 125 MMHG | WEIGHT: 174.75 LBS | TEMPERATURE: 98.1 F | DIASTOLIC BLOOD PRESSURE: 81 MMHG

## 2019-07-05 DIAGNOSIS — G47.09 OTHER INSOMNIA: ICD-10-CM

## 2019-07-05 DIAGNOSIS — F41.9 ANXIETY: Primary | ICD-10-CM

## 2019-07-05 RX ORDER — BUSPIRONE HYDROCHLORIDE 15 MG/1
TABLET ORAL
Qty: 60 TABLET | Refills: 0 | Status: SHIPPED | OUTPATIENT
Start: 2019-07-05 | End: 2019-07-30

## 2019-07-05 RX ORDER — TRAZODONE HYDROCHLORIDE 50 MG/1
TABLET, FILM COATED ORAL
Qty: 30 TABLET | Refills: 1 | Status: SHIPPED | OUTPATIENT
Start: 2019-07-05 | End: 2019-08-30

## 2019-07-05 ASSESSMENT — MIFFLIN-ST. JEOR: SCORE: 1752.66

## 2019-07-05 NOTE — PATIENT INSTRUCTIONS
July 30th Tuesday  11:20 am appt with Pingel    Buspirone 15mg dose  Instructions say take 1 twice daily    Pill cutter  Use 5mg dose twice daily x 2 days then  May increase to   5mg in the morning, 10mg later in the day  X 2 days   Then  10mg twice daily  X 2 days  Then  10mg in the morning, 15 mg in the evening  X 2 days  Then   15 mg twice daily

## 2019-07-05 NOTE — NURSING NOTE
"34 year old  Chief Complaint   Patient presents with     Recheck Medication     possible changing medication        Blood pressure 125/81, pulse 71, temperature 98.1  F (36.7  C), temperature source Oral, height 1.8 m (5' 10.87\"), weight 79.3 kg (174 lb 12 oz), SpO2 96 %. Body mass index is 24.46 kg/m .  Patient Active Problem List   Diagnosis     Strain of neck muscle, initial encounter     Acute recurrent maxillary sinusitis     Anxiety     History of migraine     Chronic seasonal allergic rhinitis     Neck pain     Upper back pain     Cervicalgia       Wt Readings from Last 2 Encounters:   07/05/19 79.3 kg (174 lb 12 oz)   04/24/19 78.6 kg (173 lb 4 oz)     BP Readings from Last 3 Encounters:   07/05/19 125/81   04/24/19 125/89   03/08/19 120/85         Current Outpatient Medications   Medication     Cholecalciferol (VITAMIN D PO)     creatine 400 MG capsule     Fish Oil-Cholecalciferol (FISH OIL + D3 PO)     fluticasone (FLONASE) 50 MCG/ACT spray     LORazepam (ATIVAN) 1 MG tablet     ORDER FOR ALLERGEN IMMUNOTHERAPY     ORDER FOR ALLERGEN IMMUNOTHERAPY     study - emtricitabine-tenofovir 200-300, IDS#[id number], (TRUVADA) per tablet     acetaminophen-codeine (TYLENOL #3) 300-30 MG tablet     HYDROcodone-acetaminophen (NORCO) 5-325 MG tablet     oxyCODONE-acetaminophen (PERCOCET) 5-325 MG tablet     Current Facility-Administered Medications   Medication     hydrocortisone (CORTAID) 1 % cream       Social History     Tobacco Use     Smoking status: Never Smoker     Smokeless tobacco: Never Used   Substance Use Topics     Alcohol use: Yes     Comment: social     Drug use: No       Health Maintenance Due   Topic Date Due     HIV SCREENING  06/20/2000     PREVENTIVE CARE VISIT  03/20/2019       No results found for: PAP      July 5, 2019 4:30 PM  "
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
## 2019-07-08 ASSESSMENT — ANXIETY QUESTIONNAIRES
IF YOU CHECKED OFF ANY PROBLEMS ON THIS QUESTIONNAIRE, HOW DIFFICULT HAVE THESE PROBLEMS MADE IT FOR YOU TO DO YOUR WORK, TAKE CARE OF THINGS AT HOME, OR GET ALONG WITH OTHER PEOPLE: EXTREMELY DIFFICULT
2. NOT BEING ABLE TO STOP OR CONTROL WORRYING: NEARLY EVERY DAY
1. FEELING NERVOUS, ANXIOUS, OR ON EDGE: NEARLY EVERY DAY
6. BECOMING EASILY ANNOYED OR IRRITABLE: SEVERAL DAYS
5. BEING SO RESTLESS THAT IT IS HARD TO SIT STILL: SEVERAL DAYS
GAD7 TOTAL SCORE: 17
7. FEELING AFRAID AS IF SOMETHING AWFUL MIGHT HAPPEN: NEARLY EVERY DAY
3. WORRYING TOO MUCH ABOUT DIFFERENT THINGS: NEARLY EVERY DAY

## 2019-07-08 ASSESSMENT — PATIENT HEALTH QUESTIONNAIRE - PHQ9
5. POOR APPETITE OR OVEREATING: NEARLY EVERY DAY
SUM OF ALL RESPONSES TO PHQ QUESTIONS 1-9: 6

## 2019-07-09 ENCOUNTER — ALLIED HEALTH/NURSE VISIT (OUTPATIENT)
Dept: PULMONOLOGY | Facility: CLINIC | Age: 34
End: 2019-07-09
Attending: ALLERGY & IMMUNOLOGY
Payer: COMMERCIAL

## 2019-07-09 ENCOUNTER — MYC MEDICAL ADVICE (OUTPATIENT)
Dept: FAMILY MEDICINE | Facility: CLINIC | Age: 34
End: 2019-07-09

## 2019-07-09 DIAGNOSIS — Z51.6 NEED FOR DESENSITIZATION TO ALLERGENS: Primary | ICD-10-CM

## 2019-07-09 PROCEDURE — 95117 IMMUNOTHERAPY INJECTIONS: CPT | Mod: ZF

## 2019-07-09 ASSESSMENT — ANXIETY QUESTIONNAIRES: GAD7 TOTAL SCORE: 17

## 2019-07-09 NOTE — NURSING NOTE
Chief Complaint   Patient presents with     Allied Health Visit     Allergy injection      Patient came in for monthly Allergy injection. Patient had no reaction to last weeks shots. I gave 0.5 ml of the red bottle sub X 2 cutaneous without any issues.   Patient instructed to wait in building for at least 30 min. after injections  Patients comes into clinic today at the request of Vitaly Messina for allergy injections    No reaction to last injection.    This service provided today was under the direct supervision of EWA Estevez, who was available if needed.    Dorota Allen, CMA

## 2019-07-30 DIAGNOSIS — F41.9 ANXIETY: ICD-10-CM

## 2019-07-30 RX ORDER — BUSPIRONE HYDROCHLORIDE 15 MG/1
TABLET ORAL
Qty: 60 TABLET | Refills: 0 | Status: SHIPPED | OUTPATIENT
Start: 2019-07-30 | End: 2019-11-11

## 2019-07-30 NOTE — TELEPHONE ENCOUNTER
Last time prescribed: 7/5/19 , 60 tabs/caps x 0 refills  Last office visit: 7/5/19  Next appointment: 8/30/19     Prescription approved per [facility] Refill Protocol.       Per visit note of 7/5/19, said appt was July 30th but actually is for 8/30/19, same time 11:20 am   Refilling for one more month on this  new medication to get to appt    Janelle Ny RN  July 30, 2019 1:48 PM

## 2019-08-05 ENCOUNTER — ALLIED HEALTH/NURSE VISIT (OUTPATIENT)
Dept: ALLERGY | Facility: CLINIC | Age: 34
End: 2019-08-05
Attending: ALLERGY & IMMUNOLOGY
Payer: COMMERCIAL

## 2019-08-05 DIAGNOSIS — Z51.6 NEED FOR DESENSITIZATION TO ALLERGENS: Primary | ICD-10-CM

## 2019-08-05 NOTE — PROGRESS NOTES
Juarez Jauregui comes into clinic today at the request of  Ordering Provider for allergy shot      Patient advised to stay for 30 minutes after injection. Patient advised to inform nursing staff of any reactions.     This service provided today was under the supervising provider of the day , who was available if needed.    Radha Barajas RN

## 2019-08-30 DIAGNOSIS — G47.09 OTHER INSOMNIA: ICD-10-CM

## 2019-08-30 RX ORDER — TRAZODONE HYDROCHLORIDE 50 MG/1
TABLET, FILM COATED ORAL
Qty: 30 TABLET | Refills: 0 | Status: SHIPPED | OUTPATIENT
Start: 2019-08-30 | End: 2020-01-24

## 2019-08-30 NOTE — TELEPHONE ENCOUNTER
Prescription approved per [facility] Refill Protocol.  Chana Gaytan RN  08/30/19  2:36 PM

## 2019-08-30 NOTE — TELEPHONE ENCOUNTER
Last time prescribed: 7/5/19 , 30 tabs x 1 refills  Last office visit: 7/5/19  Next appointment: No future appointments

## 2019-09-06 ENCOUNTER — ALLIED HEALTH/NURSE VISIT (OUTPATIENT)
Dept: ALLERGY | Facility: CLINIC | Age: 34
End: 2019-09-06
Payer: COMMERCIAL

## 2019-09-06 DIAGNOSIS — Z51.6 NEED FOR DESENSITIZATION TO ALLERGENS: Primary | ICD-10-CM

## 2019-09-06 NOTE — PROGRESS NOTES
Patient came in for 0.5 Allergy injection. Patient had no reaction to last shots. I gave 0.5 ml of the Red bottle subcutaneous without any issues.   Patient instructed to wait in building for at least 30 min. after injections  Patients comes into clinic today at the request of Vitaly Messina for allergy injections     No reaction to last injection.     This service provided today was under the direct supervision of Noble , who was available if needed.     Jenelle Bustamante, CMA

## 2019-10-04 ENCOUNTER — ALLIED HEALTH/NURSE VISIT (OUTPATIENT)
Dept: ALLERGY | Facility: CLINIC | Age: 34
End: 2019-10-04
Payer: COMMERCIAL

## 2019-10-04 DIAGNOSIS — Z51.6 NEED FOR DESENSITIZATION TO ALLERGENS: Primary | ICD-10-CM

## 2019-10-04 NOTE — PROGRESS NOTES
Patient came in for monthly Allergy injection. Patient had no reaction to last shots. I gave 0.5 ml of the red bottle subcutaneous without any issues.   Patient instructed to wait in building for at least 30 min. after injections  Patients comes into clinic today at the request of Vitaly Messina for allergy injections     No reaction to last injection.     This service provided today was under the direct supervision of  , who was available if needed.     Savanna Longoria LPN

## 2019-10-04 NOTE — PROGRESS NOTES
Patient was observed for 30 minutes and left in stable condition. Patient applied ice packs to injection sites.

## 2019-11-05 ENCOUNTER — ALLIED HEALTH/NURSE VISIT (OUTPATIENT)
Dept: ALLERGY | Facility: CLINIC | Age: 34
End: 2019-11-05
Payer: COMMERCIAL

## 2019-11-05 DIAGNOSIS — Z51.6 NEED FOR DESENSITIZATION TO ALLERGENS: Primary | ICD-10-CM

## 2019-11-05 NOTE — NURSING NOTE
Patient came in for *** Allergy injection. Patient had no reaction to last shots. I gave 0.5 ml of the red bottle (M) and 0.5 ml of the Red bottle (C, R, G, W) subcutaneous without any issues.   Patient instructed to wait in building for at least 30 min. after injections  Patients comes into clinic today at the request of Vitaly Messina for allergy injections     No reaction to last injection.     This service provided today was under the direct supervision of Dr. Dickey , who was available if needed.     MICHELE HOLDEN LPN

## 2019-11-05 NOTE — PROGRESS NOTES
Patient came in for 2 Allergy injection. Patient had no reaction to last shots. I gave 0.5 ml ml of the Red bottle (M)  And 0.5 ml of Red bottle(C, T, G W) subcutaneous without any issues.   Patient instructed to wait in building for at least 30 min. after injections  Patients comes into clinic today at the request of Vitaly Messina for allergy injections     No reaction to last injection.     This service provided today was under the direct supervision of Dr. Sanford , who was available if needed.     MICHELE HOLDEN LPN

## 2019-11-08 ENCOUNTER — TELEPHONE (OUTPATIENT)
Dept: FAMILY MEDICINE | Facility: CLINIC | Age: 34
End: 2019-11-08

## 2019-11-08 ENCOUNTER — NURSE TRIAGE (OUTPATIENT)
Dept: FAMILY MEDICINE | Facility: CLINIC | Age: 34
End: 2019-11-08

## 2019-11-08 NOTE — TELEPHONE ENCOUNTER
"  Reason for Disposition    [1] Abnormal color is unexplained AND [2] persists > 24 hours    Answer Assessment - Initial Assessment Questions  COLOR: \"beige, not quite white and not gray\"  ONSET: 10 days - consistently with every stool  CAUSE: No changes in food or medications  OTHER SYMPTOMS: intermittent constipation with loose stools; bilateral flank pain that comes and goes, lasts about 30 minutes, and pain is uncomfortable and rated about 5/10.     Denies jaundice, abdominal pain, fever    Protocols used: STOOLS - UNUSUAL COLOR-A-    [place]. Reviewed stat symptoms that require urgent medical attention. Patient prefers appointment on Monday with PCP. Patient verbalizes understanding and agrees with the plan. He is scheduled for an appointment on Monday with PCP.     Chana Gaytan RN  11/08/19  4:21 PM    "

## 2019-11-08 NOTE — TELEPHONE ENCOUNTER
Called and LVM - if patient would like to speak to a nurse about symptoms and if you need to be seen, please call back and let the call center know you missed a call from nurse. OK to transfer this call to clinic.     Chana Gaytan RN  11/08/19  3:59 PM

## 2019-11-08 NOTE — TELEPHONE ENCOUNTER
[facility] Call Center    Phone Message    May a detailed message be left on voicemail: yes    Reason for Call: Other: Pt returned call, please call him back     Action Taken: Message routed to:  [facility]: [facility] Nurses

## 2019-11-11 ENCOUNTER — OFFICE VISIT (OUTPATIENT)
Dept: FAMILY MEDICINE | Facility: CLINIC | Age: 34
End: 2019-11-11
Payer: COMMERCIAL

## 2019-11-11 VITALS
BODY MASS INDEX: 24.37 KG/M2 | HEIGHT: 70 IN | OXYGEN SATURATION: 96 % | SYSTOLIC BLOOD PRESSURE: 112 MMHG | TEMPERATURE: 98.1 F | HEART RATE: 74 BPM | DIASTOLIC BLOOD PRESSURE: 75 MMHG | WEIGHT: 170.25 LBS

## 2019-11-11 DIAGNOSIS — R10.12 ABDOMINAL PAIN, LEFT UPPER QUADRANT: ICD-10-CM

## 2019-11-11 DIAGNOSIS — R19.5 CHANGE IN STOOL: Primary | ICD-10-CM

## 2019-11-11 LAB
ALBUMIN SERPL-MCNC: 3.7 G/DL (ref 3.3–4.6)
ALP SERPL-CCNC: 93 U/L (ref 40–150)
ALT SERPL-CCNC: 34 U/L (ref 0–70)
AST SERPL-CCNC: 28 U/L (ref 0–55)
BILIRUB SERPL-MCNC: 0.6 MG/DL (ref 0.2–1.3)
BUN SERPL-MCNC: 12 MG/DL (ref 5–24)
CALCIUM SERPL-MCNC: 9 MG/DL (ref 8.5–10.4)
CHLORIDE SERPLBLD-SCNC: 103 MMOL/L (ref 94–109)
CO2 SERPL-SCNC: 28 MMOL/L (ref 20–32)
CREAT SERPL-MCNC: 1 MG/DL (ref 0.8–1.5)
EGFR CALCULATED (BLACK REFERENCE): 110
EGFR CALCULATED (NON BLACK REFERENCE): 90.9
ERYTHROCYTE [DISTWIDTH] IN BLOOD BY AUTOMATED COUNT: 14.1 %
GLUCOSE SERPL-MCNC: 85 MG/DL (ref 60–99)
HCT VFR BLD AUTO: 46.8 % (ref 40–53)
HEMOGLOBIN: 15.4 G/DL (ref 13.3–17.7)
MCH RBC QN AUTO: 30.7 PG (ref 26.5–35)
MCHC RBC AUTO-ENTMCNC: 32.9 G/DL (ref 32–36)
MCV RBC AUTO: 93.2 FL (ref 78–100)
PLATELET # BLD AUTO: 406 K/UL (ref 150–450)
POTASSIUM SERPL-SCNC: 4.1 MMOL/L (ref 3.4–5.3)
PROT SERPL-MCNC: 7.4 G/DL (ref 6.8–8.8)
RBC # BLD AUTO: 5.02 M/UL (ref 4.4–5.9)
SODIUM SERPL-SCNC: 140 MMOL/L (ref 137.3–146.3)
WBC # BLD AUTO: 10.4 K/UL (ref 4–11)

## 2019-11-11 RX ORDER — DULOXETIN HYDROCHLORIDE 30 MG/1
CAPSULE, DELAYED RELEASE ORAL
COMMUNITY
Start: 2019-11-11 | End: 2020-01-24

## 2019-11-11 RX ORDER — CYCLOBENZAPRINE HCL 10 MG
TABLET ORAL
Refills: 0 | COMMUNITY
Start: 2019-02-27 | End: 2019-11-11

## 2019-11-11 RX ORDER — PRAZOSIN HYDROCHLORIDE 1 MG/1
1 CAPSULE ORAL
COMMUNITY
Start: 2019-10-08 | End: 2020-05-20

## 2019-11-11 RX ORDER — DULOXETIN HYDROCHLORIDE 60 MG/1
CAPSULE, DELAYED RELEASE ORAL
COMMUNITY
Start: 2019-11-11 | End: 2020-01-24

## 2019-11-11 ASSESSMENT — MIFFLIN-ST. JEOR: SCORE: 1725.99

## 2019-11-11 NOTE — NURSING NOTE
"34 year old  Chief Complaint   Patient presents with     Gastrointestinal Problem     abnormal color in beige/ yellow in color loose and watery x 2 wks        Blood pressure 112/75, pulse 74, temperature 98.1  F (36.7  C), temperature source Oral, height 1.79 m (5' 10.47\"), weight 77.2 kg (170 lb 4 oz), SpO2 96 %. Body mass index is 24.1 kg/m .  Patient Active Problem List   Diagnosis     Strain of neck muscle, initial encounter     Acute recurrent maxillary sinusitis     Anxiety     History of migraine     Chronic seasonal allergic rhinitis     Neck pain     Upper back pain     Cervicalgia       Wt Readings from Last 2 Encounters:   11/11/19 77.2 kg (170 lb 4 oz)   07/05/19 79.3 kg (174 lb 12 oz)     BP Readings from Last 3 Encounters:   11/11/19 112/75   07/05/19 125/81   04/24/19 125/89         Current Outpatient Medications   Medication     Cholecalciferol (VITAMIN D PO)     creatine 400 MG capsule     cyclobenzaprine (FLEXERIL) 10 MG tablet     DULoxetine (CYMBALTA) 30 MG capsule     DULoxetine (CYMBALTA) 60 MG capsule     Fish Oil-Cholecalciferol (FISH OIL + D3 PO)     LORazepam (ATIVAN) 1 MG tablet     ORDER FOR ALLERGEN IMMUNOTHERAPY     ORDER FOR ALLERGEN IMMUNOTHERAPY     prazosin (MINIPRESS) 1 MG capsule     study - emtricitabine-tenofovir 200-300, IDS#[id number], (TRUVADA) per tablet     traZODone (DESYREL) 50 MG tablet     Current Facility-Administered Medications   Medication     hydrocortisone (CORTAID) 1 % cream       Social History     Tobacco Use     Smoking status: Never Smoker     Smokeless tobacco: Never Used   Substance Use Topics     Alcohol use: Yes     Comment: social     Drug use: No       Health Maintenance Due   Topic Date Due     HIV SCREENING  06/20/2000     PREVENTIVE CARE VISIT  03/20/2019       No results found for: PAP      November 11, 2019 4:22 PM    "

## 2019-11-11 NOTE — PROGRESS NOTES
Juarez Jauregui is a 34 year old male here for the following issues:    Abnormal stool  Juarez is a 34 year old male who is typically healthy. He reports onset of alternating constipation and diarrhea, as well as light yellow stool for the past 2 wks.  No associated fevers. No black or bloody stools. Stools are loose/watery up to 6x per day. He is not taking any medication for his symptoms. Reports associated nausea and low back pain that will cause him to wake up in the middle of the night. Denies early satiety, no burning in his chest, no dysphagia. No pain after eating. Denies significant abdominal pain, only bloating and cramping with BM.      Of note is a 10 pound weight loss from March to April of 2019 (he reports he stopped going to the gym), weight is down 3 pounds from April 2019.     He takes a creatine powder and mixes it into a protein shake once daily. Has been on this for more than a year. Recent addition of prazosin for nightmares. He reports some intermittent night sweats.     Patient Active Problem List   Diagnosis     Strain of neck muscle, initial encounter     Acute recurrent maxillary sinusitis     Anxiety     History of migraine     Chronic seasonal allergic rhinitis     Neck pain     Upper back pain     Cervicalgia       Current Outpatient Medications   Medication Sig Dispense Refill     Cholecalciferol (VITAMIN D PO)        creatine 400 MG capsule Take 3 po BID 90 capsule 3     cyclobenzaprine (FLEXERIL) 10 MG tablet TAKE 1 TABLET BY MOUTH DAILY AT BEDTIME  0     DULoxetine (CYMBALTA) 30 MG capsule Take 30 mg cap + 60 mg cap for a total of 90 mg daily       DULoxetine (CYMBALTA) 60 MG capsule Take 60 mg cap + 30 mg cap for a total of 90 mg daily       Fish Oil-Cholecalciferol (FISH OIL + D3 PO)        LORazepam (ATIVAN) 1 MG tablet Take 1 tablet (1 mg) by mouth every 8 hours as needed for anxiety 20 tablet 0     ORDER FOR ALLERGEN IMMUNOTHERAPY Name of Mix: Mix #1  Cat, Grass, Ragweed, Tree  "  Cat Hair, Standardized A.P. 10,000 BAU/mL, HS  0.5 ml   Birch Mix PRW 1:20 w/v, HS  0.3 ml  Cedar, Red 1:20 w/v, HS  0.3 ml  Toño (Std) 100,000 BAU/mL, HS 0.3 ml  Ragweed, Mix (Giant, Short) 1:20 w/v, HS 0.3 ml  Diluent: HSA qs to 5ml  Red, yellow, blue, green 5 mL PRN     ORDER FOR ALLERGEN IMMUNOTHERAPY Name of Mix: Mix #2  Mold  Alternaria 10,000 pnu/mL (1:20 w/v), ALK  0.2 ml  Diluent: HSA qs to 5ml   Red, yellow, blue, green 5 mL 1     prazosin (MINIPRESS) 1 MG capsule Take 1 mg by mouth       study - emtricitabine-tenofovir 200-300, IDS#[id number], (TRUVADA) per tablet Take 1 tablet by mouth daily 30 tablet      traZODone (DESYREL) 50 MG tablet Take 0.5 -1 po q hs 30 tablet 0       Allergies   Allergen Reactions     Neosporin Af [Miconazole]      Adhesive Tape Rash     Topical creams: neosporin and bacitracin     Bacitracin Rash     Bacitracin-Polymyxin B Rash and GI Disturbance     No Clinical Screening - See Comments Rash     Topical creams: neosporin and bacitracin     Triple Antibiotic Rash        EXAM  /75 (BP Location: Right arm, Patient Position: Sitting, Cuff Size: Adult Large)   Pulse 74   Temp 98.1  F (36.7  C) (Oral)   Ht 1.79 m (5' 10.47\")   Wt 77.2 kg (170 lb 4 oz)   SpO2 96%   BMI 24.10 kg/m    Gen: Alert, pleasant, NAD  HEENT:  Conjunctiva nl, TM normal bilaterally, OP clear, no posterior erythema  COR: S1,S2, no murmur  Lungs: CTA bilaterally, no rhonchi, wheezes or rales  Abdomen: soft, active BS, exquisite tenderness with palpation over midepigastrium and LUQ. RUQ unremarkable. No palpable HSM or masses.   Back: point tenderness with palpation over SI joints bilaterally, L>R and mild tenderness over left flank with palpation.   Skin : no jaundice      Assessment:  (R19.5) Change in stool  (primary encounter diagnosis)  Comment: change in bowel habits and color of stool for the past 2 wks, weight loss, nausea  Plan: Comprehensive Metabolic Panel ([facility]), CBC         with Plt " ([facility]), US abdomen complete        Obtain labs and abdominal US in the next day or so    (R10.12) Abdominal pain, left upper quadrant  Comment: tenderness on exam at the LUQ which surprised patient, he did not perceive any abdominal pain prior to my exam  Plan: US abdomen complete        Await lab studies and imaging.    DDX for upper abdominal pain is GERD, pancreatic, GB, liver issue.    He has appt for ultrasound on 11/12/19 6:30 am    Danielle Mitchell MD  Internal Medicine/Pediatrics        I, Fede Moreno, am serving as a scribe to document services personally performed by Dr. Danielle Mitchell, based on data collection and the provider's statements to me. Dr. Mitchell has reviewed, edited and approved the above note.

## 2019-11-12 ENCOUNTER — ANCILLARY PROCEDURE (OUTPATIENT)
Dept: ULTRASOUND IMAGING | Facility: CLINIC | Age: 34
End: 2019-11-12
Attending: INTERNAL MEDICINE
Payer: COMMERCIAL

## 2019-11-12 DIAGNOSIS — R10.12 ABDOMINAL PAIN, LEFT UPPER QUADRANT: ICD-10-CM

## 2019-11-12 DIAGNOSIS — K29.00 ACUTE GASTRITIS WITHOUT HEMORRHAGE, UNSPECIFIED GASTRITIS TYPE: Primary | ICD-10-CM

## 2019-11-12 DIAGNOSIS — R19.5 CHANGE IN STOOL: ICD-10-CM

## 2019-11-12 RX ORDER — OMEPRAZOLE 40 MG/1
40 CAPSULE, DELAYED RELEASE ORAL DAILY
Qty: 90 CAPSULE | Refills: 0 | Status: SHIPPED | OUTPATIENT
Start: 2019-11-12 | End: 2020-02-04

## 2019-11-26 ENCOUNTER — OFFICE VISIT (OUTPATIENT)
Dept: FAMILY MEDICINE | Facility: CLINIC | Age: 34
End: 2019-11-26
Payer: COMMERCIAL

## 2019-11-26 VITALS
TEMPERATURE: 98 F | SYSTOLIC BLOOD PRESSURE: 118 MMHG | BODY MASS INDEX: 24.91 KG/M2 | WEIGHT: 174 LBS | OXYGEN SATURATION: 97 % | HEART RATE: 83 BPM | HEIGHT: 70 IN | DIASTOLIC BLOOD PRESSURE: 80 MMHG

## 2019-11-26 DIAGNOSIS — N52.2 DRUG-INDUCED ERECTILE DYSFUNCTION: ICD-10-CM

## 2019-11-26 DIAGNOSIS — K29.00 ACUTE GASTRITIS WITHOUT HEMORRHAGE, UNSPECIFIED GASTRITIS TYPE: Primary | ICD-10-CM

## 2019-11-26 RX ORDER — SILDENAFIL 100 MG/1
100 TABLET, FILM COATED ORAL DAILY PRN
Qty: 6 TABLET | Refills: 3 | Status: SHIPPED | OUTPATIENT
Start: 2019-11-26 | End: 2022-04-04

## 2019-11-26 ASSESSMENT — MIFFLIN-ST. JEOR: SCORE: 1743

## 2019-11-26 NOTE — NURSING NOTE
"34 year old  Chief Complaint   Patient presents with     RECHECK     f/u 11/11/2019  US and on going abnormal colored stools.        Blood pressure 118/80, pulse 83, temperature 98  F (36.7  C), temperature source Oral, height 1.79 m (5' 10.47\"), weight 78.9 kg (174 lb), SpO2 97 %. Body mass index is 24.63 kg/m .  Patient Active Problem List   Diagnosis     Strain of neck muscle, initial encounter     Acute recurrent maxillary sinusitis     Anxiety     History of migraine     Chronic seasonal allergic rhinitis     Neck pain     Upper back pain     Cervicalgia       Wt Readings from Last 2 Encounters:   11/26/19 78.9 kg (174 lb)   11/11/19 77.2 kg (170 lb 4 oz)     BP Readings from Last 3 Encounters:   11/26/19 118/80   11/11/19 112/75   07/05/19 125/81         Current Outpatient Medications   Medication     Cholecalciferol (VITAMIN D PO)     Creatine Monohydrate POWD     DULoxetine (CYMBALTA) 30 MG capsule     DULoxetine (CYMBALTA) 60 MG capsule     Fish Oil-Cholecalciferol (FISH OIL + D3 PO)     LORazepam (ATIVAN) 1 MG tablet     omeprazole (PRILOSEC) 40 MG DR capsule     ORDER FOR ALLERGEN IMMUNOTHERAPY     ORDER FOR ALLERGEN IMMUNOTHERAPY     prazosin (MINIPRESS) 1 MG capsule     study - emtricitabine-tenofovir 200-300, IDS#[id number], (TRUVADA) per tablet     traZODone (DESYREL) 50 MG tablet     Current Facility-Administered Medications   Medication     hydrocortisone (CORTAID) 1 % cream       Social History     Tobacco Use     Smoking status: Never Smoker     Smokeless tobacco: Never Used   Substance Use Topics     Alcohol use: Yes     Comment: social     Drug use: No       Health Maintenance Due   Topic Date Due     HIV SCREENING  06/20/2000     PREVENTIVE CARE VISIT  03/20/2019       No results found for: PAP      November 26, 2019 9:39 AM    "

## 2019-11-26 NOTE — PROGRESS NOTES
"Juarez Jauregui is a 34 year old male here for the following issues:    Acute gastritis without hemorrhage, unspecified gastritis type   Juarez visited [facility] [place] on 11/11/2019 with a 2 week history of alternating constipation and diarrhea, along with light yellow stools.  He had LUQ abdominal pain on exam, and was referred to have a abdominal US.  US reviewed. The report is unremarkable but states he was sensitive to pressure at the LLQ. This was actually the LUQ per patient. I contacted him with suggestion to start omeprazole 40 mg daily.  Today, he reports he has been doing much better and is \"almost normal\".  His stools have returned to normal, and he noticed improvement 7-8 days after beginning omeprazole.  He takes his omeprazole every morning on an empty stomach.  Denies any current abdominal pain, dysphagia or loose stools.    Drug-induced erectile dysfunction  Juarez has been experiencing ED, which he attributes to his anxiety medications. Erections are spontaneous but are not sustained.  He is interested in a trial of Viagra. He has not used this in the past. He is on Prazosin for help with sleep. We discussed need not to take the two medications together.       Patient Active Problem List   Diagnosis     Strain of neck muscle, initial encounter     Acute recurrent maxillary sinusitis     Anxiety     History of migraine     Chronic seasonal allergic rhinitis     Neck pain     Upper back pain     Cervicalgia       Current Outpatient Medications   Medication Sig Dispense Refill     Cholecalciferol (VITAMIN D PO)        Creatine Monohydrate POWD 1 scoop daily into protein shake 454 g 11     DULoxetine (CYMBALTA) 30 MG capsule Take 30 mg cap + 60 mg cap for a total of 90 mg daily       DULoxetine (CYMBALTA) 60 MG capsule Take 60 mg cap + 30 mg cap for a total of 90 mg daily       Fish Oil-Cholecalciferol (FISH OIL + D3 PO)        LORazepam (ATIVAN) 1 MG tablet Take 1 tablet (1 mg) by mouth every 8 hours as needed for " "anxiety 20 tablet 0     omeprazole (PRILOSEC) 40 MG DR capsule Take 1 capsule (40 mg) by mouth daily 90 capsule 0     ORDER FOR ALLERGEN IMMUNOTHERAPY Name of Mix: Mix #1  Cat, Grass, Ragweed, Tree   Cat Hair, Standardized A.P. 10,000 BAU/mL, HS  0.5 ml   Birch Mix PRW 1:20 w/v, HS  0.3 ml  Cedar, Red 1:20 w/v, HS  0.3 ml  Toño (Std) 100,000 BAU/mL, HS 0.3 ml  Ragweed, Mix (Giant, Short) 1:20 w/v, HS 0.3 ml  Diluent: HSA qs to 5ml  Red, yellow, blue, green 5 mL PRN     ORDER FOR ALLERGEN IMMUNOTHERAPY Name of Mix: Mix #2  Mold  Alternaria 10,000 pnu/mL (1:20 w/v), ALK  0.2 ml  Diluent: HSA qs to 5ml   Red, yellow, blue, green 5 mL 1     prazosin (MINIPRESS) 1 MG capsule Take 1 mg by mouth       study - emtricitabine-tenofovir 200-300, IDS#[id number], (TRUVADA) per tablet Take 1 tablet by mouth daily 30 tablet      traZODone (DESYREL) 50 MG tablet Take 0.5 -1 po q hs 30 tablet 0       Allergies   Allergen Reactions     Neosporin Af [Miconazole]      Adhesive Tape Rash     Topical creams: neosporin and bacitracin     Bacitracin Rash     Bacitracin-Polymyxin B Rash and GI Disturbance     No Clinical Screening - See Comments Rash     Topical creams: neosporin and bacitracin     Triple Antibiotic Rash        EXAM  /80 (BP Location: Left arm, Patient Position: Sitting, Cuff Size: Adult Large)   Pulse 83   Temp 98  F (36.7  C) (Oral)   Ht 1.79 m (5' 10.47\")   Wt 78.9 kg (174 lb)   SpO2 97%   BMI 24.63 kg/m    Gen: Alert, pleasant, NAD  COR: S1,S2, no murmur  Lungs: CTA bilaterally, no rhonchi, wheezes or rales  Abdomen: Soft,  normal bowel sounds, no HSM or mass.  NO tenderness at the umbilical region or midepigastrium       Assessment:  (K29.00) Acute gastritis without hemorrhage, unspecified gastritis type  (primary encounter diagnosis)  Comment: doing very well on omeprazole  Plan: recommend he continue with 40mg daily x 8 wk, notify me for refractory symptoms. Follow up mid Jan 2020. If symptoms return off " medication, then refer for EGD. Discussed lifestyle modification in terms of Etoh, caffeine ingestion.     (N52.2) Drug-induced erectile dysfunction  Comment: some ED, likely related to medications taken for depression/anxiety  Plan: sildenafil (VIAGRA) 100 MG tablet        Discussed cutting tablet in quarters to start, may increase dose as needed. Do not take prazosin q hs if he has take the Viagra that day.    Return mid Jan 2020      Danielle Mitchell MD  Internal Medicine/Pediatrics      I, Jay Conteh, am serving as a scribe to document services personally performed by Dr. Danielle Mitchell, based on data collection and the provider's statements to me. Dr. Mitchell has reviewed, edited, and approved the above note.

## 2019-12-03 ENCOUNTER — ALLIED HEALTH/NURSE VISIT (OUTPATIENT)
Dept: ALLERGY | Facility: CLINIC | Age: 34
End: 2019-12-03
Payer: COMMERCIAL

## 2019-12-03 DIAGNOSIS — Z51.6 NEED FOR DESENSITIZATION TO ALLERGENS: Primary | ICD-10-CM

## 2019-12-03 NOTE — NURSING NOTE
Patient came in for 2 Allergy injection. Patient had no reaction to last shots. I gave 0.5 ml of the red bottle subcutaneous(C, T, G, W) and 0.5 ml of mak bottle (M) without any issues.   Patient instructed to wait in building for at least 30 min. after injections  Patients comes into clinic today at the request of Vitaly Messina for allergy injections     No reaction to last injection.     This service provided today was under the direct supervision of Dr. Dickey , who was available if needed.     MICHELE HOLDEN LPN

## 2019-12-16 ENCOUNTER — OFFICE VISIT (OUTPATIENT)
Dept: FAMILY MEDICINE | Facility: CLINIC | Age: 34
End: 2019-12-16
Payer: COMMERCIAL

## 2019-12-16 VITALS
DIASTOLIC BLOOD PRESSURE: 86 MMHG | BODY MASS INDEX: 24.62 KG/M2 | WEIGHT: 172 LBS | HEART RATE: 83 BPM | SYSTOLIC BLOOD PRESSURE: 134 MMHG | HEIGHT: 70 IN | TEMPERATURE: 98.1 F | OXYGEN SATURATION: 97 %

## 2019-12-16 DIAGNOSIS — K58.2 IRRITABLE BOWEL SYNDROME WITH BOTH CONSTIPATION AND DIARRHEA: Primary | ICD-10-CM

## 2019-12-16 RX ORDER — DICYCLOMINE HYDROCHLORIDE 10 MG/1
10 CAPSULE ORAL
COMMUNITY
Start: 2019-12-03 | End: 2021-08-20

## 2019-12-16 RX ORDER — CYCLOBENZAPRINE HCL 5 MG
5 TABLET ORAL
COMMUNITY
Start: 2019-12-14 | End: 2020-08-28

## 2019-12-16 ASSESSMENT — MIFFLIN-ST. JEOR: SCORE: 1733.93
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
## 2019-12-16 NOTE — NURSING NOTE
"34 year old  Chief Complaint   Patient presents with     Hospital F/U     F/U from [facility] discharge 12/12/2019 ? RUQ pain        Blood pressure 134/86, pulse 83, temperature 98.1  F (36.7  C), temperature source Oral, height 1.79 m (5' 10.47\"), weight 78 kg (172 lb), SpO2 97 %. Body mass index is 24.35 kg/m .  Patient Active Problem List   Diagnosis     Strain of neck muscle, initial encounter     Acute recurrent maxillary sinusitis     Anxiety     History of migraine     Chronic seasonal allergic rhinitis     Neck pain     Upper back pain     Cervicalgia       Wt Readings from Last 2 Encounters:   12/16/19 78 kg (172 lb)   11/26/19 78.9 kg (174 lb)     BP Readings from Last 3 Encounters:   12/16/19 134/86   11/26/19 118/80   11/11/19 112/75         Current Outpatient Medications   Medication     Cholecalciferol (VITAMIN D PO)     Creatine Monohydrate POWD     cyclobenzaprine (FLEXERIL) 5 MG tablet     dicyclomine (BENTYL) 10 MG capsule     DULoxetine (CYMBALTA) 30 MG capsule     DULoxetine (CYMBALTA) 60 MG capsule     Fish Oil-Cholecalciferol (FISH OIL + D3 PO)     LORazepam (ATIVAN) 1 MG tablet     omeprazole (PRILOSEC) 40 MG DR capsule     ORDER FOR ALLERGEN IMMUNOTHERAPY     ORDER FOR ALLERGEN IMMUNOTHERAPY     prazosin (MINIPRESS) 1 MG capsule     sildenafil (VIAGRA) 100 MG tablet     study - emtricitabine-tenofovir 200-300, IDS#[id number], (TRUVADA) per tablet     traZODone (DESYREL) 50 MG tablet     Current Facility-Administered Medications   Medication     hydrocortisone (CORTAID) 1 % cream       Social History     Tobacco Use     Smoking status: Never Smoker     Smokeless tobacco: Never Used   Substance Use Topics     Alcohol use: Yes     Comment: social     Drug use: No       Health Maintenance Due   Topic Date Due     HIV SCREENING  06/20/2000     PREVENTIVE CARE VISIT  03/20/2019       No results found for: PAP      December 16, 2019 4:53 PM    "

## 2020-01-08 ENCOUNTER — ALLIED HEALTH/NURSE VISIT (OUTPATIENT)
Dept: ALLERGY | Facility: CLINIC | Age: 35
End: 2020-01-08
Payer: COMMERCIAL

## 2020-01-08 DIAGNOSIS — Z51.6 NEED FOR DESENSITIZATION TO ALLERGENS: Primary | ICD-10-CM

## 2020-01-08 NOTE — NURSING NOTE
Patient came in for monthly Allergy injection. Patient had no reaction to last shots. I gave 0.5 ml of the red bottle subcutaneous without any issues.   Patient instructed to wait in building for at least 30 min. after injections  Patients comes into clinic today at the request of Vitaly Messina for allergy injections     No reaction to last injection.     This service provided today was under the direct supervision of  , who was available if needed.     Savanna Longoria LPN       
home

## 2020-01-24 ENCOUNTER — OFFICE VISIT (OUTPATIENT)
Dept: FAMILY MEDICINE | Facility: CLINIC | Age: 35
End: 2020-01-24
Payer: COMMERCIAL

## 2020-01-24 VITALS
BODY MASS INDEX: 26.27 KG/M2 | HEART RATE: 99 BPM | SYSTOLIC BLOOD PRESSURE: 121 MMHG | TEMPERATURE: 98 F | OXYGEN SATURATION: 97 % | WEIGHT: 183.5 LBS | HEIGHT: 70 IN | DIASTOLIC BLOOD PRESSURE: 85 MMHG

## 2020-01-24 DIAGNOSIS — K58.2 IRRITABLE BOWEL SYNDROME WITH BOTH CONSTIPATION AND DIARRHEA: Primary | ICD-10-CM

## 2020-01-24 DIAGNOSIS — F33.41 RECURRENT MAJOR DEPRESSIVE DISORDER, IN PARTIAL REMISSION (H): ICD-10-CM

## 2020-01-24 DIAGNOSIS — F41.9 ANXIETY: ICD-10-CM

## 2020-01-24 RX ORDER — MIRTAZAPINE 7.5 MG/1
7.5 TABLET, FILM COATED ORAL
COMMUNITY
Start: 2020-01-23 | End: 2020-05-20

## 2020-01-24 RX ORDER — POLYETHYLENE GLYCOL 3350 17 G/17G
1 POWDER, FOR SOLUTION ORAL DAILY
Qty: 1 BOTTLE | Refills: 11 | COMMUNITY
Start: 2020-01-24 | End: 2020-05-20

## 2020-01-24 RX ORDER — DULOXETIN HYDROCHLORIDE 60 MG/1
CAPSULE, DELAYED RELEASE ORAL
COMMUNITY
Start: 2020-01-24 | End: 2021-10-27

## 2020-01-24 RX ORDER — AMITRIPTYLINE HYDROCHLORIDE 10 MG/1
10 TABLET ORAL AT BEDTIME
Qty: 90 TABLET | Refills: 1 | COMMUNITY
Start: 2020-01-24 | End: 2020-08-28

## 2020-01-24 ASSESSMENT — MIFFLIN-ST. JEOR: SCORE: 1786.09

## 2020-01-24 NOTE — PROGRESS NOTES
Juarez Jauregui is a 34 year old male with history of anxiety, depression and irritable bowel. He is here for the following issues:    Irritable bowel syndrome with both constipation and diarrhea   Juarez's last visit was on 12/16/2019, after a hospitalization for abdominal pain.  He had an extensive workup, which was unremarkable, and was started on dicylomine 10 mg prior to meals and hyoscyamine 0.125 mg for acute abdominal spasms.  Since his last visit, he is doing much better.  He has been using dicyclomine twice a day and hyoscyamine 3-4 times/week and takes 10 minutes to provide relief.  He has been on a low fiber diet since his hospitalization on 12/14/2019, which he believes may attribute for his weight gain. Appetite is good.  He is still having issues with alternating constipation and diarrhea.  He met with GI last week, and they recommend starting Miralax and amitriptyline 10 mg q hs. He feels it is helping.  He had an upper endoscopy that was unremarkable.    Wt Readings from Last 2 Encounters:   01/24/20 83.2 kg (183 lb 8 oz)   12/16/19 78 kg (172 lb)     Depression  Juarez follows with a psychiatrist every 1-2 months and a therapist every month. He is on duloxetine 60mg bid and prazosin 1mg po q hs. He has a lot of situational stress. Lots of job stress. At least 9 coworkers fired in a very short period of time. More responsibility at work, passed over for promotion. Feels overwhelmed with thought of trying to find a new job. He is living in the basement of his friend's house, which allows him to save money to get another place. He is safe. He had to buy another car.  Reports he has a good support system.  He has been working mostly from home. Worried he may lose his job due to health concerns. He has filled out FMLA.     Patient Active Problem List   Diagnosis     Strain of neck muscle, initial encounter     Acute recurrent maxillary sinusitis     Anxiety     History of migraine     Chronic seasonal  allergic rhinitis     Neck pain     Upper back pain     Cervicalgia       Current Outpatient Medications   Medication Sig Dispense Refill     Cholecalciferol (VITAMIN D PO)        Creatine Monohydrate POWD 1 scoop daily into protein shake 454 g 11     cyclobenzaprine (FLEXERIL) 5 MG tablet Take 5 mg by mouth       dicyclomine (BENTYL) 10 MG capsule Take 10 mg by mouth       DULoxetine (CYMBALTA) 30 MG capsule Take 30 mg cap + 60 mg cap for a total of 90 mg daily       DULoxetine (CYMBALTA) 60 MG capsule Take 60 mg cap + 30 mg cap for a total of 90 mg daily       Fish Oil-Cholecalciferol (FISH OIL + D3 PO)        hyoscyamine (LEVSIN/SL) 0.125 MG sublingual tablet Place 1 tablet (0.125 mg) under the tongue every 4 hours as needed for cramping 30 tablet 0     LORazepam (ATIVAN) 1 MG tablet Take 1 tablet (1 mg) by mouth every 8 hours as needed for anxiety 20 tablet 0     omeprazole (PRILOSEC) 40 MG DR capsule Take 1 capsule (40 mg) by mouth daily 90 capsule 0     ORDER FOR ALLERGEN IMMUNOTHERAPY Name of Mix: Mix #1  Cat, Grass, Ragweed, Tree   Cat Hair, Standardized A.P. 10,000 BAU/mL, HS  0.5 ml   Birch Mix PRW 1:20 w/v, HS  0.3 ml  Cedar, Red 1:20 w/v, HS  0.3 ml  Toño (Std) 100,000 BAU/mL, HS 0.3 ml  Ragweed, Mix (Giant, Short) 1:20 w/v, HS 0.3 ml  Diluent: HSA qs to 5ml  Red, yellow, blue, green 5 mL PRN     ORDER FOR ALLERGEN IMMUNOTHERAPY Name of Mix: Mix #2  Mold  Alternaria 10,000 pnu/mL (1:20 w/v), ALK  0.2 ml  Diluent: HSA qs to 5ml   Red, yellow, blue, green 5 mL 1     prazosin (MINIPRESS) 1 MG capsule Take 1 mg by mouth       sildenafil (VIAGRA) 100 MG tablet Take 1 tablet (100 mg) by mouth daily as needed (ED) 6 tablet 3     study - emtricitabine-tenofovir 200-300, IDS#[id number], (TRUVADA) per tablet Take 1 tablet by mouth daily 30 tablet      traZODone (DESYREL) 50 MG tablet Take 0.5 -1 po q hs 30 tablet 0       Allergies   Allergen Reactions     Neosporin Af [Miconazole]      Adhesive Tape Rash     Topical  "creams: neosporin and bacitracin     Bacitracin Rash     Bacitracin-Polymyxin B Rash and GI Disturbance     No Clinical Screening - See Comments Rash     Topical creams: neosporin and bacitracin     Triple Antibiotic Rash        EXAM  /85 (BP Location: Left arm, Patient Position: Sitting, Cuff Size: Adult Large)   Pulse 99   Temp 98  F (36.7  C) (Oral)   Ht 1.79 m (5' 10.47\")   Wt 83.2 kg (183 lb 8 oz)   SpO2 97%   BMI 25.98 kg/m    Gen: Alert, pleasant, somewhat nervous    PHQ-9 SCORE 3/22/2018 1/29/2019 7/8/2019   PHQ-9 Total Score 0 4 6     Assessment  (K58.2) Irritable bowel syndrome with both constipation and diarrhea  (primary encounter diagnosis)  Comment: improved symptoms with addition of bentyl and levsin. Also stared on miralax and amitriptyline by GI doctor.   Plan: hyoscyamine (LEVSIN/SL) 0.125 MG sublingual         tablet        Refilled levsin. Continue with GI for management of symptoms. Expand diet, currently eating low residue    (F41.9) Anxiety  Comment: significant stressors, he sees therapist once a month, reports he is not doing any breathing/ relaxation exercises  Plan: discussed continuing with psychiatrist. Gave resource for our behavioral health clinician at [facility]. He may call for appt.     (F33.41) Recurrent major depressive disorder, in partial remission (H)  Comment: as above on duloxetine, dose recently increased to 120mg daily. Also on prazosin  Plan: as above, continue with psychiatrist, counselor. Recommend consideration of meeting with our [facility].    Total visit time today 25 minutes.  More than 50% of the time was spent in counseling on above issues.      Follow up 4-6 wk.     Danielle Mitchell MD  Internal Medicine/Pediatrics      I, Jay Conteh, am serving as a scribe to document services personally performed by Dr. Danielle Mitchell, based on data collection and the provider's statements to me. Dr. Mitchell has reviewed, edited, and approved " the above note.

## 2020-01-24 NOTE — NURSING NOTE
"34 year old  Chief Complaint   Patient presents with     RECHECK     f/u gastritis        Blood pressure 121/85, pulse 99, temperature 98  F (36.7  C), temperature source Oral, height 1.79 m (5' 10.47\"), weight 83.2 kg (183 lb 8 oz), SpO2 97 %. Body mass index is 25.98 kg/m .  Patient Active Problem List   Diagnosis     Strain of neck muscle, initial encounter     Acute recurrent maxillary sinusitis     Anxiety     History of migraine     Chronic seasonal allergic rhinitis     Neck pain     Upper back pain     Cervicalgia       Wt Readings from Last 2 Encounters:   01/24/20 83.2 kg (183 lb 8 oz)   12/16/19 78 kg (172 lb)     BP Readings from Last 3 Encounters:   01/24/20 121/85   12/16/19 134/86   11/26/19 118/80         Current Outpatient Medications   Medication     Cholecalciferol (VITAMIN D PO)     Creatine Monohydrate POWD     cyclobenzaprine (FLEXERIL) 5 MG tablet     dicyclomine (BENTYL) 10 MG capsule     DULoxetine (CYMBALTA) 60 MG capsule     Fish Oil-Cholecalciferol (FISH OIL + D3 PO)     hyoscyamine (LEVSIN/SL) 0.125 MG sublingual tablet     LORazepam (ATIVAN) 1 MG tablet     mirtazapine (REMERON) 7.5 MG tablet     omeprazole (PRILOSEC) 40 MG DR capsule     ORDER FOR ALLERGEN IMMUNOTHERAPY     ORDER FOR ALLERGEN IMMUNOTHERAPY     prazosin (MINIPRESS) 1 MG capsule     sildenafil (VIAGRA) 100 MG tablet     study - emtricitabine-tenofovir 200-300, IDS#[id number], (TRUVADA) per tablet     Current Facility-Administered Medications   Medication     hydrocortisone (CORTAID) 1 % cream       Social History     Tobacco Use     Smoking status: Never Smoker     Smokeless tobacco: Never Used   Substance Use Topics     Alcohol use: Yes     Comment: social     Drug use: No       Health Maintenance Due   Topic Date Due     HIV SCREENING  06/20/2000     PREVENTIVE CARE VISIT  03/20/2019     PHQ-2  01/01/2020       No results found for: PAP      January 24, 2020 11:47 AM    "

## 2020-02-04 DIAGNOSIS — K29.00 ACUTE GASTRITIS WITHOUT HEMORRHAGE, UNSPECIFIED GASTRITIS TYPE: ICD-10-CM

## 2020-02-04 RX ORDER — OMEPRAZOLE 40 MG/1
40 CAPSULE, DELAYED RELEASE ORAL DAILY
Qty: 90 CAPSULE | Refills: 0 | Status: SHIPPED | OUTPATIENT
Start: 2020-02-04 | End: 2020-03-03

## 2020-02-04 NOTE — TELEPHONE ENCOUNTER
Last time prescribed: 11/12/19 , 90 tabs x 0 refills  Last office visit: 1/24/2020  Next appointment: 3/3/2020    Patient sees GI    Routing refill request to provider for review/approval because: Do you want to continue with this medication?    Chana Gaytan RN  02/04/20  3:16 PM

## 2020-02-10 ENCOUNTER — ALLIED HEALTH/NURSE VISIT (OUTPATIENT)
Dept: ALLERGY | Facility: CLINIC | Age: 35
End: 2020-02-10
Payer: COMMERCIAL

## 2020-02-10 DIAGNOSIS — Z51.6 NEED FOR DESENSITIZATION TO ALLERGENS: Primary | ICD-10-CM

## 2020-02-10 NOTE — PROGRESS NOTES
Patient came in for 2 allergy injections. Patient had no reaction to last shots. I gave 0.4 ml of the red bottle (cat, tree, grass, weed) and 0.4 ml of the red bottle (mold) subcutaneous without any issues.   Patient instructed to wait in building for at least 30 min. after injections  Patients comes into clinic today at the request of Vitaly Messina for allergy injections     No reaction to last injection.     This service provided today was under the direct supervision of Dr. Messina , who was available if needed.     Jaqueline Clinton, RN

## 2020-02-26 ENCOUNTER — TRANSFERRED RECORDS (OUTPATIENT)
Dept: HEALTH INFORMATION MANAGEMENT | Facility: CLINIC | Age: 35
End: 2020-02-26

## 2020-03-03 ENCOUNTER — OFFICE VISIT (OUTPATIENT)
Dept: FAMILY MEDICINE | Facility: CLINIC | Age: 35
End: 2020-03-03
Payer: COMMERCIAL

## 2020-03-03 VITALS
HEIGHT: 70 IN | SYSTOLIC BLOOD PRESSURE: 124 MMHG | HEART RATE: 89 BPM | OXYGEN SATURATION: 97 % | BODY MASS INDEX: 26.09 KG/M2 | TEMPERATURE: 98 F | DIASTOLIC BLOOD PRESSURE: 84 MMHG | WEIGHT: 182.25 LBS

## 2020-03-03 DIAGNOSIS — L40.50 PSORIASIS WITH ARTHROPATHY (H): ICD-10-CM

## 2020-03-03 DIAGNOSIS — K58.2 IRRITABLE BOWEL SYNDROME WITH BOTH CONSTIPATION AND DIARRHEA: Primary | ICD-10-CM

## 2020-03-03 DIAGNOSIS — F33.41 RECURRENT MAJOR DEPRESSIVE DISORDER, IN PARTIAL REMISSION (H): ICD-10-CM

## 2020-03-03 ASSESSMENT — MIFFLIN-ST. JEOR: SCORE: 1780.42
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
## 2020-03-03 NOTE — NURSING NOTE
"34 year old  Chief Complaint   Patient presents with     Follow Up     gastritis        Blood pressure 124/84, pulse 89, temperature 98  F (36.7  C), temperature source Oral, height 1.79 m (5' 10.47\"), weight 82.7 kg (182 lb 4 oz), SpO2 97 %. Body mass index is 25.8 kg/m .  Patient Active Problem List   Diagnosis     Strain of neck muscle, initial encounter     Acute recurrent maxillary sinusitis     Anxiety     History of migraine     Chronic seasonal allergic rhinitis     Neck pain     Upper back pain     Cervicalgia     Recurrent major depressive disorder, in partial remission (H)       Wt Readings from Last 2 Encounters:   03/03/20 82.7 kg (182 lb 4 oz)   01/24/20 83.2 kg (183 lb 8 oz)     BP Readings from Last 3 Encounters:   03/03/20 124/84   01/24/20 121/85   12/16/19 134/86         Current Outpatient Medications   Medication     amitriptyline (ELAVIL) 10 MG tablet     apremilast (OTEZLA) 30 MG tablet     Cholecalciferol (VITAMIN D PO)     Creatine Monohydrate POWD     cyclobenzaprine (FLEXERIL) 5 MG tablet     dicyclomine (BENTYL) 10 MG capsule     DULoxetine (CYMBALTA) 60 MG capsule     Fish Oil-Cholecalciferol (FISH OIL + D3 PO)     hyoscyamine (LEVSIN/SL) 0.125 MG sublingual tablet     LORazepam (ATIVAN) 1 MG tablet     mirtazapine (REMERON) 7.5 MG tablet     omeprazole (PRILOSEC) 40 MG DR capsule     ORDER FOR ALLERGEN IMMUNOTHERAPY     ORDER FOR ALLERGEN IMMUNOTHERAPY     polyethylene glycol (MIRALAX) powder     prazosin (MINIPRESS) 1 MG capsule     sildenafil (VIAGRA) 100 MG tablet     study - emtricitabine-tenofovir 200-300, IDS#[id number], (TRUVADA) per tablet     Current Facility-Administered Medications   Medication     hydrocortisone (CORTAID) 1 % cream       Social History     Tobacco Use     Smoking status: Never Smoker     Smokeless tobacco: Never Used   Substance Use Topics     Alcohol use: Yes     Comment: social     Drug use: No       Health Maintenance Due   Topic Date Due     DEPRESSION ACTION " PLAN  1985     HIV SCREENING  06/20/2000     PREVENTIVE CARE VISIT  03/20/2019     PHQ-9  01/05/2020       No results found for: PAP      March 3, 2020 9:59 AM

## 2020-03-07 ASSESSMENT — ANXIETY QUESTIONNAIRES
GAD7 TOTAL SCORE: 11
3. WORRYING TOO MUCH ABOUT DIFFERENT THINGS: MORE THAN HALF THE DAYS
2. NOT BEING ABLE TO STOP OR CONTROL WORRYING: MORE THAN HALF THE DAYS
IF YOU CHECKED OFF ANY PROBLEMS ON THIS QUESTIONNAIRE, HOW DIFFICULT HAVE THESE PROBLEMS MADE IT FOR YOU TO DO YOUR WORK, TAKE CARE OF THINGS AT HOME, OR GET ALONG WITH OTHER PEOPLE: SOMEWHAT DIFFICULT
7. FEELING AFRAID AS IF SOMETHING AWFUL MIGHT HAPPEN: MORE THAN HALF THE DAYS
6. BECOMING EASILY ANNOYED OR IRRITABLE: NOT AT ALL
5. BEING SO RESTLESS THAT IT IS HARD TO SIT STILL: SEVERAL DAYS
1. FEELING NERVOUS, ANXIOUS, OR ON EDGE: MORE THAN HALF THE DAYS

## 2020-03-07 ASSESSMENT — PATIENT HEALTH QUESTIONNAIRE - PHQ9
SUM OF ALL RESPONSES TO PHQ QUESTIONS 1-9: 9
5. POOR APPETITE OR OVEREATING: MORE THAN HALF THE DAYS

## 2020-03-08 ASSESSMENT — ANXIETY QUESTIONNAIRES: GAD7 TOTAL SCORE: 11

## 2020-03-09 ENCOUNTER — ALLIED HEALTH/NURSE VISIT (OUTPATIENT)
Dept: ALLERGY | Facility: CLINIC | Age: 35
End: 2020-03-09
Payer: COMMERCIAL

## 2020-03-09 DIAGNOSIS — Z51.6 NEED FOR DESENSITIZATION TO ALLERGENS: Primary | ICD-10-CM

## 2020-03-09 NOTE — PROGRESS NOTES
Patient came in for 2 allergy injection. Patient had no reaction to last shots. I gave 0.4 ml of the red bottle (C, T, G, W) and 0.4 ml of the red bottle (M) subcutaneous without any issues.   Patient instructed to wait in building for at least 30 min. after injections  Patients comes into clinic today at the request of Dr. Messina for allergy injections     No reaction to last injection.     This service provided today was under the direct supervision of Dr. Messina, who was available if needed.     Jaqueline Clinton RN

## 2020-03-10 ENCOUNTER — HEALTH MAINTENANCE LETTER (OUTPATIENT)
Age: 35
End: 2020-03-10

## 2020-03-23 ENCOUNTER — TELEPHONE (OUTPATIENT)
Dept: ALLERGY | Facility: CLINIC | Age: 35
End: 2020-03-23

## 2020-05-06 ENCOUNTER — MYC MEDICAL ADVICE (OUTPATIENT)
Dept: ALLERGY | Facility: CLINIC | Age: 35
End: 2020-05-06

## 2020-05-06 ENCOUNTER — TELEPHONE (OUTPATIENT)
Dept: ALLERGY | Facility: CLINIC | Age: 35
End: 2020-05-06

## 2020-05-06 NOTE — TELEPHONE ENCOUNTER
LVM for patient to call back to schedule an allergy injection appointment in a 30 minute return allergy slot with . Patient needs to be informed that this will be a clinic visit due to the time gap since her last injection and also needs to be informed of [facility] restrictions like masks.

## 2020-05-19 NOTE — PROGRESS NOTES
Family Medicine Video Visit Note  Juarez Jauregui is being evaluated via a billable video visit.    Consent documented below.  Chief Complaint   Patient presents with     Recheck Medication     wants to look over medication list and try to cut back            HPI     Video Start Time: 9:13am  THIS is the time provider and patient connect.    Juarez Jauregui is a 34 year old male who presents to the clinic for the following health issues:  9:13 AM  9:36 AM  23 minutes    Irritable bowel with constipation and diarrhea  Juarez Jauregui is a 34 year old male with history of irritable bowel. He has been followed by me and gastroenterology for symptoms. He was prescribed Bentyl, Levsin and amitriptyline and had been taking these regularly with good results. He has underlying history of psoriasis and had been started on biologics in the past year. Since staring Chen, he has noticed that his bowel symptoms have markedly improved. He is wondering if he can cut back or stop medications.    He currently takes Bentyl before breakfast, then again at bedtime. Largest meal is in the evening. He has no abdominal cramping, pain or bloating with meals. No use of Levsin. He has gained about 10 pounds in the past 6 months. Appetite is good. Denies constipation or diarrhea. No longer using Miralax. He was prescribed amitrypitline by the GI doctor and is still taking it.      Depression/ anxiety  Juarez is currently seeing a psychiatrist. He is currently taking cymbalta 120mg daily, mirtazapine 15mg po q hs and prazosin (just recently increased to 2mg to treat nightmares). Juarez reports his mood has been stable. He reports he is interested in going to medical school to become a psychiatrist. He plans to start taking online [employer] courses this summer, starting with [employer].     PHQ 7/8/2019 3/7/2020 5/20/2020   PHQ-9 Total Score 6 9 3   Q9: Thoughts of better off dead/self-harm past 2 weeks Not at all Not at all Not at all     LEONOR-7 SCORE  7/8/2019 3/7/2020 5/20/2020   Total Score - - 4 (minimal anxiety)   Total Score 17 11 4     Irritable bowel syndrome with both constipation and diarrhea  Juarez has a longstanding history of GI issues. He is followed by gastroenterology. I last saw Juarez 12/16/2019, after a hospitalization for abdominal pain.  He had an extensive workup, which was unremarkable. He was started on dicylomine 10 mg prior to meals and hyoscyamine 0.125 mg for acute abdominal spasms. At his last visit on 01/24/2020,  he was doing better with the use of dicyclomine twice a day and hyoscyamine 3-4 times/week. He was also having constipation and diarrhea that was improving with Miralax and amitriptyline 10 mg q hs. Today he reports his symptoms continue to improve. He has not used the hyoscyamine in a couple weeks.     Patient Active Problem List   Diagnosis     Strain of neck muscle, initial encounter     Acute recurrent maxillary sinusitis     Anxiety     History of migraine     Chronic seasonal allergic rhinitis     Neck pain     Upper back pain     Cervicalgia     Recurrent major depressive disorder, in partial remission (H)     Past Surgical History:   Procedure Laterality Date     COLONOSCOPY  10/2007     DENTAL SURGERY         Social History     Tobacco Use     Smoking status: Never Smoker     Smokeless tobacco: Never Used   Substance Use Topics     Alcohol use: Yes     Comment: social     Family History   Problem Relation Age of Onset     Pre-Diabetes Mother      Parkinsonism Maternal Grandmother      Heart Failure Maternal Grandmother      Arrhythmia Maternal Grandmother         on blood thinners     Cancer Maternal Grandmother         TOB, ? esophageal     Dementia Maternal Grandfather      Prostate Cancer Maternal Grandfather            Reviewed and updated as needed this visit by Provider              Review of Systems:     Constitutional, HEENT, cardiovascular, pulmonary, gi and gu systems are negative, except as otherwise  "noted.         Physical Exam:     There were no vitals taken for this visit.    GENERAL: Healthy, alert and no distress  EYES: Eyes grossly normal to inspection, conjunctivae and sclerae normal  RESP: no audible wheeze, cough, or visible cyanosis.  No visible retractions or increased work of breathing.  Able to speak fully in complete sentences.  NEURO: Cranial nerves grossly intact, mentation intact and speech normal  PSYCH: mentation appears normal, affect normal/bright, judgement and insight intact, normal speech and appearance well-groomed          Assessment and Plan   (K58.2) Irritable bowel syndrome with both constipation and diarrhea  (primary encounter diagnosis)  Comment: currently bowel symptoms are minimal since starting biologic for treatment of psoriasis.  Plan: OK to discontinue Bentyl, may use Levsin for prn flares. OK to discontinue amitriptyline. Contact me if symptoms are persistent, worsening    (F33.41) Recurrent major depressive disorder, in partial remission (H)  Comment: doing well, under the care of psychiatrist  Plan: continue with therapist and psychiatrist.             After Visit Information:  Patient chose to view AVS via [employer]      Danielle Mitchell MD      Video-Visit Details         Video Visit Consent     The patient has been notified of following:     \"This video visit will be conducted via a call between you and your physician/provider. We have found that certain health care needs can be provided without the need for an in-person physical exam.  This service lets us provide the care you need with a video conversation.  If a prescription is necessary we can send it directly to your pharmacy.  If lab work is needed we can place an order for that and you can then stop by our lab to have the test done at a later time.    Video visits are billed at different rates depending on your insurance coverage.  Please reach out to your insurance provider with any questions.    If during " "the course of the call the physician/provider feels a video visit is not appropriate, you will not be charged for this service.\"    Patient has given verbal consent for Video visit? Yes    How would you like to obtain your AVS? Cr    Patient would like the video invitation sent by: Text to cell phone: [phone]    Will anyone else be joining your video visit? No     Type of service:  Video Visit  Video Start Time: 9:13 am  THIS is the time provider and patient connect.  Video End Time (time video stopped): 9:36 am  Total is 23 minutes    Originating Location (pt. Location): Home    Distant Location (provider location):  [facility]     Mode of Communication:  Video Conference via [employer]                          "

## 2020-05-20 ENCOUNTER — VIRTUAL VISIT (OUTPATIENT)
Dept: FAMILY MEDICINE | Facility: CLINIC | Age: 35
End: 2020-05-20
Payer: COMMERCIAL

## 2020-05-20 VITALS — WEIGHT: 180 LBS | BODY MASS INDEX: 25.77 KG/M2 | HEIGHT: 70 IN

## 2020-05-20 DIAGNOSIS — K58.2 IRRITABLE BOWEL SYNDROME WITH BOTH CONSTIPATION AND DIARRHEA: Primary | ICD-10-CM

## 2020-05-20 DIAGNOSIS — F33.41 RECURRENT MAJOR DEPRESSIVE DISORDER, IN PARTIAL REMISSION (H): ICD-10-CM

## 2020-05-20 RX ORDER — MIRTAZAPINE 7.5 MG/1
TABLET, FILM COATED ORAL
Qty: 60 TABLET | Refills: 11 | COMMUNITY
Start: 2020-05-20 | End: 2020-09-05

## 2020-05-20 RX ORDER — PRAZOSIN HYDROCHLORIDE 1 MG/1
CAPSULE ORAL
Qty: 60 CAPSULE | Refills: 11 | COMMUNITY
Start: 2020-05-20 | End: 2020-09-05

## 2020-05-20 RX ORDER — RISANKIZUMAB-RZAA 75 MG/0.83
KIT SUBCUTANEOUS
COMMUNITY
Start: 2020-04-08 | End: 2021-08-20

## 2020-05-20 ASSESSMENT — MIFFLIN-ST. JEOR: SCORE: 1762.72

## 2020-05-22 ENCOUNTER — OFFICE VISIT (OUTPATIENT)
Dept: ALLERGY | Facility: CLINIC | Age: 35
End: 2020-05-22
Payer: COMMERCIAL

## 2020-05-22 DIAGNOSIS — Z51.6 NEED FOR DESENSITIZATION TO ALLERGENS: Primary | ICD-10-CM

## 2020-05-22 ASSESSMENT — PAIN SCALES - GENERAL: PAINLEVEL: NO PAIN (0)

## 2020-05-22 NOTE — PROGRESS NOTES
[facility]-Allergology Note for Immunotherapy    Allergy Problem List:    Specialty Problems        Allergy Diagnoses    Acute recurrent maxillary sinusitis        Chronic seasonal allergic rhinitis              CC:   Allergy Injection (Juarez is here for allergy injection )      Immunotherapy history:  IT started: 2/18/2019  IT extracts:     Alternaria 0.2 = mold    Ko white 0.4, birch 0.4, elm 0.4, maple 0.4, grass #4 0.2, cocklebur 0.4, lambs quarter 0.4, pigweed 0.4, ragweed 0.4, mugwort 0.4    Side effects during IT: sometimes on left side local reaction (pollens)    Encounter Date: May 22, 2020    History of Present Illness:  I have reviewed the teledermatology  information and the nursing intake corresponding to this issue. Juarez Jauregui is a 34 year old male who presents for IT to our consultation (restart after COVID crisis) or problems with IT    Past Medical History:   Patient Active Problem List   Diagnosis     Strain of neck muscle, initial encounter     Acute recurrent maxillary sinusitis     Anxiety     History of migraine     Chronic seasonal allergic rhinitis     Neck pain     Upper back pain     Cervicalgia     Recurrent major depressive disorder, in partial remission (H)     Past Medical History:   Diagnosis Date     Prostatitis        Allergy History:     Allergies   Allergen Reactions     Neosporin Af [Miconazole]      Adhesive Tape Rash     Topical creams: neosporin and bacitracin     Bacitracin Rash     Bacitracin-Polymyxin B Rash and GI Disturbance     No Clinical Screening - See Comments Rash     Topical creams: neosporin and bacitracin     Triple Antibiotic Rash       Social History:     reports that he has never smoked. He has never used smokeless tobacco. He reports current alcohol use. He reports that he does not use drugs.    Family History:  Family History   Problem Relation Age of Onset     Pre-Diabetes Mother      Parkinsonism Maternal Grandmother       Heart Failure Maternal Grandmother      Arrhythmia Maternal Grandmother         on blood thinners     Cancer Maternal Grandmother         TOB, ? esophageal     Dementia Maternal Grandfather      Prostate Cancer Maternal Grandfather        Medications:  Current Outpatient Medications   Medication Sig Dispense Refill     amitriptyline (ELAVIL) 10 MG tablet Take 1 tablet (10 mg) by mouth At Bedtime 90 tablet 1     Cholecalciferol (VITAMIN D PO)        Creatine Monohydrate POWD 1 scoop daily into protein shake 454 g 11     cyclobenzaprine (FLEXERIL) 5 MG tablet Take 5 mg by mouth       dicyclomine (BENTYL) 10 MG capsule Take 10 mg by mouth       DULoxetine (CYMBALTA) 60 MG capsule Take 120mg daily--follows with psychiatry       Fish Oil-Cholecalciferol (FISH OIL + D3 PO)        hyoscyamine (LEVSIN/SL) 0.125 MG sublingual tablet Place 1 tablet (0.125 mg) under the tongue every 4 hours as needed for cramping 30 tablet 11     LORazepam (ATIVAN) 1 MG tablet Take 1 tablet (1 mg) by mouth every 8 hours as needed for anxiety 20 tablet 0     mirtazapine (REMERON) 7.5 MG tablet Take 2 at bedtime, follows with psychiatrist 60 tablet 11     ORDER FOR ALLERGEN IMMUNOTHERAPY Name of Mix: Mix #1  Cat, Grass, Ragweed, Tree   Cat Hair, Standardized A.P. 10,000 BAU/mL, HS  0.5 ml   Birch Mix PRW 1:20 w/v, HS  0.3 ml  Cedar, Red 1:20 w/v, HS  0.3 ml  Toño (Std) 100,000 BAU/mL, HS 0.3 ml  Ragweed, Mix (Giant, Short) 1:20 w/v, HS 0.3 ml  Diluent: HSA qs to 5ml  Red, yellow, blue, green 5 mL PRN     ORDER FOR ALLERGEN IMMUNOTHERAPY Name of Mix: Mix #2  Mold  Alternaria 10,000 pnu/mL (1:20 w/v), ALK  0.2 ml  Diluent: HSA qs to 5ml   Red, yellow, blue, green 5 mL 1     prazosin (MINIPRESS) 1 MG capsule Take 2 at bedtime, follows with psychiatry 60 capsule 11     sildenafil (VIAGRA) 100 MG tablet Take 1 tablet (100 mg) by mouth daily as needed (ED) 6 tablet 3     SKYRIZI, 150 MG DOSE, 75 MG/0.83ML subcutaneous        study -  emtricitabine-tenofovir 200-300, IDS#[id number], (TRUVADA) per tablet Take 1 tablet by mouth daily 30 tablet        Review of Systems:  -As per HPI  -Constitutional: The patient denies fatigue, fevers, chills, unintended weight loss, and night sweats.  -HEENT: Patient denies nonhealing oral sores.  -Skin: As above in HPI. No additional skin concerns.    Physical exam:  Vitals: There were no vitals taken for this visit.  GEN: This is a well developed, well-nourished male in no acute distress, in a pleasant mood.    Seasonal symptoms started this week  SKIN: contact urticaria on legs with grass contact  Respiratory system/asthma: no asthma  Nose: stuffy, runny nose  Conjunctivits: itchy red eyes  Others:  Patient now on Flonase, eyes drops and Benadryl    -No other lesions of concern on areas examined.     Impression/Plan:    >> allergic rhinoconjunctivitis without Asthma on Immunotherapy    Last Immunotherapy: 3/9/2020 (10 weeks)    For both red 0.4 ml red 1:1    Immunotherapy today:  To simplify procedure drop and increase with both allergens: 0.1ml of red 1:1 extract because in season      Follow-up for next Immunotherapy in 1 week and then increase weekly by 0.1ml to max maintenance 0.4 or 0.5ml    Try to use during the day instead of Benadryl OTC Cetirizine or Loratadine    I spent a total of 12 minutes face to face with Jazlyn Duke during today s office visit. Over 50% of this time was spent counseling the patient and/or coordinating care. IT injection was done by nurse Jaqueline Clinton.

## 2020-05-22 NOTE — LETTER
5/22/2020         RE: Juarez Jauregui  [address] [facility] [address]        Dear Colleague,    Thank you for referring your patient, Juarez Jauregui, to the [facility]. Please see a copy of my visit note below.    Allergy Rooming Note    Juarez Jauregui's goals for this visit include:   Chief Complaint   Patient presents with     Allergy Injection     Juarez is here for allergy injection      Savanna Longoria LPN      [facility]-Allergology Note for Immunotherapy    Allergy Problem List:    Specialty Problems        Allergy Diagnoses    Acute recurrent maxillary sinusitis        Chronic seasonal allergic rhinitis              CC:   Allergy Injection (Juarez is here for allergy injection )      Immunotherapy history:  IT started: 2/18/2019  IT extracts:     Alternaria 0.2 = mold    Ko white 0.4, birch 0.4, elm 0.4, maple 0.4, grass #4 0.2, cocklebur 0.4, lambs quarter 0.4, pigweed 0.4, ragweed 0.4, mugwort 0.4    Side effects during IT: sometimes on left side local reaction (pollens)    Encounter Date: May 22, 2020    History of Present Illness:  I have reviewed the teledermatology  information and the nursing intake corresponding to this issue. Juarez Jauregui is a 34 year old male who presents for IT to our consultation (restart after COVID crisis) or problems with IT    Past Medical History:   Patient Active Problem List   Diagnosis     Strain of neck muscle, initial encounter     Acute recurrent maxillary sinusitis     Anxiety     History of migraine     Chronic seasonal allergic rhinitis     Neck pain     Upper back pain     Cervicalgia     Recurrent major depressive disorder, in partial remission (H)     Past Medical History:   Diagnosis Date     Prostatitis        Allergy History:     Allergies   Allergen Reactions     Neosporin Af [Miconazole]      Adhesive Tape Rash     Topical creams: neosporin and bacitracin     Bacitracin Rash     Bacitracin-Polymyxin B Rash  and GI Disturbance     No Clinical Screening - See Comments Rash     Topical creams: neosporin and bacitracin     Triple Antibiotic Rash       Social History:     reports that he has never smoked. He has never used smokeless tobacco. He reports current alcohol use. He reports that he does not use drugs.    Family History:  Family History   Problem Relation Age of Onset     Pre-Diabetes Mother      Parkinsonism Maternal Grandmother      Heart Failure Maternal Grandmother      Arrhythmia Maternal Grandmother         on blood thinners     Cancer Maternal Grandmother         TOB, ? esophageal     Dementia Maternal Grandfather      Prostate Cancer Maternal Grandfather        Medications:  Current Outpatient Medications   Medication Sig Dispense Refill     amitriptyline (ELAVIL) 10 MG tablet Take 1 tablet (10 mg) by mouth At Bedtime 90 tablet 1     Cholecalciferol (VITAMIN D PO)        Creatine Monohydrate POWD 1 scoop daily into protein shake 454 g 11     cyclobenzaprine (FLEXERIL) 5 MG tablet Take 5 mg by mouth       dicyclomine (BENTYL) 10 MG capsule Take 10 mg by mouth       DULoxetine (CYMBALTA) 60 MG capsule Take 120mg daily--follows with psychiatry       Fish Oil-Cholecalciferol (FISH OIL + D3 PO)        hyoscyamine (LEVSIN/SL) 0.125 MG sublingual tablet Place 1 tablet (0.125 mg) under the tongue every 4 hours as needed for cramping 30 tablet 11     LORazepam (ATIVAN) 1 MG tablet Take 1 tablet (1 mg) by mouth every 8 hours as needed for anxiety 20 tablet 0     mirtazapine (REMERON) 7.5 MG tablet Take 2 at bedtime, follows with psychiatrist 60 tablet 11     ORDER FOR ALLERGEN IMMUNOTHERAPY Name of Mix: Mix #1  Cat, Grass, Ragweed, Tree   Cat Hair, Standardized A.P. 10,000 BAU/mL, HS  0.5 ml   Birch Mix PRW 1:20 w/v, HS  0.3 ml  Cedar, Red 1:20 w/v, HS  0.3 ml  Toño (Std) 100,000 BAU/mL, HS 0.3 ml  Ragweed, Mix (Giant, Short) 1:20 w/v, HS 0.3 ml  Diluent: HSA qs to 5ml  Red, yellow, blue, green 5 mL PRN     ORDER  FOR ALLERGEN IMMUNOTHERAPY Name of Mix: Mix #2  Mold  Alternaria 10,000 pnu/mL (1:20 w/v), ALK  0.2 ml  Diluent: HSA qs to 5ml   Red, yellow, blue, green 5 mL 1     prazosin (MINIPRESS) 1 MG capsule Take 2 at bedtime, follows with psychiatry 60 capsule 11     sildenafil (VIAGRA) 100 MG tablet Take 1 tablet (100 mg) by mouth daily as needed (ED) 6 tablet 3     SKYRIZI, 150 MG DOSE, 75 MG/0.83ML subcutaneous        study - emtricitabine-tenofovir 200-300, IDS#[id number], (TRUVADA) per tablet Take 1 tablet by mouth daily 30 tablet        Review of Systems:  -As per HPI  -Constitutional: The patient denies fatigue, fevers, chills, unintended weight loss, and night sweats.  -HEENT: Patient denies nonhealing oral sores.  -Skin: As above in HPI. No additional skin concerns.    Physical exam:  Vitals: There were no vitals taken for this visit.  GEN: This is a well developed, well-nourished male in no acute distress, in a pleasant mood.    Seasonal symptoms started this week  SKIN: contact urticaria on legs with grass contact  Respiratory system/asthma: no asthma  Nose: stuffy, runny nose  Conjunctivits: itchy red eyes  Others:  Patient now on Flonase, eyes drops and Benadryl    -No other lesions of concern on areas examined.     Impression/Plan:    >> allergic rhinoconjunctivitis without Asthma on Immunotherapy    Last Immunotherapy: 3/9/2020 (10 weeks)    For both red 0.4 ml red 1:1    Immunotherapy today:  To simplify procedure drop and increase with both allergens: 0.1ml of red 1:1 extract because in season      Follow-up for next Immunotherapy in 1 week and then increase weekly by 0.1ml to max maintenance 0.4 or 0.5ml    Try to use during the day instead of Benadryl OTC Cetirizine or Loratadine    I spent a total of 12 minutes face to face with Jazlyn Duke during today s office visit. Over 50% of this time was spent counseling the patient and/or coordinating care. IT injection was done by nurse Jaqueline Clinton.     Patient  came in for 2 Allergy injection. Patient had no reaction to last shots. I gave 0.1 ml of the red bottle subcutaneous without any issues.   Patient instructed to wait in building for at least 30 min. after injections  Patients comes into clinic today at the request of  for allergy injections     No reaction to last injection.     This service provided today was under the direct supervision of   , who was available if needed.     Savanna Longoria LPN         Again, thank you for allowing me to participate in the care of your patient.        Sincerely,        Stefano Dickey MD

## 2020-05-22 NOTE — PROGRESS NOTES
Patient came in for 2 Allergy injection. Patient had no reaction to last shots. I gave 0.1 ml of the red bottle subcutaneous without any issues.   Patient instructed to wait in building for at least 30 min. after injections  Patients comes into clinic today at the request of  for allergy injections     No reaction to last injection.     This service provided today was under the direct supervision of   , who was available if needed.     Savanna Longoria LPN

## 2020-05-22 NOTE — PROGRESS NOTES
Allergy Rooming Note    Juarez Jauregui's goals for this visit include:   Chief Complaint   Patient presents with     Allergy Injection     Juarez is here for allergy injection      Savanna Longoria LPN

## 2020-05-29 ENCOUNTER — ALLIED HEALTH/NURSE VISIT (OUTPATIENT)
Dept: ALLERGY | Facility: CLINIC | Age: 35
End: 2020-05-29
Payer: COMMERCIAL

## 2020-05-29 DIAGNOSIS — Z51.6 NEED FOR DESENSITIZATION TO ALLERGENS: Primary | ICD-10-CM

## 2020-05-29 NOTE — PROGRESS NOTES
Patient came in for 2 allergy injections. Patient had no reaction to last shots. I gave 0.2 ml of the red bottle x2 subcutaneous without any issues.   Patient instructed to wait in building for at least 30 min. after injections  Patients comes into clinic today at the request of Dr. Messina for allergy injections     No reaction to last injection.     This service provided today was under the direct supervision of Dr. Dickey, who was available if needed.     Jaqueline Clinton RN

## 2020-06-05 ENCOUNTER — ALLIED HEALTH/NURSE VISIT (OUTPATIENT)
Dept: ALLERGY | Facility: CLINIC | Age: 35
End: 2020-06-05
Payer: COMMERCIAL

## 2020-06-05 DIAGNOSIS — Z51.6 NEED FOR DESENSITIZATION TO ALLERGENS: Primary | ICD-10-CM

## 2020-06-05 NOTE — PROGRESS NOTES
Patient came in for 2 allergy injection. Patient had no reaction to last shots. I gave 0.3 ml of the red bottle x2 subcutaneous without any issues.   Patient instructed to wait in building for at least 30 min. after injections  Patients comes into clinic today at the request of Dr. Messina for allergy injections     No reaction to last injection.     This service provided today was under the direct supervision of Dr. Dickey, who was available if needed.     Jaqueline Clinton RN

## 2020-06-10 LAB — HEP C HIM: NORMAL

## 2020-06-12 ENCOUNTER — TELEPHONE (OUTPATIENT)
Dept: ALLERGY | Facility: CLINIC | Age: 35
End: 2020-06-12

## 2020-06-12 ENCOUNTER — MYC MEDICAL ADVICE (OUTPATIENT)
Dept: ALLERGY | Facility: CLINIC | Age: 35
End: 2020-06-12

## 2020-06-12 NOTE — TELEPHONE ENCOUNTER
LVM x2 informing patient that his appointment today will be cancelled since the clinic has not received his new allergy injection serum.     Provided patient with clinic phone number to contact with any questions or concerns.

## 2020-06-15 ENCOUNTER — VIRTUAL VISIT (OUTPATIENT)
Dept: ALLERGY | Facility: CLINIC | Age: 35
End: 2020-06-15
Payer: COMMERCIAL

## 2020-06-15 ENCOUNTER — MYC MEDICAL ADVICE (OUTPATIENT)
Dept: ALLERGY | Facility: CLINIC | Age: 35
End: 2020-06-15

## 2020-06-15 DIAGNOSIS — J30.1 SEASONAL ALLERGIC RHINITIS DUE TO POLLEN: Primary | ICD-10-CM

## 2020-06-15 NOTE — PROGRESS NOTES
Reason for Visit  Juarez Jauregui is a 34 year old male who is here for follow-up for allergies.    Allergy HPI  Allergies are a lot better, using less OTC meds.  This year a little more Nasonex using 2 sprasy each nostril daily.  Also Clalritin D. If no D gets swollen in the nose.  Left arm is mostly always quarter or less. Was hives on the arms once.       The patient was seen and examined by Vitaly Julien MD, MD   Current Outpatient Medications   Medication     amitriptyline (ELAVIL) 10 MG tablet     Cholecalciferol (VITAMIN D PO)     Creatine Monohydrate POWD     cyclobenzaprine (FLEXERIL) 5 MG tablet     dicyclomine (BENTYL) 10 MG capsule     DULoxetine (CYMBALTA) 60 MG capsule     Fish Oil-Cholecalciferol (FISH OIL + D3 PO)     hyoscyamine (LEVSIN/SL) 0.125 MG sublingual tablet     LORazepam (ATIVAN) 1 MG tablet     mirtazapine (REMERON) 7.5 MG tablet     ORDER FOR ALLERGEN IMMUNOTHERAPY     ORDER FOR ALLERGEN IMMUNOTHERAPY     prazosin (MINIPRESS) 1 MG capsule     sildenafil (VIAGRA) 100 MG tablet     SKYRIZI, 150 MG DOSE, 75 MG/0.83ML subcutaneous     study - emtricitabine-tenofovir 200-300, IDS#[id number], (TRUVADA) per tablet     Current Facility-Administered Medications   Medication     hydrocortisone (CORTAID) 1 % cream     Allergies   Allergen Reactions     Neosporin Af [Miconazole]      Adhesive Tape Rash     Topical creams: neosporin and bacitracin     Bacitracin Rash     Bacitracin-Polymyxin B Rash and GI Disturbance     No Clinical Screening - See Comments Rash     Topical creams: neosporin and bacitracin     Triple Antibiotic Rash     Social History     Socioeconomic History     Marital status: Single     Spouse name: Not on file     Number of children: Not on file     Years of education: Not on file     Highest education level: Not on file   Occupational History     Not on file   Social Needs     Financial resource strain: Not on file     Food insecurity     Worry: Not on file     Inability:  Not on file     Transportation needs     Medical: Not on file     Non-medical: Not on file   Tobacco Use     Smoking status: Never Smoker     Smokeless tobacco: Never Used   Substance and Sexual Activity     Alcohol use: Yes     Comment: social     Drug use: No     Sexual activity: Not on file   Lifestyle     Physical activity     Days per week: Not on file     Minutes per session: Not on file     Stress: Not on file   Relationships     Social connections     Talks on phone: Not on file     Gets together: Not on file     Attends Taoism service: Not on file     Active member of club or organization: Not on file     Attends meetings of clubs or organizations: Not on file     Relationship status: Not on file     Intimate partner violence     Fear of current or ex partner: Not on file     Emotionally abused: Not on file     Physically abused: Not on file     Forced sexual activity: Not on file   Other Topics Concern     Not on file   Social History Narrative     Not on file     Past Medical History:   Diagnosis Date     Prostatitis      Past Surgical History:   Procedure Laterality Date     COLONOSCOPY  10/2007     DENTAL SURGERY       Family History   Problem Relation Age of Onset     Pre-Diabetes Mother      Parkinsonism Maternal Grandmother      Heart Failure Maternal Grandmother      Arrhythmia Maternal Grandmother         on blood thinners     Cancer Maternal Grandmother         TOB, ? esophageal     Dementia Maternal Grandfather      Prostate Cancer Maternal Grandfather          ROS   A complete ROS was otherwise negative except as noted in the HPI.  There were no vitals taken for this visit.  Exam:   GENERAL APPEARANCE: Well developed, well nourished, alert, and in no apparent distress.  EYES: EOMI, conjunctiva clear non-injected  HENT: No dis harge  MOUTH: Oral mucosa is moist.  RESP: no cough or wheezing.  MS: Extremities normal. No clubbing. No cyanosis.  SKIN: No rashes noted.  NEURO: Speech normal, normal  strength and tone.  PSYCH: Normal mentation, orientation to person, place, and time.  Results:      Assessment and plan:   He is doing well in regards to shots and allergies.  If swelling on left bigger than a quarter we may need to adjust the dose.  Continue Nasonex 2 sprays in each nostril daily and Calritin and occasionally claritin D    Follow up in about 8-9 months.     Visit was 8 minutes via video and then we were disconnected so another 3 via phone.

## 2020-06-15 NOTE — PROGRESS NOTES
"Juarez Jauregui is a 34 year old male who is being evaluated via a billable video visit.      The patient has been notified of following:     \"This video visit will be conducted via a call between you and your physician/provider. We have found that certain health care needs can be provided without the need for an in-person physical exam.  This service lets us provide the care you need with a video conversation.  If a prescription is necessary we can send it directly to your pharmacy.  If lab work is needed we can place an order for that and you can then stop by our lab to have the test done at a later time.    Video visits are billed at different rates depending on your insurance coverage.  Please reach out to your insurance provider with any questions.    If during the course of the call the physician/provider feels a video visit is not appropriate, you will not be charged for this service.\"    Patient has given verbal consent for Video visit? Yes    Will anyone else be joining your video visit? No        Video-Visit Details    Type of service:  Video Visit    Video Start Time: 1:41Video End Time: End time about 1:50 and then finished on the phone for another 3 minutes as we lost connection.   Originating Location (pt. Location): Home    Distant Location (provider location):  [facility]     Platform used for Video Visit: Kili    "

## 2020-06-15 NOTE — LETTER
"    6/15/2020         RE: Juarez Jauregui  [facility] [facility] [address]        Dear Colleague,    Thank you for referring your patient, Juarez Jauregui, to the [facility]. Please see a copy of my visit note below.    Juarez Jauregui is a 34 year old male who is being evaluated via a billable video visit.      The patient has been notified of following:     \"This video visit will be conducted via a call between you and your physician/provider. We have found that certain health care needs can be provided without the need for an in-person physical exam.  This service lets us provide the care you need with a video conversation.  If a prescription is necessary we can send it directly to your pharmacy.  If lab work is needed we can place an order for that and you can then stop by our lab to have the test done at a later time.    Video visits are billed at different rates depending on your insurance coverage.  Please reach out to your insurance provider with any questions.    If during the course of the call the physician/provider feels a video visit is not appropriate, you will not be charged for this service.\"    Patient has given verbal consent for Video visit? Yes    Will anyone else be joining your video visit? No        Video-Visit Details    Type of service:  Video Visit    Video Start Time: 1:41Video End Time: End time about 1:50 and then finished on the phone for another 3 minutes as we lost connection.   Originating Location (pt. Location): Home    Distant Location (provider location):  [facility]     Platform used for Video Visit: Salonmeister      Reason for Visit  Juarez Jauregui is a 34 year old male who is here for follow-up for allergies.    Allergy HPI  Allergies are a lot better, using less OTC meds.  This year a little more Nasonex using 2 sprasy each nostril daily.  Also Clalritin D. If no D gets swollen in the nose.  Left arm is mostly always quarter or less. Was hives on the arms once.   "     The patient was seen and examined by Vitaly Julien MD, MD   Current Outpatient Medications   Medication     amitriptyline (ELAVIL) 10 MG tablet     Cholecalciferol (VITAMIN D PO)     Creatine Monohydrate POWD     cyclobenzaprine (FLEXERIL) 5 MG tablet     dicyclomine (BENTYL) 10 MG capsule     DULoxetine (CYMBALTA) 60 MG capsule     Fish Oil-Cholecalciferol (FISH OIL + D3 PO)     hyoscyamine (LEVSIN/SL) 0.125 MG sublingual tablet     LORazepam (ATIVAN) 1 MG tablet     mirtazapine (REMERON) 7.5 MG tablet     ORDER FOR ALLERGEN IMMUNOTHERAPY     ORDER FOR ALLERGEN IMMUNOTHERAPY     prazosin (MINIPRESS) 1 MG capsule     sildenafil (VIAGRA) 100 MG tablet     SKYRIZI, 150 MG DOSE, 75 MG/0.83ML subcutaneous     study - emtricitabine-tenofovir 200-300, IDS#[id number], (TRUVADA) per tablet     Current Facility-Administered Medications   Medication     hydrocortisone (CORTAID) 1 % cream     Allergies   Allergen Reactions     Neosporin Af [Miconazole]      Adhesive Tape Rash     Topical creams: neosporin and bacitracin     Bacitracin Rash     Bacitracin-Polymyxin B Rash and GI Disturbance     No Clinical Screening - See Comments Rash     Topical creams: neosporin and bacitracin     Triple Antibiotic Rash     Social History     Socioeconomic History     Marital status: Single     Spouse name: Not on file     Number of children: Not on file     Years of education: Not on file     Highest education level: Not on file   Occupational History     Not on file   Social Needs     Financial resource strain: Not on file     Food insecurity     Worry: Not on file     Inability: Not on file     Transportation needs     Medical: Not on file     Non-medical: Not on file   Tobacco Use     Smoking status: Never Smoker     Smokeless tobacco: Never Used   Substance and Sexual Activity     Alcohol use: Yes     Comment: social     Drug use: No     Sexual activity: Not on file   Lifestyle     Physical activity     Days per week: Not on  file     Minutes per session: Not on file     Stress: Not on file   Relationships     Social connections     Talks on phone: Not on file     Gets together: Not on file     Attends [employer] service: Not on file     Active member of club or organization: Not on file     Attends meetings of clubs or organizations: Not on file     Relationship status: Not on file     Intimate partner violence     Fear of current or ex partner: Not on file     Emotionally abused: Not on file     Physically abused: Not on file     Forced sexual activity: Not on file   Other Topics Concern     Not on file   Social History Narrative     Not on file     Past Medical History:   Diagnosis Date     Prostatitis      Past Surgical History:   Procedure Laterality Date     COLONOSCOPY  10/2007     DENTAL SURGERY       Family History   Problem Relation Age of Onset     Pre-Diabetes Mother      Parkinsonism Maternal Grandmother      Heart Failure Maternal Grandmother      Arrhythmia Maternal Grandmother         on blood thinners     Cancer Maternal Grandmother         TOB, ? esophageal     Dementia Maternal Grandfather      Prostate Cancer Maternal Grandfather          ROS   A complete ROS was otherwise negative except as noted in the HPI.  There were no vitals taken for this visit.  Exam:   GENERAL APPEARANCE: Well developed, well nourished, alert, and in no apparent distress.  EYES: EOMI, conjunctiva clear non-injected  HENT: No dis harge  MOUTH: Oral mucosa is moist.  RESP: no cough or wheezing.  MS: Extremities normal. No clubbing. No cyanosis.  SKIN: No rashes noted.  NEURO: Speech normal, normal strength and tone.  PSYCH: Normal mentation, orientation to person, place, and time.  Results:      Assessment and plan:   He is doing well in regards to shots and allergies.  If swelling on left bigger than a quarter we may need to adjust the dose.  Continue Nasonex 2 sprays in each nostril daily and Calritin and occasionally claritin D    Follow up  in about 8-9 months.     Visit was 8 minutes via video and then we were disconnected so another 3 via phone.                Again, thank you for allowing me to participate in the care of your patient.        Sincerely,        Vitaly Julien MD

## 2020-06-16 ENCOUNTER — TELEPHONE (OUTPATIENT)
Dept: ALLERGY | Facility: CLINIC | Age: 35
End: 2020-06-16

## 2020-06-16 DIAGNOSIS — J30.1 SEASONAL ALLERGIC RHINITIS DUE TO POLLEN: ICD-10-CM

## 2020-06-16 DIAGNOSIS — J30.89 ALLERGIC RHINITIS CAUSED BY MOLD: ICD-10-CM

## 2020-07-02 ENCOUNTER — AMBULATORY - HEALTHEAST (OUTPATIENT)
Dept: FAMILY MEDICINE | Facility: CLINIC | Age: 35
End: 2020-07-02

## 2020-07-02 ENCOUNTER — VIRTUAL VISIT (OUTPATIENT)
Dept: FAMILY MEDICINE | Facility: OTHER | Age: 35
End: 2020-07-02
Payer: COMMERCIAL

## 2020-07-02 DIAGNOSIS — Z20.822 SUSPECTED COVID-19 VIRUS INFECTION: ICD-10-CM

## 2020-07-02 PROCEDURE — 99421 OL DIG E/M SVC 5-10 MIN: CPT | Performed by: PHYSICIAN ASSISTANT

## 2020-07-02 NOTE — PROGRESS NOTES
"Date: 2020 14:42:31  Clinician: Ankit Cervantes  Clinician NPI: [id number]  Patient: Juarez Jauregui  Patient : 1985  Patient Address: [address] [place] [address] [place] [address]  Patient Phone: [phone]  Visit Protocol: URI  Patient Summary:  Juarez is a 35 year old ( : 1985 ) male who initiated a Visit for COVID-19 (Coronavirus) evaluation and screening. When asked the question \"Please sign me up to receive news, health information and promotions from [employer].\", Juarez responded \"No\".    Juarez states his symptoms started 1-2 days ago.   His symptoms consist of nausea, diarrhea, malaise, ear pain, a headache, chills, a sore throat, enlarged lymph nodes, myalgia, anosmia, and facial pain or pressure. Juarez also feels feverish.   Symptom details     Sore throat: Juarez reports having mild throat pain (1-3 on a 10 point pain scale), does not have exudate on his tonsils, and can swallow liquids. The lymph nodes in his neck are enlarged. A rash has not appeared on the skin since the sore throat started.     Temperature: His current temperature is 99.8 degrees Fahrenheit.     Facial pain or pressure: The facial pain or pressure feels worse when bending over or leaning forward.     Headache: He states the headache is moderate (4-6 on a 10 point pain scale).      Juarez denies having wheezing, teeth pain, ageusia, vomiting, rhinitis, cough, and nasal congestion. He also denies having recent facial or sinus surgery in the past 60 days, taking antibiotic medication in the past month, and having a sinus infection within the past year. He is not experiencing dyspnea.   Precipitating events  Within the past week, Juarez has not been exposed to someone with strep throat. He has not recently been exposed to someone with influenza. Juarez has not been in close contact with any high risk individuals.   Pertinent COVID-19 (Coronavirus) information  In the past 14 days, Juarez has not worked in a congregate " living setting.   He does not work or volunteer as healthcare worker or a  and does not work or volunteer in a healthcare facility.   Juarez also has not lived in a congregate living setting in the past 14 days. He does not live with a healthcare worker.   Juarez has not had a close contact with a laboratory-confirmed COVID-19 patient within 14 days of symptom onset.   Pertinent medical history  Juarez does not need a return to work/school note.   Weight: 178 lbs   Juarez does not smoke or use smokeless tobacco.   Additional information as reported by the patient (free text): My roommate is a  for [employer] so I am concerned about exposure.   Weight: 178 lbs    MEDICATIONS: Nasonex nasal, zolpidem oral, lorazepam oral, Truvada oral, prazosin oral, duloxetine HCl (bulk), mirtazapine oral, ALLERGIES: Neosporin ([id number])  Clinician Response:  Dear Juarez,   Your symptoms show that you may have coronavirus (COVID-19). This illness can cause fever, cough and trouble breathing. Many people get a mild case and get better on their own. Some people can get very sick.  What should I do?  We would like to test you for this virus.   1. Please call [phone] to schedule your visit. Explain that you were referred by [facility] to have a COVID-19 test. Be ready to share your [facility] visit ID number.  The following will serve as your written order for this COVID Test, ordered by me, for the indication of suspected COVID [Z20.828]: The test will be ordered in [employer], our electronic health record, after you are scheduled. It will show as ordered and authorized by Gerardo Quintana MD.  Order: COVID-19 (Coronavirus) PCR for SYMPTOMATIC testing from [facility].      2. When it's time for your COVID test:  Stay at least 6 feet away from others. (If someone will drive you to your test, stay in the backseat, as far away from the  as you can.)   Cover your mouth and nose with a mask, tissue or washcloth.  Go  "straight to the testing site. Don't make any stops on the way there or back.      3.Starting now: Stay home and away from others (self-isolate) until:   You've had no fever---and no medicine that reduces fever---for 3 full days (72 hours). And...   Your other symptoms have gotten better. For example, your cough or breathing has improved. And...   At least 10 days have passed since your symptoms started.       During this time, don't leave the house except for testing or medical care.   Stay in your own room, even for meals. Use your own bathroom if you can.   Stay away from others in your home. No hugging, kissing or shaking hands. No visitors.  Don't go to work, school or anywhere else.    Clean \"high touch\" surfaces often (doorknobs, counters, handles, etc.). Use a household cleaning spray or wipes. You'll find a full list of  on the EPA website: www.epa.gov/pesticide-registration/list-n-disinfectants-use-against-sars-cov-2.   Cover your mouth and nose with a mask, tissue or washcloth to avoid spreading germs.  Wash your hands and face often. Use soap and water.  Caregivers in these groups are at risk for severe illness due to COVID-19:  o People 65 years and older  o People who live in a nursing home or long-term care facility  o People with chronic disease (lung, heart, cancer, diabetes, kidney, liver, immunologic)  o People who have a weakened immune system, including those who:   Are in cancer treatment  Take medicine that weakens the immune system, such as corticosteroids  Had a bone marrow or organ transplant  Have an immune deficiency  Have poorly controlled HIV or AIDS  Are obese (body mass index of 40 or higher)  Smoke regularly   o Caregivers should wear gloves while washing dishes, handling laundry and cleaning bedrooms and bathrooms.  o Use caution when washing and drying laundry: Don't shake dirty laundry, and use the warmest water setting that you can.  o For more tips, go to " www.cdc.gov/coronavirus/2019-ncov/downloads/10Things.pdf.    4.Sign up for [employer]. We know it's scary to hear that you might have COVID-19. We want to track your symptoms to make sure you're okay over the next 2 weeks. Please look for an email from [employer]---this is a free, online program that we'll use to keep in touch. To sign up, follow the link in the email. Learn more at [email or web address]  How can I take care of myself?   Get lots of rest. Drink extra fluids (unless a doctor has told you not to).   Take Tylenol (acetaminophen) for fever or pain. If you have liver or kidney problems, ask your family doctor if it's okay to take Tylenol.   Adults can take either:    650 mg (two 325 mg pills) every 4 to 6 hours, or...   1,000 mg (two 500 mg pills) every 8 hours as needed.    Note: Don't take more than 3,000 mg in one day. Acetaminophen is found in many medicines (both prescribed and over-the-counter medicines). Read all labels to be sure you don't take too much.   For children, check the Tylenol bottle for the right dose. The dose is based on the child's age or weight.    If you have other health problems (like cancer, heart failure, an organ transplant or severe kidney disease): Call your specialty clinic if you don't feel better in the next 2 days.       Know when to call 911. Emergency warning signs include:    Trouble breathing or shortness of breath Pain or pressure in the chest that doesn't go away Feeling confused like you haven't felt before, or not being able to wake up Bluish-colored lips or face.  Where can I get more information?    [employer] -- About COVID-19: [email or web address]   CDC -- What to Do If You're Sick: www.cdc.gov/coronavirus/2019-ncov/about/steps-when-sick.html   CDC -- Ending Home Isolation: www.cdc.gov/coronavirus/2019-ncov/hcp/disposition-in-home-patients.html   CDC -- Caring for Someone:  www.cdc.gov/coronavirus/2019-ncov/if-you-are-sick/care-for-someone.html   [facility] -- Interim Guidance for Hospital Discharge to Home: www.health.[facility].[place]/diseases/coronavirus/hcp/hospdischarge.pdf   [facility] clinical trials (COVID-19 research studies): clinicalaffairs.[facility]-clinical-trials    Below are the COVID-19 hotlines at the [place] Department of Health ([facility]). Interpreters are available.    For health questions: Call 573-090-2681 or 1-788.543.6903 (7 a.m. to 7 p.m.) For questions about schools and childcare: Call 642-431-1809 or 1-629.954.4463 (7 a.m. to 7 p.m.)    Diagnosis: Diarrhea, unspecified  Diagnosis ICD: R19.7

## 2020-07-03 ENCOUNTER — AMBULATORY - HEALTHEAST (OUTPATIENT)
Dept: FAMILY MEDICINE | Facility: CLINIC | Age: 35
End: 2020-07-03

## 2020-07-03 DIAGNOSIS — Z20.822 SUSPECTED COVID-19 VIRUS INFECTION: ICD-10-CM

## 2020-07-07 ENCOUNTER — COMMUNICATION - HEALTHEAST (OUTPATIENT)
Dept: FAMILY MEDICINE | Facility: CLINIC | Age: 35
End: 2020-07-07

## 2020-08-28 ENCOUNTER — VIRTUAL VISIT (OUTPATIENT)
Dept: FAMILY MEDICINE | Facility: CLINIC | Age: 35
End: 2020-08-28
Payer: COMMERCIAL

## 2020-08-28 VITALS — BODY MASS INDEX: 25.05 KG/M2 | WEIGHT: 175 LBS | HEIGHT: 70 IN

## 2020-08-28 DIAGNOSIS — G47.00 INSOMNIA, UNSPECIFIED TYPE: Primary | ICD-10-CM

## 2020-08-28 DIAGNOSIS — F43.10 PTSD (POST-TRAUMATIC STRESS DISORDER): ICD-10-CM

## 2020-08-28 RX ORDER — MULTIPLE VITAMINS W/ MINERALS TAB 9MG-400MCG
1 TAB ORAL DAILY
COMMUNITY
End: 2023-12-13

## 2020-08-28 ASSESSMENT — MIFFLIN-ST. JEOR: SCORE: 1735.04
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
## 2020-09-01 ENCOUNTER — TELEPHONE (OUTPATIENT)
Dept: ALLERGY | Facility: CLINIC | Age: 35
End: 2020-09-01

## 2020-09-01 NOTE — TELEPHONE ENCOUNTER
[facility] Call Center    Phone Message    May a detailed message be left on voicemail: yes     Reason for Call: Other: The pt has been waiting to hear from the clinic about getting his immunization files up to date. Please call the pt to discuss. Thanks.     Action Taken: Message routed to:  [facility]    Travel Screening: Not Applicable

## 2020-09-03 ENCOUNTER — TELEPHONE (OUTPATIENT)
Dept: PULMONOLOGY | Facility: CLINIC | Age: 35
End: 2020-09-03

## 2020-09-03 ENCOUNTER — OFFICE VISIT (OUTPATIENT)
Dept: FAMILY MEDICINE | Facility: CLINIC | Age: 35
End: 2020-09-03
Payer: COMMERCIAL

## 2020-09-03 VITALS
SYSTOLIC BLOOD PRESSURE: 121 MMHG | RESPIRATION RATE: 15 BRPM | BODY MASS INDEX: 25.76 KG/M2 | HEIGHT: 71 IN | WEIGHT: 184 LBS | HEART RATE: 105 BPM | DIASTOLIC BLOOD PRESSURE: 85 MMHG | TEMPERATURE: 97.2 F | OXYGEN SATURATION: 96 %

## 2020-09-03 DIAGNOSIS — F43.9 TRAUMA AND STRESSOR-RELATED DISORDER: ICD-10-CM

## 2020-09-03 SDOH — HEALTH STABILITY: MENTAL HEALTH: HOW OFTEN DO YOU HAVE 6 OR MORE DRINKS ON ONE OCCASION?: LESS THAN MONTHLY

## 2020-09-03 SDOH — HEALTH STABILITY: MENTAL HEALTH: HOW OFTEN DO YOU HAVE A DRINK CONTAINING ALCOHOL?: 2-3 TIMES A WEEK

## 2020-09-03 SDOH — HEALTH STABILITY: MENTAL HEALTH: HOW MANY STANDARD DRINKS CONTAINING ALCOHOL DO YOU HAVE ON A TYPICAL DAY?: 1 OR 2

## 2020-09-03 ASSESSMENT — MIFFLIN-ST. JEOR: SCORE: 1791.75

## 2020-09-03 NOTE — TELEPHONE ENCOUNTER
Detail Level: Zone
[facility] Call Center    Phone Message    May a detailed message be left on voicemail: yes     Reason for Call: Other: Pt calling in returning missed call from Jaqueline to discuss getting his immunization files up to date. Please call the pt to discuss. Thank you     Action Taken: Message routed to:  [facility] Allergy     Travel Screening: Not Applicable                                                                      
Patient wondering if new serum has arrived to clinic. Will check on serum and return patient's call.   
Detail Level: Generalized

## 2020-09-03 NOTE — NURSING NOTE
"  35 year old  Chief Complaint   Patient presents with     Sleep Problem     ongoing x 1.5 years,        Blood pressure 121/85, pulse 105, temperature 97.2  F (36.2  C), temperature source Skin, resp. rate 15, height 1.803 m (5' 11\"), weight 83.5 kg (184 lb), SpO2 96 %. Body mass index is 25.66 kg/m .  Patient Active Problem List   Diagnosis     Strain of neck muscle, initial encounter     Acute recurrent maxillary sinusitis     Anxiety     History of migraine     Chronic seasonal allergic rhinitis     Neck pain     Upper back pain     Cervicalgia     Recurrent major depressive disorder, in partial remission (H)       Wt Readings from Last 2 Encounters:   09/03/20 83.5 kg (184 lb)   08/28/20 79.4 kg (175 lb)     BP Readings from Last 3 Encounters:   09/03/20 121/85   03/03/20 124/84   01/24/20 121/85         Current Outpatient Medications   Medication     Cholecalciferol (VITAMIN D PO)     Creatine Monohydrate POWD     dicyclomine (BENTYL) 10 MG capsule     DULoxetine (CYMBALTA) 60 MG capsule     hyoscyamine (LEVSIN/SL) 0.125 MG sublingual tablet     LORazepam (ATIVAN) 1 MG tablet     mirtazapine (REMERON) 7.5 MG tablet     multivitamin w/minerals (THERA-VIT-M) tablet     ORDER FOR ALLERGEN IMMUNOTHERAPY     ORDER FOR ALLERGEN IMMUNOTHERAPY     prazosin (MINIPRESS) 1 MG capsule     sildenafil (VIAGRA) 100 MG tablet     SKYRIZI, 150 MG DOSE, 75 MG/0.83ML subcutaneous     study - emtricitabine-tenofovir 200-300, IDS#[id number], (TRUVADA) per tablet     Current Facility-Administered Medications   Medication     hydrocortisone (CORTAID) 1 % cream       Social History     Tobacco Use     Smoking status: Never Smoker     Smokeless tobacco: Never Used   Substance Use Topics     Alcohol use: Yes     Frequency: 2-3 times a week     Drinks per session: 1 or 2     Binge frequency: Less than monthly     Comment: social     Drug use: No       Health Maintenance Due   Topic Date Due     HIV SCREENING  06/20/2000     PREVENTIVE CARE " VISIT  03/20/2019     INFLUENZA VACCINE (1) 09/01/2020       No results found for: PAP      September 3, 2020 10:59 AM

## 2020-09-05 PROBLEM — F43.9 TRAUMA AND STRESSOR-RELATED DISORDER: Status: ACTIVE | Noted: 2020-09-05

## 2020-09-05 RX ORDER — PRAZOSIN HYDROCHLORIDE 5 MG/1
CAPSULE ORAL
COMMUNITY
Start: 2020-09-05 | End: 2021-03-17

## 2020-09-05 RX ORDER — MIRTAZAPINE 30 MG/1
TABLET, FILM COATED ORAL
Qty: 90 TABLET | Refills: 0 | COMMUNITY
Start: 2020-09-05 | End: 2021-03-17

## 2020-09-08 ENCOUNTER — TRANSFERRED RECORDS (OUTPATIENT)
Dept: HEALTH INFORMATION MANAGEMENT | Facility: CLINIC | Age: 35
End: 2020-09-08

## 2020-09-10 ENCOUNTER — ALLIED HEALTH/NURSE VISIT (OUTPATIENT)
Dept: ALLERGY | Facility: CLINIC | Age: 35
End: 2020-09-10
Payer: COMMERCIAL

## 2020-09-10 DIAGNOSIS — Z51.6 NEED FOR DESENSITIZATION TO ALLERGENS: Primary | ICD-10-CM

## 2020-09-10 NOTE — PROGRESS NOTES
Patient came in for 2 Allergy injection. Patient had no reaction to last shots. I gave 0.05 ml of the red bottle 0.05 ml subcutaneous without any issues.   Patient instructed to wait in building for at least 30 min. after injections  Patients comes into clinic today at the request of Dr. Dickey for allergy injections     No reaction to last injection.     This service provided today was under the direct supervision of Dr. Lazcano , who was available if needed.     MICHELE HOLDEN LPN

## 2020-09-18 ENCOUNTER — ALLIED HEALTH/NURSE VISIT (OUTPATIENT)
Dept: ALLERGY | Facility: CLINIC | Age: 35
End: 2020-09-18
Payer: COMMERCIAL

## 2020-09-18 DIAGNOSIS — Z51.6 NEED FOR DESENSITIZATION TO ALLERGENS: Primary | ICD-10-CM

## 2020-09-18 NOTE — PROGRESS NOTES
Patient came in for 2 allergy injection. Patient had no reaction to last shots. I gave 0.1 ml of the red bottle x2 subcutaneous without any issues.   Patient instructed to wait in building for at least 30 min. after injections  Patients comes into clinic today at the request of Dr. Dickey for allergy injections     No reaction to last injection.     This service provided today was under the direct supervision of Dr. Dickey, who was available if needed.     Jaqueline Clinton RN

## 2020-09-25 ENCOUNTER — ALLIED HEALTH/NURSE VISIT (OUTPATIENT)
Dept: ALLERGY | Facility: CLINIC | Age: 35
End: 2020-09-25
Payer: COMMERCIAL

## 2020-09-25 DIAGNOSIS — Z51.6 NEED FOR DESENSITIZATION TO ALLERGENS: Primary | ICD-10-CM

## 2020-09-25 NOTE — PROGRESS NOTES
Patient came in for 2 allergy injections. Patient had no reaction to last shots. I gave 0.2 ml of the red bottle x2 subcutaneous without any issues.   Patient instructed to wait in building for at least 30 min. after injections  Patients comes into clinic today at the request of Dr. Dickey for allergy injections     No reaction to last injection.     This service provided today was under the direct supervision of Dr. Dickey, who was available if needed.     Jaqueline Clinton RN

## 2020-10-02 ENCOUNTER — ALLIED HEALTH/NURSE VISIT (OUTPATIENT)
Dept: ALLERGY | Facility: CLINIC | Age: 35
End: 2020-10-02
Payer: COMMERCIAL

## 2020-10-02 DIAGNOSIS — Z51.6 NEED FOR DESENSITIZATION TO ALLERGENS: Primary | ICD-10-CM

## 2020-10-02 PROCEDURE — 99207 PR NO CHARGE NURSE ONLY: CPT | Performed by: DERMATOLOGY

## 2020-10-02 PROCEDURE — 95125 IMMUNOTHERAPY 2/> INJECTIONS: CPT | Performed by: DERMATOLOGY

## 2020-10-02 NOTE — PROGRESS NOTES
Patient came in for 2 Allergy injection. Patient had no reaction to last shots. I gave 0.3 ml of the red bottle and 0.3 ml of red bottle  subcutaneous without any issues.   Patient instructed to wait in building for at least 30 min. after injections  Patients comes into clinic today at the request of Dr. Dickey for allergy injections     No reaction to last injection.     This service provided today was under the direct supervision of Dr. Dickey, who was available if needed.     MICHELE HOLDEN LPN

## 2020-10-09 ENCOUNTER — ALLIED HEALTH/NURSE VISIT (OUTPATIENT)
Dept: ALLERGY | Facility: CLINIC | Age: 35
End: 2020-10-09
Payer: COMMERCIAL

## 2020-10-09 DIAGNOSIS — Z51.6 NEED FOR DESENSITIZATION TO ALLERGENS: Primary | ICD-10-CM

## 2020-10-09 PROCEDURE — 95125 IMMUNOTHERAPY 2/> INJECTIONS: CPT | Performed by: DERMATOLOGY

## 2020-10-09 PROCEDURE — 99207 PR NO CHARGE NURSE ONLY: CPT | Performed by: DERMATOLOGY

## 2020-10-09 NOTE — PROGRESS NOTES
Patient came in for 2 Allergy injection. Patient had no reaction to last shots. I gave 0.4 ml of the red bottle, and 0.4 ml of red bottle subcutaneous without any issues.   Patient instructed to wait in building for at least 30 min. after injections  Patients comes into clinic today at the request of Dr Dickey for allergy injections     No reaction to last injection.     This service provided today was under the direct supervision of Dr. Dickey , who was available if needed.     MICHELE HOLDEN LPN

## 2020-10-19 ENCOUNTER — TRANSFERRED RECORDS (OUTPATIENT)
Dept: HEALTH INFORMATION MANAGEMENT | Facility: CLINIC | Age: 35
End: 2020-10-19

## 2020-10-20 ENCOUNTER — ALLIED HEALTH/NURSE VISIT (OUTPATIENT)
Dept: ALLERGY | Facility: CLINIC | Age: 35
End: 2020-10-20
Payer: COMMERCIAL

## 2020-10-20 DIAGNOSIS — Z51.6 NEED FOR DESENSITIZATION TO ALLERGENS: Primary | ICD-10-CM

## 2020-10-20 PROCEDURE — 95125 IMMUNOTHERAPY 2/> INJECTIONS: CPT | Performed by: DERMATOLOGY

## 2020-10-20 PROCEDURE — 99207 PR NO CHARGE NURSE ONLY: CPT | Performed by: DERMATOLOGY

## 2020-10-20 NOTE — PROGRESS NOTES
Patient came in for 2 Allergy injection. Patient had no reaction to last shots. I gave 0.4 ml of the red bottle, and 0.4 ml of red bottle  subcutaneous without any issues.   Patient instructed to wait in building for at least 30 min. after injections  Patients comes into clinic today at the request of Dr. Dickey  for allergy injections     No reaction to last injection.     This service provided today was under the direct supervision of Dr. Dickey, who was available if needed.     MICHELE HOLDEN LPN

## 2020-10-30 ENCOUNTER — ALLIED HEALTH/NURSE VISIT (OUTPATIENT)
Dept: ALLERGY | Facility: CLINIC | Age: 35
End: 2020-10-30
Payer: COMMERCIAL

## 2020-10-30 DIAGNOSIS — Z51.6 NEED FOR DESENSITIZATION TO ALLERGENS: Primary | ICD-10-CM

## 2020-10-30 PROCEDURE — 99207 PR NO CHARGE NURSE ONLY: CPT | Performed by: DERMATOLOGY

## 2020-10-30 PROCEDURE — 95125 IMMUNOTHERAPY 2/> INJECTIONS: CPT | Performed by: DERMATOLOGY

## 2020-10-30 NOTE — PROGRESS NOTES
Patient came in for 2 Allergy injection. Patient had no reaction to last shots. I gave 0.5 ml of the red bottle, and 0.5 ml of red bottle  subcutaneous without any issues.   Patient instructed to wait in building for at least 30 min. after injections  Patients comes into clinic today at the request of Dr. Dickey for allergy injections     No reaction to last injection.     This service provided today was under the direct supervision of Dr. Dickey  , who was available if needed.     MICHELE HOLDEN LPN

## 2020-12-01 ENCOUNTER — ALLIED HEALTH/NURSE VISIT (OUTPATIENT)
Dept: ALLERGY | Facility: CLINIC | Age: 35
End: 2020-12-01
Payer: COMMERCIAL

## 2020-12-01 DIAGNOSIS — Z51.6 NEED FOR DESENSITIZATION TO ALLERGENS: Primary | ICD-10-CM

## 2020-12-01 PROCEDURE — 99207 PR NO CHARGE NURSE ONLY: CPT | Performed by: DERMATOLOGY

## 2020-12-01 PROCEDURE — 95125 IMMUNOTHERAPY 2/> INJECTIONS: CPT | Performed by: DERMATOLOGY

## 2020-12-15 ENCOUNTER — ALLIED HEALTH/NURSE VISIT (OUTPATIENT)
Dept: ALLERGY | Facility: CLINIC | Age: 35
End: 2020-12-15
Payer: COMMERCIAL

## 2020-12-15 DIAGNOSIS — Z51.6 NEED FOR DESENSITIZATION TO ALLERGENS: Primary | ICD-10-CM

## 2020-12-15 PROCEDURE — 95125 IMMUNOTHERAPY 2/> INJECTIONS: CPT | Performed by: DERMATOLOGY

## 2020-12-15 PROCEDURE — 99207 PR NO CHARGE NURSE ONLY: CPT | Performed by: DERMATOLOGY

## 2021-01-04 ENCOUNTER — TELEPHONE (OUTPATIENT)
Dept: ALLERGY | Facility: CLINIC | Age: 36
End: 2021-01-04

## 2021-01-04 NOTE — TELEPHONE ENCOUNTER
[facility] Call Center    Phone Message    May a detailed message be left on voicemail: yes     Reason for Call: Other: Please transfer Pt Allergy Serum to [facility] location - he is scheduling there from now on and needs it transferred over there before his Appt due mid January for his once a month injection he had here last on 12/15/20 - Thanks - call Pt if any questions - otherwise just transfer it to [facility]     Action Taken: Message routed to:  [facility] Allergy    Travel Screening: Not Applicable

## 2021-01-18 NOTE — TELEPHONE ENCOUNTER
Patient is scheduled for an appointment at the [facility] on 01-.  Allergy serums have not arrived.    RN called patient to cancel allergy shot appointment.    RN left message for patient to return call to [phone].    [place] CISNEROS RN

## 2021-01-19 ENCOUNTER — TELEPHONE (OUTPATIENT)
Dept: ALLERGY | Facility: CLINIC | Age: 36
End: 2021-01-19

## 2021-01-19 NOTE — TELEPHONE ENCOUNTER
Patient does not require epinephrine auto-injector or to pre-medicate prior to immunotherapy. He should have a visit to establish care and this can be scheduled on the same day as his initial injection visit.

## 2021-01-19 NOTE — TELEPHONE ENCOUNTER
My chart message sent to patient advising him that an appointment was made for him with Dr. Fiore.    [place] CISNEROS RN

## 2021-01-19 NOTE — TELEPHONE ENCOUNTER
Patient is transferring allergy immunotherapy treatment from the [facility] to [place]. Please advise if you would like patient to have an appointment to establish care with you. Also please advise if patient should carry an EpiPen, premedicate or any other allergy immunotherapy instructions that may be needed.    Fariba PEREZ MA

## 2021-01-19 NOTE — TELEPHONE ENCOUNTER
Patient is rescheduled for allergy injections on the [facility] allergy clinic on 1/26/2021. Please transfer patient's allergy serums and paper flowsheets to [facility].    Fariba PEREZ MA

## 2021-01-21 NOTE — TELEPHONE ENCOUNTER
Allergy serums received at [place] from the [facility] clinic.    [place] CSINEROS RN

## 2021-01-21 NOTE — TELEPHONE ENCOUNTER
RN left message on patient's voicemail (per AMIE 03-) stating that patient needs to see Dr. Fiore the allergist on site before receiving his allergy shots.  Appointment time with Dr. Fiore is the same time patient is currently scheduled for allergy shots.    Virginia CISNEROS RN

## 2021-01-21 NOTE — TELEPHONE ENCOUNTER
Allergy serums being delivered today 01/21/2021 to [facility]. Injection schedule and serums sent/delivered with [employer].     Carmen Paniagua RN

## 2021-01-26 ENCOUNTER — TELEPHONE (OUTPATIENT)
Dept: ALLERGY | Facility: CLINIC | Age: 36
End: 2021-01-26

## 2021-01-26 ENCOUNTER — OFFICE VISIT (OUTPATIENT)
Dept: ALLERGY | Facility: CLINIC | Age: 36
End: 2021-01-26
Payer: COMMERCIAL

## 2021-01-26 ENCOUNTER — ALLIED HEALTH/NURSE VISIT (OUTPATIENT)
Dept: ALLERGY | Facility: CLINIC | Age: 36
End: 2021-01-26
Payer: COMMERCIAL

## 2021-01-26 VITALS — DIASTOLIC BLOOD PRESSURE: 74 MMHG | WEIGHT: 184 LBS | BODY MASS INDEX: 25.66 KG/M2 | SYSTOLIC BLOOD PRESSURE: 128 MMHG

## 2021-01-26 DIAGNOSIS — J30.81 ALLERGIC RHINITIS DUE TO ANIMALS: Primary | ICD-10-CM

## 2021-01-26 DIAGNOSIS — J34.2 NASAL SEPTAL DEVIATION: ICD-10-CM

## 2021-01-26 DIAGNOSIS — Z51.6 NEED FOR DESENSITIZATION TO ALLERGENS: Primary | ICD-10-CM

## 2021-01-26 DIAGNOSIS — J30.1 SEASONAL ALLERGIC RHINITIS DUE TO POLLEN: ICD-10-CM

## 2021-01-26 DIAGNOSIS — J30.89 ALLERGIC RHINITIS DUE TO MOLD: ICD-10-CM

## 2021-01-26 DIAGNOSIS — J30.81 ALLERGIC RHINITIS DUE TO ANIMALS: ICD-10-CM

## 2021-01-26 DIAGNOSIS — J30.1 SEASONAL ALLERGIC RHINITIS DUE TO POLLEN: Primary | ICD-10-CM

## 2021-01-26 PROCEDURE — 99207 PR NON-BILLABLE SERV PER CHARTING: CPT

## 2021-01-26 PROCEDURE — 99203 OFFICE O/P NEW LOW 30 MIN: CPT | Performed by: ALLERGY & IMMUNOLOGY

## 2021-01-26 NOTE — TELEPHONE ENCOUNTER
Reason for call:  Other   Patient called regarding (reason for call): appointment  Additional comments: Patient needs to reschedule his 2/1 allergy injection. Please call to reschedule.     Phone number to reach patient:  Cell number on file:    Telephone Information:   Mobile [phone]       Best Time:  Any time    Can we leave a detailed message on this number?  YES    Travel screening: Not Applicable

## 2021-01-26 NOTE — TELEPHONE ENCOUNTER
RN returned patient call.     Rescheduled patient's allergy shot appointment.    Virginia CISNEROS RN

## 2021-01-26 NOTE — LETTER
1/26/2021         RE: Juarez Jauregui  [address]  [place] [address]        Dear Colleague,    Thank you for referring your patient, Juarez Jauregui, to the [facility]. Please see a copy of my visit note below.    Juarez Jauregui was seen in the Allergy Clinic at [facility].    Juarez Jauregui is a 35 year old White male being seen today in consultation for allergic rhinitis. He began allergen immunotherapy treatment in 2/2019 and reached his maintenance dose in 5/2019. He has had significant local reactions with swelling but no history of systemic reactions. Ricardo initially presented for evaluation due to a history of frequent sinus infections. He states he was being treated for about 6 sinus infections per year for the past 6 years. He saw Dr. Ybarra who ordered a sinus CT and found a deviated septum. He was referred to Dr. Messina for further evaluation of allergies and consideration of medical management prior to pursuing surgery.    Ricardo feels that he has had improvement with immunotherapy treatment. He has not needed to use nasal steroids since starting immunotherapy however he has also sold his home and moved in with a friend. He no longer has to do any yard work which often exacerbated his symptoms. His allergy symptoms are present throughout the spring, summer, and fall months and include recurrent sinus infections and nasal congestion. Ricardo inquired today about the duration of immunotherapy treatment as well as possible surgical management of his deviated septum.    CT Facial Bones 8/29/18:  Findings:   Maxillary sinuses: Unchanged small mucous retention cyst within the  anterior left maxillary antrum. Right maxillary antrum is clear..  Sphenoid sinus: Unchanged minimal mucosal thickening at the anterior  left sphenoid sinus.  Frontal sinus: Clear..  Ethmoid air cells: Unchanged minimal scattered mucosal thickening.     The ostiomeatal units  appear patent bilaterally. The bony walls of the  paranasal sinuses are intact. Mild leftward deviation of the inferior  nasal septum with a leftward pointing nasal septal spur is unchanged.  More superiorly, the nasal septum is bowed to the right.     Normal retromaxillary and pterygopalatine fat.     The adenoid tonsils in the nasopharynx are unremarkable.                                                                      Impression:     1. Unchanged CT sinus compared to 5/7/2018.  2. No evidence of sinusitis.       CT Maxillofacial 5/7/18:  Findings:     Maxillary sinuses: clear bilaterally.    Frontal sinuses: clear bilaterally.    Ethmoid air cells: clear bilaterally.   Sphenoid sinus: clear.      Ostiomeatal units appear patent bilaterally. The bony walls of the  paranasal sinuses are intact.   The adenoid tonsils in the nasopharynx  are unremarkable.                                                                      Impression:    No evidence of sinusitis.        Past Medical History:   Diagnosis Date     Prostatitis      Family History   Problem Relation Age of Onset     Pre-Diabetes Mother      Parkinsonism Maternal Grandmother      Heart Failure Maternal Grandmother      Arrhythmia Maternal Grandmother         on blood thinners     Cancer Maternal Grandmother         TOB, ? esophageal     Dementia Maternal Grandfather      Prostate Cancer Maternal Grandfather      Past Surgical History:   Procedure Laterality Date     COLONOSCOPY  10/2007     DENTAL SURGERY         ENVIRONMENTAL HISTORY: [place]. The home is heated with a forced air. They do have central air conditioning. The patient's bedroom is furnished with carpeting in bedroom and allergen mattress cover.  Pets inside the house include 1 dog(s). There is no history of cockroach or mice infestation. There is/are 0 smokers living in the house.  There is/are 0 who smoke ecigarettes/vape living in the  house.The house does not have a damp basement.     SOCIAL HISTORY:   Juarez is employed as . He lives with his roomate.  His roomate works as a/an .    REVIEW OF SYSTEMS:  General: negative for weight gain. negative for weight loss. negative for changes in sleep.   Eyes: negative for itching. negative for redness. negative for tearing/watering. negative for vision changes  Ears: negative for fullness. negative for hearing loss. negative for dizziness.   Nose: negative for snoring.negative for changes in smell. negative for drainage.   Throat: negative for hoarseness. negative for sore throat. negative for trouble swallowing.   Lungs: negative for cough. negative for shortness of breath.negative for wheezing. negative for sputum production.   Cardiovascular: negative for chest pain. negative for swelling of ankles. negative for fast or irregular heartbeat.   Gastrointestinal: negative for nausea. negative for heartburn. negative for acid reflux.   Musculoskeletal: negative for joint pain. negative for joint stiffness. negative for joint swelling.   Neurologic: negative for seizures. negative for fainting. negative for weakness.   Psychiatric: negative for changes in mood. negative for anxiety.   Endocrine: negative for cold intolerance. negative for heat intolerance. negative for tremors.   Hematologic: negative for easy bruising. negative for easy bleeding.  Integumentary: negative for rash. negative for scaling. negative for nail changes.       Current Outpatient Medications:      Cholecalciferol (VITAMIN D PO), , Disp: , Rfl:      Creatine Monohydrate POWD, 1 scoop daily into protein shake, Disp: 454 g, Rfl: 11     dicyclomine (BENTYL) 10 MG capsule, Take 10 mg by mouth, Disp: , Rfl:      DULoxetine (CYMBALTA) 60 MG capsule, Take 120mg daily--follows with psychiatry, Disp: , Rfl:      hyoscyamine (LEVSIN/SL) 0.125 MG sublingual tablet, Place 1 tablet (0.125 mg) under the tongue every 4  hours as needed for cramping, Disp: 30 tablet, Rfl: 11     LORazepam (ATIVAN) 1 MG tablet, Take 1 tablet (1 mg) by mouth every 8 hours as needed for anxiety, Disp: 20 tablet, Rfl: 0     mirtazapine (REMERON) 30 MG tablet, Take 1 at bedtime, follows with psychiatrist, Disp: 90 tablet, Rfl: 0     multivitamin w/minerals (THERA-VIT-M) tablet, Take 1 tablet by mouth daily, Disp: , Rfl:      prazosin (MINIPRESS) 5 MG capsule, Take 1 at bedtime, follows with psychiatry, Disp: , Rfl:      sildenafil (VIAGRA) 100 MG tablet, Take 1 tablet (100 mg) by mouth daily as needed (ED), Disp: 6 tablet, Rfl: 3     SKYRIZI, 150 MG DOSE, 75 MG/0.83ML subcutaneous, , Disp: , Rfl:      study - emtricitabine-tenofovir 200-300, IDS#[id number], (TRUVADA) per tablet, Take 1 tablet by mouth daily, Disp: 30 tablet, Rfl:      ORDER FOR ALLERGEN IMMUNOTHERAPY, Name of Mix: Mix #1  Cat, Grass, Ragweed, Tree  Cat Hair, Standardized 10,000 BAU/mL, ALK  0.5 ml  Birch Mix PRW 1:20 w/v, HS  0.3 ml Cedar, Red 1:20 w/v, HS  0.3 ml Toño Grass (Std) 100,000 BAU/mL, HS 0.3 ml Ragweed Mixed 1:20 w/v ALK  0.3 ml Diluent: HSA qs to 5ml, Disp: 5 mL, Rfl: PRN     ORDER FOR ALLERGEN IMMUNOTHERAPY, Name of Mix: Mix #2  Mold Alternaria Tenuis 1:10 w/v, HS  0.2 ml Diluent: HSA qs to 5ml, Disp: 5 mL, Rfl: PRN    Current Facility-Administered Medications:      hydrocortisone (CORTAID) 1 % cream, , Topical, BID, Vitaly Messina MD  Immunization History   Administered Date(s) Administered     DTAP (<7y) 1985, 1985, 01/07/1986, 03/27/1987, 03/16/1988     Hep B, Peds or Adolescent 03/18/1988, 05/11/1988, 10/22/1988     HepA-Adult 03/20/2018, 10/15/2018     MMR 1985, 03/12/1987, 03/18/1988     Meningococcal (Menactra ) 08/10/2004     Pneumococcal (PCV 7) 1985, 05/22/1987, 06/21/2008     TDAP Vaccine (Boostrix) 09/23/2015     Allergies   Allergen Reactions     Neosporin Af [Miconazole]      Adhesive Tape Rash     Topical creams: neosporin and  bacitracin     Bacitracin Rash     Bacitracin-Polymyxin B Rash and GI Disturbance     No Clinical Screening - See Comments Rash     Topical creams: neosporin and bacitracin     Triple Antibiotic Rash         EXAM:   /74 (BP Location: Right arm, Patient Position: Sitting, Cuff Size: Adult Regular)   Wt 83.5 kg (184 lb)   BMI 25.66 kg/m    GENERAL APPEARANCE: alert, cooperative and not in distress  SKIN: no rashes, no lesions  HEAD: atraumatic, normocephalic  EYES: lids and lashes normal, conjunctivae and sclerae clear, EOM full and intact  ENT: no scars or lesions, nasal exam showed nasal septal deviation, otoscopy showed external auditory canals clear, tympanic membranes normal, tongue midline and normal, soft palate, uvula, and tonsils normal  NECK: no asymmetry, masses, or scars, supple without significant adenopathy  LUNGS: unlabored respirations, no intercostal retractions or accessory muscle use, clear to auscultation without rales or wheezes  HEART: regular rate and rhythm without murmurs and normal S1 and S2  MUSCULOSKELETAL: no musculoskeletal defects are noted  NEURO: no focal deficits noted  PSYCH: does not appear depressed or anxious    WORKUP: None    ASSESSMENT/PLAN:  Juarez Jauregui is a 35 year old male seen for evaluation of allergies.    1. Allergic rhinitis due to pollen, animals, and mold - Ricardo has completed nearly 2 years of allergen immunotherapy treatment at his maintenance dose. He reports significant improvement in his symptoms but continues to notice some nasal blockage.  He has not needed to take antihistamines or use nasal steroids over the past year and he has not had a sinus infection since beginning immunotherapy treatment. We reviewed the risks, benefits, and recommend duration of immunotherapy treatment.    - continue allergen immunotherapy treatment per protocol to complete 3-5 years at the maintenance dose    2. Nasal septal deviation - Ricardo reports some persistent nasal  blockage despite improvement in his other symptoms since initiating allergen immunotherapy treatment. He is interested in pursuing possible surgical correction.    - OTOLARYNGOLOGY REFERRAL      Follow-up in 1 year, sooner if needed      Thank you for allowing me to participate in the care of Juarez Jauregui.      Jennifer Fiore MD, [facility]  Allergy/Immunology  [facility]      Chart documentation done in part with Dragon Voice Recognition Software. Although reviewed after completion, some word and grammatical errors may remain.      Again, thank you for allowing me to participate in the care of your patient.        Sincerely,        Jennifer Fiore MD

## 2021-01-26 NOTE — PROGRESS NOTES
Juarez Jauregui was seen in the Allergy Clinic at [facility].    Juarez Jauregui is a 35 year old White male being seen today in consultation for allergic rhinitis. He began allergen immunotherapy treatment in 2/2019 and reached his maintenance dose in 5/2019. He has had significant local reactions with swelling but no history of systemic reactions. Ricardo initially presented for evaluation due to a history of frequent sinus infections. He states he was being treated for about 6 sinus infections per year for the past 6 years. He saw Dr. Ybarra who ordered a sinus CT and found a deviated septum. He was referred to Dr. Messina for further evaluation of allergies and consideration of medical management prior to pursuing surgery.    Ricardo feels that he has had improvement with immunotherapy treatment. He has not needed to use nasal steroids since starting immunotherapy however he has also sold his home and moved in with a friend. He no longer has to do any yard work which often exacerbated his symptoms. His allergy symptoms are present throughout the spring, summer, and fall months and include recurrent sinus infections and nasal congestion. Ricardo inquired today about the duration of immunotherapy treatment as well as possible surgical management of his deviated septum.    CT Facial Bones 8/29/18:  Findings:   Maxillary sinuses: Unchanged small mucous retention cyst within the  anterior left maxillary antrum. Right maxillary antrum is clear..  Sphenoid sinus: Unchanged minimal mucosal thickening at the anterior  left sphenoid sinus.  Frontal sinus: Clear..  Ethmoid air cells: Unchanged minimal scattered mucosal thickening.     The ostiomeatal units appear patent bilaterally. The bony walls of the  paranasal sinuses are intact. Mild leftward deviation of the inferior  nasal septum with a leftward pointing nasal septal spur is unchanged.  More superiorly, the nasal septum is bowed to the right.     Normal  retromaxillary and pterygopalatine fat.     The adenoid tonsils in the nasopharynx are unremarkable.                                                                      Impression:     1. Unchanged CT sinus compared to 5/7/2018.  2. No evidence of sinusitis.       CT Maxillofacial 5/7/18:  Findings:     Maxillary sinuses: clear bilaterally.    Frontal sinuses: clear bilaterally.    Ethmoid air cells: clear bilaterally.   Sphenoid sinus: clear.      Ostiomeatal units appear patent bilaterally. The bony walls of the  paranasal sinuses are intact.   The adenoid tonsils in the nasopharynx  are unremarkable.                                                                      Impression:    No evidence of sinusitis.        Past Medical History:   Diagnosis Date     Prostatitis      Family History   Problem Relation Age of Onset     Pre-Diabetes Mother      Parkinsonism Maternal Grandmother      Heart Failure Maternal Grandmother      Arrhythmia Maternal Grandmother         on blood thinners     Cancer Maternal Grandmother         TOB, ? esophageal     Dementia Maternal Grandfather      Prostate Cancer Maternal Grandfather      Past Surgical History:   Procedure Laterality Date     COLONOSCOPY  10/2007     DENTAL SURGERY         ENVIRONMENTAL HISTORY: [place] The home is heated with a forced air. They do have central air conditioning. The patient's bedroom is furnished with carpeting in bedroom and allergen mattress cover.  Pets inside the house include 1 dog(s). There is no history of cockroach or mice infestation. There is/are 0 smokers living in the house.  There is/are 0 who smoke ecigarettes/vape living in the house.The house does not have a damp basement.     SOCIAL HISTORY:   Juarez is employed as . He lives with his roomate.  His roomate works as a/an .    REVIEW OF SYSTEMS:  General: negative for weight gain. negative for weight loss.  negative for changes in sleep.   Eyes: negative for itching. negative for redness. negative for tearing/watering. negative for vision changes  Ears: negative for fullness. negative for hearing loss. negative for dizziness.   Nose: negative for snoring.negative for changes in smell. negative for drainage.   Throat: negative for hoarseness. negative for sore throat. negative for trouble swallowing.   Lungs: negative for cough. negative for shortness of breath.negative for wheezing. negative for sputum production.   Cardiovascular: negative for chest pain. negative for swelling of ankles. negative for fast or irregular heartbeat.   Gastrointestinal: negative for nausea. negative for heartburn. negative for acid reflux.   Musculoskeletal: negative for joint pain. negative for joint stiffness. negative for joint swelling.   Neurologic: negative for seizures. negative for fainting. negative for weakness.   Psychiatric: negative for changes in mood. negative for anxiety.   Endocrine: negative for cold intolerance. negative for heat intolerance. negative for tremors.   Hematologic: negative for easy bruising. negative for easy bleeding.  Integumentary: negative for rash. negative for scaling. negative for nail changes.       Current Outpatient Medications:      Cholecalciferol (VITAMIN D PO), , Disp: , Rfl:      Creatine Monohydrate POWD, 1 scoop daily into protein shake, Disp: 454 g, Rfl: 11     dicyclomine (BENTYL) 10 MG capsule, Take 10 mg by mouth, Disp: , Rfl:      DULoxetine (CYMBALTA) 60 MG capsule, Take 120mg daily--follows with psychiatry, Disp: , Rfl:      hyoscyamine (LEVSIN/SL) 0.125 MG sublingual tablet, Place 1 tablet (0.125 mg) under the tongue every 4 hours as needed for cramping, Disp: 30 tablet, Rfl: 11     LORazepam (ATIVAN) 1 MG tablet, Take 1 tablet (1 mg) by mouth every 8 hours as needed for anxiety, Disp: 20 tablet, Rfl: 0     mirtazapine (REMERON) 30 MG tablet, Take 1 at bedtime, follows with  psychiatrist, Disp: 90 tablet, Rfl: 0     multivitamin w/minerals (THERA-VIT-M) tablet, Take 1 tablet by mouth daily, Disp: , Rfl:      prazosin (MINIPRESS) 5 MG capsule, Take 1 at bedtime, follows with psychiatry, Disp: , Rfl:      sildenafil (VIAGRA) 100 MG tablet, Take 1 tablet (100 mg) by mouth daily as needed (ED), Disp: 6 tablet, Rfl: 3     SKYRIZI, 150 MG DOSE, 75 MG/0.83ML subcutaneous, , Disp: , Rfl:      study - emtricitabine-tenofovir 200-300, IDS#[id number], (TRUVADA) per tablet, Take 1 tablet by mouth daily, Disp: 30 tablet, Rfl:      ORDER FOR ALLERGEN IMMUNOTHERAPY, Name of Mix: Mix #1  Cat, Grass, Ragweed, Tree  Cat Hair, Standardized 10,000 BAU/mL, ALK  0.5 ml  Birch Mix PRW 1:20 w/v, HS  0.3 ml Cedar, Red 1:20 w/v, HS  0.3 ml Toño Grass (Std) 100,000 BAU/mL, HS 0.3 ml Ragweed Mixed 1:20 w/v ALK  0.3 ml Diluent: HSA qs to 5ml, Disp: 5 mL, Rfl: PRN     ORDER FOR ALLERGEN IMMUNOTHERAPY, Name of Mix: Mix #2  Mold Alternaria Tenuis 1:10 w/v, HS  0.2 ml Diluent: HSA qs to 5ml, Disp: 5 mL, Rfl: PRN    Current Facility-Administered Medications:      hydrocortisone (CORTAID) 1 % cream, , Topical, BID, Vitaly Messina MD  Immunization History   Administered Date(s) Administered     DTAP (<7y) 1985, 1985, 01/07/1986, 03/27/1987, 03/16/1988     Hep B, Peds or Adolescent 03/18/1988, 05/11/1988, 10/22/1988     HepA-Adult 03/20/2018, 10/15/2018     MMR 1985, 03/12/1987, 03/18/1988     Meningococcal (Menactra ) 08/10/2004     Pneumococcal (PCV 7) 1985, 05/22/1987, 06/21/2008     TDAP Vaccine (Boostrix) 09/23/2015     Allergies   Allergen Reactions     Neosporin Af [Miconazole]      Adhesive Tape Rash     Topical creams: neosporin and bacitracin     Bacitracin Rash     Bacitracin-Polymyxin B Rash and GI Disturbance     No Clinical Screening - See Comments Rash     Topical creams: neosporin and bacitracin     Triple Antibiotic Rash         EXAM:   /74 (BP Location: Right arm,  Patient Position: Sitting, Cuff Size: Adult Regular)   Wt 83.5 kg (184 lb)   BMI 25.66 kg/m    GENERAL APPEARANCE: alert, cooperative and not in distress  SKIN: no rashes, no lesions  HEAD: atraumatic, normocephalic  EYES: lids and lashes normal, conjunctivae and sclerae clear, EOM full and intact  ENT: no scars or lesions, nasal exam showed nasal septal deviation, otoscopy showed external auditory canals clear, tympanic membranes normal, tongue midline and normal, soft palate, uvula, and tonsils normal  NECK: no asymmetry, masses, or scars, supple without significant adenopathy  LUNGS: unlabored respirations, no intercostal retractions or accessory muscle use, clear to auscultation without rales or wheezes  HEART: regular rate and rhythm without murmurs and normal S1 and S2  MUSCULOSKELETAL: no musculoskeletal defects are noted  NEURO: no focal deficits noted  PSYCH: does not appear depressed or anxious    WORKUP: None    ASSESSMENT/PLAN:  Juarez Jauregui is a 35 year old male seen for evaluation of allergies.    1. Allergic rhinitis due to pollen, animals, and mold - Ricardo has completed nearly 2 years of allergen immunotherapy treatment at his maintenance dose. He reports significant improvement in his symptoms but continues to notice some nasal blockage.  He has not needed to take antihistamines or use nasal steroids over the past year and he has not had a sinus infection since beginning immunotherapy treatment. We reviewed the risks, benefits, and recommend duration of immunotherapy treatment.    - continue allergen immunotherapy treatment per protocol to complete 3-5 years at the maintenance dose    2. Nasal septal deviation - Ricardo reports some persistent nasal blockage despite improvement in his other symptoms since initiating allergen immunotherapy treatment. He is interested in pursuing possible surgical correction.    - OTOLARYNGOLOGY REFERRAL      Follow-up in 1 year, sooner if needed      Thank you for  allowing me to participate in the care of Juarez Jauregui.      Jennifer Fiore MD, FAAAAI  Allergy/Immunology  [facility]      Chart documentation done in part with Dragon Voice Recognition Software. Although reviewed after completion, some word and grammatical errors may remain.

## 2021-01-26 NOTE — PROGRESS NOTES
ALLERGY SOLUTION RE-ORDER REQUEST    Juarez Jauregui 1985 MRN: [id number]    DATE NEEDED:  ASAP - next appointment scheduled for 2/1/21  Vial Color Content    Vial Size  Yellow 1:10 and Red 1:1 Cat, Grass, Trees, Weeds  5mL  Yellow 1:10 and Red 1:1 Molds   5mL      Serum reorder consent signed and patient/parent was advised that new serums would be ordered through the pharmacy and billed to their insurance company when they arrive in clinic. Yes    Shot Clinic Location:  [place]  Ship to Location: [place]  Serum billed to:  [place]    Special Instructions:  None        Requester Signature  Jennifer Fiore MD

## 2021-01-26 NOTE — PROGRESS NOTES
Patient presented for his allergy shot appointment.  [place] [facility] [place]  [place] drawing the patient's serums up it was discovered that the patient's vial for C,T,G,W was empty.  Patient had one dose remaining in the his M vial. Patient declined to receive his M allergy shot today as patient stated he wanted to keep on the same schedule for both shots.  Vials were discarded.    Virginia CISNEROS RN

## 2021-01-28 ENCOUNTER — TELEPHONE (OUTPATIENT)
Dept: ALLERGY | Facility: CLINIC | Age: 36
End: 2021-01-28

## 2021-01-28 NOTE — TELEPHONE ENCOUNTER
Decrease to 0.3mL of yellow vial for all injections. If tolerated he may resume building back to the maintenance dose in 0.1mL increments every 3-14 days.

## 2021-01-28 NOTE — TELEPHONE ENCOUNTER
Please advise new dosing orders, building schedule, and date which orders are valid through.  Patient's last shot was 12-, dose was 0.5ml red, next appointment scheduled for 02- which will be 49 days.     New orders will be added to immunotherapy flowsheet once they are received.     PATIENT HAS NEW VIALS FROM A NEW MANUFACTURE.    Virginia CISNEROS RN

## 2021-02-01 PROBLEM — J30.81 ALLERGIC RHINITIS DUE TO ANIMALS: Status: ACTIVE | Noted: 2021-02-01

## 2021-02-01 PROBLEM — J30.89 ALLERGIC RHINITIS DUE TO MOLD: Status: ACTIVE | Noted: 2021-02-01

## 2021-02-01 PROBLEM — J30.1 SEASONAL ALLERGIC RHINITIS DUE TO POLLEN: Status: ACTIVE | Noted: 2018-03-20

## 2021-02-01 NOTE — PROGRESS NOTES
I am seeing this patient in consultation for nasal septal deviation at the request of the provider Dr. Jennifer Fiore.    Chief Complaint - Nasal obstruction    History of Present Illness - Juarez Jauregui is a 35 year old male who presents for evaluation of nasal obstruction. The patient describes symptoms of nasal obstruction for the past many years. Also has a CPAP and can't get the air to go up one side. The patient notes symptoms most predominately on the right side. The patient notes + allergies. They have been tested for allergies in the past. He takes allergy shots. He hasn't gotten a sinus infection in a year. He has had viral infections. Treatments have included nasal steroids and oral antihistamines. The treatments seem to not help. no history of nasal trauma. No prior history of nasal surgery. nonsmoker. No epistaxis. I personally reviewed the relevant clinical notes in Epic including the allergist providers note. Ct sinus 8/29/2018 images personally reviewed showing severe septal deviation to the left. No sinusitis.    Past Medical History -   Patient Active Problem List   Diagnosis     Strain of neck muscle, initial encounter     Acute recurrent maxillary sinusitis     Anxiety     History of migraine     Chronic seasonal allergic rhinitis     Neck pain     Upper back pain     Cervicalgia     Recurrent major depressive disorder, in partial remission (H)     Trauma and stressor-related disorder       Current Medications -   Current Outpatient Medications:      Cholecalciferol (VITAMIN D PO), , Disp: , Rfl:      Creatine Monohydrate POWD, 1 scoop daily into protein shake, Disp: 454 g, Rfl: 11     dicyclomine (BENTYL) 10 MG capsule, Take 10 mg by mouth, Disp: , Rfl:      DULoxetine (CYMBALTA) 60 MG capsule, Take 120mg daily--follows with psychiatry, Disp: , Rfl:      hyoscyamine (LEVSIN/SL) 0.125 MG sublingual tablet, Place 1 tablet (0.125 mg) under the tongue every 4 hours as needed for cramping, Disp: 30  tablet, Rfl: 11     LORazepam (ATIVAN) 1 MG tablet, Take 1 tablet (1 mg) by mouth every 8 hours as needed for anxiety, Disp: 20 tablet, Rfl: 0     mirtazapine (REMERON) 30 MG tablet, Take 1 at bedtime, follows with psychiatrist, Disp: 90 tablet, Rfl: 0     multivitamin w/minerals (THERA-VIT-M) tablet, Take 1 tablet by mouth daily, Disp: , Rfl:      ORDER FOR ALLERGEN IMMUNOTHERAPY, Name of Mix: Mix #1  Cat, Grass, Ragweed, Tree  Cat Hair, Standardized 10,000 BAU/mL, ALK  0.5 ml  Birch Mix PRW 1:20 w/v, HS  0.3 ml Cedar, Red 1:20 w/v, HS  0.3 ml To[place] Grass (Std) 100,000 BAU/mL, HS 0.3 ml Ragweed Mixed 1:20 w/v ALK  0.3 ml Diluent: HSA qs to 5ml, Disp: 5 mL, Rfl: PRN     ORDER FOR ALLERGEN IMMUNOTHERAPY, Name of Mix: Mix #2  Mold Alternaria Tenuis 1:10 w/v, HS  0.2 ml Diluent: HSA qs to 5ml, Disp: 5 mL, Rfl: PRN     prazosin (MINIPRESS) 5 MG capsule, Take 1 at bedtime, follows with psychiatry, Disp: , Rfl:      sildenafil (VIAGRA) 100 MG tablet, Take 1 tablet (100 mg) by mouth daily as needed (ED), Disp: 6 tablet, Rfl: 3     SKYRIZI, 150 MG DOSE, 75 MG/0.83ML subcutaneous, , Disp: , Rfl:      study - emtricitabine-tenofovir 200-300, IDS#[id number], (TRUVADA) per tablet, Take 1 tablet by mouth daily, Disp: 30 tablet, Rfl:     Current Facility-Administered Medications:      hydrocortisone (CORTAID) 1 % cream, , Topical, BID, Vitaly Messina MD    Allergies -   Allergies   Allergen Reactions     Neosporin Af [Miconazole]      Adhesive Tape Rash     Topical creams: neosporin and bacitracin     Bacitracin Rash     Bacitracin-Polymyxin B Rash and GI Disturbance     No Clinical Screening - See Comments Rash     Topical creams: neosporin and bacitracin     Triple Antibiotic Rash       Social History -   Social History     Socioeconomic History     Marital status: Single     Spouse name: Not on file     Number of children: Not on file     Years of education: Not on file     Highest education level: Not on file    Occupational History     Not on file   Social Needs     Financial resource strain: Not on file     Food insecurity     Worry: Not on file     Inability: Not on file     Transportation needs     Medical: Not on file     Non-medical: Not on file   Tobacco Use     Smoking status: Never Smoker     Smokeless tobacco: Never Used   Substance and Sexual Activity     Alcohol use: Yes     Frequency: 2-3 times a week     Drinks per session: 1 or 2     Binge frequency: Less than monthly     Comment: social     Drug use: No     Sexual activity: Not on file   Lifestyle     Physical activity     Days per week: Not on file     Minutes per session: Not on file     Stress: Not on file   Relationships     Social connections     Talks on phone: Not on file     Gets together: Not on file     Attends Buddhist service: Not on file     Active member of club or organization: Not on file     Attends meetings of clubs or organizations: Not on file     Relationship status: Not on file     Intimate partner violence     Fear of current or ex partner: Not on file     Emotionally abused: Not on file     Physically abused: Not on file     Forced sexual activity: Not on file   Other Topics Concern     Not on file   Social History Narrative     Not on file       Family History -   Family History   Problem Relation Age of Onset     Pre-Diabetes Mother      Parkinsonism Maternal Grandmother      Heart Failure Maternal Grandmother      Arrhythmia Maternal Grandmother         on blood thinners     Cancer Maternal Grandmother         TOB, ? esophageal     Dementia Maternal Grandfather      Prostate Cancer Maternal Grandfather        Review of Systems - As per HPI and PMHx, otherwise 10+ comprehensive system review is negative.    Physical Exam  /78   Pulse 87   SpO2 98%   General - The patient is in no distress. Alert and oriented to person and place, answers questions and cooperates with examination appropriately.   Neurologic - CN II-XII are  grossly intact. No focal neurologic deficits.   Voice and Breathing - The patient was breathing comfortably without the use of accessory muscles. There was no wheezing, stridor, or stertor.  The patients voice was clear and strong.  Eyes - Extraocular movements intact. Sclera were not icteric or injected, conjunctiva were pink and moist.  Mouth - Examination of the oral cavity showed pink, healthy oral mucosa. No lesions or ulcerations noted.  The tongue was mobile and midline, and the dentition were in good condition.    Throat - The walls of the oropharynx were smooth, pink, moist, symmetric, and had no lesions or ulcerations.  The tonsillar pillars and soft palate were symmetric.  The uvula was midline on elevation. Tonsils 1-2+, cryptic. No erythema.   Neck -  Soft, non-tender. Palpation of the occipital, submental, submandibular, internal jugular chain, and supraclavicular nodes did not demonstrate any abnormal lymph nodes or masses. No parotid masses. Palpation of the thyroid was soft and smooth, with no nodules or goiter appreciated.  The trachea was mobile and midline.  Cardiovascular - carotid pulses are 2+ bilaterally, regular rhythm  Nose - External contour is deviated some, no scars.  Nasal mucosa is pink and moist with clear mucus. However the turbinates are hypertrophic, right more than left. The septum was deviated to the left more along the floor and then to the right along the mid septum. No polyps, masses, or purulence noted on examination.      A/P - Juarez Jauregui is a 35 year old male with nasal obstruction due to septal deviation, turbinate hypertrophy and nasal valve narrowing. I recommend treatment with  septorhinoplasty with inferior turbinate reduction and out-fracture. We discussed the surgery in detail. I counseled the patient on the risks of surgery, including infection, bleeding, risks of general anesthesia, the risk of failure of the surgery to relieve nasal obstruction, the possible  need for additional procedures, the possible need for additional medical therapy, and the possibility of alteration of the appearance of the external nose in a way that he does not desire, revision procedures. He understands and after the discussion wants to proceed with a facial plastic surgeon. Referral provided.      Roberto Funk MD  Otolaryngology  [facility]

## 2021-02-02 ENCOUNTER — OFFICE VISIT (OUTPATIENT)
Dept: OTOLARYNGOLOGY | Facility: CLINIC | Age: 36
End: 2021-02-02
Payer: COMMERCIAL

## 2021-02-02 ENCOUNTER — ALLIED HEALTH/NURSE VISIT (OUTPATIENT)
Dept: ALLERGY | Facility: CLINIC | Age: 36
End: 2021-02-02
Payer: COMMERCIAL

## 2021-02-02 VITALS — DIASTOLIC BLOOD PRESSURE: 78 MMHG | SYSTOLIC BLOOD PRESSURE: 117 MMHG | HEART RATE: 87 BPM | OXYGEN SATURATION: 98 %

## 2021-02-02 DIAGNOSIS — J34.2 NASAL SEPTAL DEVIATION: ICD-10-CM

## 2021-02-02 DIAGNOSIS — J30.1 SEASONAL ALLERGIC RHINITIS DUE TO POLLEN: Primary | ICD-10-CM

## 2021-02-02 DIAGNOSIS — J34.89 NASAL OBSTRUCTION: Primary | ICD-10-CM

## 2021-02-02 DIAGNOSIS — M95.0 NASAL DEFORMITY, ACQUIRED: ICD-10-CM

## 2021-02-02 DIAGNOSIS — J34.3 NASAL TURBINATE HYPERTROPHY: ICD-10-CM

## 2021-02-02 PROCEDURE — 99243 OFF/OP CNSLTJ NEW/EST LOW 30: CPT | Performed by: OTOLARYNGOLOGY

## 2021-02-02 PROCEDURE — 99207 PR NO CHARGE NURSE ONLY: CPT

## 2021-02-02 NOTE — LETTER
2/2/2021         RE: Juarez Jauregui  [address] [place] [address]        Dear Colleague,    Thank you for referring your patient, Juarez Jauregui, to the [facility]. Please see a copy of my visit note below.    I am seeing this patient in consultation for nasal septal deviation at the request of the provider Dr. Jennifer Fiore.    Chief Complaint - Nasal obstruction    History of Present Illness - Juarez Jauregui is a 35 year old male who presents for evaluation of nasal obstruction. The patient describes symptoms of nasal obstruction for the past many years. Also has a CPAP and can't get the air to go up one side. The patient notes symptoms most predominately on the right side. The patient notes + allergies. They have been tested for allergies in the past. He takes allergy shots. He hasn't gotten a sinus infection in a year. He has had viral infections. Treatments have included nasal steroids and oral antihistamines. The treatments seem to not help. no history of nasal trauma. No prior history of nasal surgery. nonsmoker. No epistaxis. I personally reviewed the relevant clinical notes in Epic including the allergist providers note. Ct sinus 8/29/2018 images personally reviewed showing severe septal deviation to the left. No sinusitis.    Past Medical History -   Patient Active Problem List   Diagnosis     Strain of neck muscle, initial encounter     Acute recurrent maxillary sinusitis     Anxiety     History of migraine     Chronic seasonal allergic rhinitis     Neck pain     Upper back pain     Cervicalgia     Recurrent major depressive disorder, in partial remission (H)     Trauma and stressor-related disorder       Current Medications -   Current Outpatient Medications:      Cholecalciferol (VITAMIN D PO), , Disp: , Rfl:      Creatine Monohydrate POWD, 1 scoop daily into protein shake, Disp: 454 g, Rfl: 11     dicyclomine (BENTYL) 10 MG capsule, Take 10 mg by mouth, Disp: ,  Rfl:      DULoxetine (CYMBALTA) 60 MG capsule, Take 120mg daily--follows with psychiatry, Disp: , Rfl:      hyoscyamine (LEVSIN/SL) 0.125 MG sublingual tablet, Place 1 tablet (0.125 mg) under the tongue every 4 hours as needed for cramping, Disp: 30 tablet, Rfl: 11     LORazepam (ATIVAN) 1 MG tablet, Take 1 tablet (1 mg) by mouth every 8 hours as needed for anxiety, Disp: 20 tablet, Rfl: 0     mirtazapine (REMERON) 30 MG tablet, Take 1 at bedtime, follows with psychiatrist, Disp: 90 tablet, Rfl: 0     multivitamin w/minerals (THERA-VIT-M) tablet, Take 1 tablet by mouth daily, Disp: , Rfl:      ORDER FOR ALLERGEN IMMUNOTHERAPY, Name of Mix: Mix #1  Cat, Grass, Ragweed, Tree  Cat Hair, Standardized 10,000 BAU/mL, ALK  0.5 ml  Birch Mix PRW 1:20 w/v, HS  0.3 ml Cedar, Red 1:20 w/v, HS  0.3 ml Toño Grass (Std) 100,000 BAU/mL, HS 0.3 ml Ragweed Mixed 1:20 w/v ALK  0.3 ml Diluent: HSA qs to 5ml, Disp: 5 mL, Rfl: PRN     ORDER FOR ALLERGEN IMMUNOTHERAPY, Name of Mix: Mix #2  Mold Alternaria Tenuis 1:10 w/v, HS  0.2 ml Diluent: HSA qs to 5ml, Disp: 5 mL, Rfl: PRN     prazosin (MINIPRESS) 5 MG capsule, Take 1 at bedtime, follows with psychiatry, Disp: , Rfl:      sildenafil (VIAGRA) 100 MG tablet, Take 1 tablet (100 mg) by mouth daily as needed (ED), Disp: 6 tablet, Rfl: 3     SKYRIZI, 150 MG DOSE, 75 MG/0.83ML subcutaneous, , Disp: , Rfl:      study - emtricitabine-tenofovir 200-300, IDS#[id number], (TRUVADA) per tablet, Take 1 tablet by mouth daily, Disp: 30 tablet, Rfl:     Current Facility-Administered Medications:      hydrocortisone (CORTAID) 1 % cream, , Topical, BID, Vitaly Messina MD    Allergies -   Allergies   Allergen Reactions     Neosporin Af [Miconazole]      Adhesive Tape Rash     Topical creams: neosporin and bacitracin     Bacitracin Rash     Bacitracin-Polymyxin B Rash and GI Disturbance     No Clinical Screening - See Comments Rash     Topical creams: neosporin and bacitracin     Triple Antibiotic  Rash       Social History -   Social History     Socioeconomic History     Marital status: Single     Spouse name: Not on file     Number of children: Not on file     Years of education: Not on file     Highest education level: Not on file   Occupational History     Not on file   Social Needs     Financial resource strain: Not on file     Food insecurity     Worry: Not on file     Inability: Not on file     Transportation needs     Medical: Not on file     Non-medical: Not on file   Tobacco Use     Smoking status: Never Smoker     Smokeless tobacco: Never Used   Substance and Sexual Activity     Alcohol use: Yes     Frequency: 2-3 times a week     Drinks per session: 1 or 2     Binge frequency: Less than monthly     Comment: social     Drug use: No     Sexual activity: Not on file   Lifestyle     Physical activity     Days per week: Not on file     Minutes per session: Not on file     Stress: Not on file   Relationships     Social connections     Talks on phone: Not on file     Gets together: Not on file     Attends Judaism service: Not on file     Active member of club or organization: Not on file     Attends meetings of clubs or organizations: Not on file     Relationship status: Not on file     Intimate partner violence     Fear of current or ex partner: Not on file     Emotionally abused: Not on file     Physically abused: Not on file     Forced sexual activity: Not on file   Other Topics Concern     Not on file   Social History Narrative     Not on file       Family History -   Family History   Problem Relation Age of Onset     Pre-Diabetes Mother      Parkinsonism Maternal Grandmother      Heart Failure Maternal Grandmother      Arrhythmia Maternal Grandmother         on blood thinners     Cancer Maternal Grandmother         TOB, ? esophageal     Dementia Maternal Grandfather      Prostate Cancer Maternal Grandfather        Review of Systems - As per HPI and PMHx, otherwise 10+ comprehensive system review is  negative.    Physical Exam  /78   Pulse 87   SpO2 98%   General - The patient is in no distress. Alert and oriented to person and place, answers questions and cooperates with examination appropriately.   Neurologic - CN II-XII are grossly intact. No focal neurologic deficits.   Voice and Breathing - The patient was breathing comfortably without the use of accessory muscles. There was no wheezing, stridor, or stertor.  The patients voice was clear and strong.  Eyes - Extraocular movements intact. Sclera were not icteric or injected, conjunctiva were pink and moist.  Mouth - Examination of the oral cavity showed pink, healthy oral mucosa. No lesions or ulcerations noted.  The tongue was mobile and midline, and the dentition were in good condition.    Throat - The walls of the oropharynx were smooth, pink, moist, symmetric, and had no lesions or ulcerations.  The tonsillar pillars and soft palate were symmetric.  The uvula was midline on elevation. Tonsils 1-2+, cryptic. No erythema.   Neck -  Soft, non-tender. Palpation of the occipital, submental, submandibular, internal jugular chain, and supraclavicular nodes did not demonstrate any abnormal lymph nodes or masses. No parotid masses. Palpation of the thyroid was soft and smooth, with no nodules or goiter appreciated.  The trachea was mobile and midline.  Cardiovascular - carotid pulses are 2+ bilaterally, regular rhythm  Nose - External contour is deviated some, no scars.  Nasal mucosa is pink and moist with clear mucus. However the turbinates are hypertrophic, right more than left. The septum was deviated to the left more along the floor and then to the right along the mid septum. No polyps, masses, or purulence noted on examination.      A/P - Juarez BRANDON Juventino is a 35 year old male with nasal obstruction due to septal deviation, turbinate hypertrophy and nasal valve narrowing. I recommend treatment with  septorhinoplasty with inferior turbinate reduction  and out-fracture. We discussed the surgery in detail. I counseled the patient on the risks of surgery, including infection, bleeding, risks of general anesthesia, the risk of failure of the surgery to relieve nasal obstruction, the possible need for additional procedures, the possible need for additional medical therapy, and the possibility of alteration of the appearance of the external nose in a way that he does not desire, revision procedures. He understands and after the discussion wants to proceed with a facial plastic surgeon. Referral provided.      Roberot Funk MD  Otolaryngology  [facility]        Again, thank you for allowing me to participate in the care of your patient.        Sincerely,        Roberto Funk MD

## 2021-02-04 DIAGNOSIS — J30.1 ALLERGIC RHINITIS DUE TO POLLEN: Primary | ICD-10-CM

## 2021-02-04 PROCEDURE — 95165 ANTIGEN THERAPY SERVICES: CPT | Performed by: ALLERGY & IMMUNOLOGY

## 2021-02-04 NOTE — PROGRESS NOTES
Allergy serums billed at [facility].     Vials billed below:    Vial Color Content                      Vial Size Expiration Date  Yellow 1:10 and Red 1:1 Cat, Grass, Trees, Weeds 5 mL  each  Yellow 1:10 and Red 1:1 Molds 5 mL  each    Original Refill encounter date: 1/26/2021      Evelyn CROSS MA

## 2021-02-04 NOTE — PROGRESS NOTES
Allergy serums received at [facility].     Vials received below:    Vial Color Content                      Vial Size Expiration Date  Yellow 1:10 and Red 1:1 Cat, Grass, Trees, Weeds 5 mL  each  Yellow 1:10 and Red 1:1 Molds 5 mL  each      Signature  Radha CROSS MA

## 2021-02-12 LAB — HIV 1&2 EXT: NORMAL

## 2021-02-15 ENCOUNTER — ALLIED HEALTH/NURSE VISIT (OUTPATIENT)
Dept: ALLERGY | Facility: CLINIC | Age: 36
End: 2021-02-15
Payer: COMMERCIAL

## 2021-02-15 ENCOUNTER — TELEPHONE (OUTPATIENT)
Dept: ALLERGY | Facility: CLINIC | Age: 36
End: 2021-02-15

## 2021-02-15 DIAGNOSIS — J30.1 SEASONAL ALLERGIC RHINITIS DUE TO POLLEN: Primary | ICD-10-CM

## 2021-02-15 PROCEDURE — 95117 IMMUNOTHERAPY INJECTIONS: CPT

## 2021-02-15 PROCEDURE — 99207 PR DROP WITH A PROCEDURE: CPT

## 2021-02-15 NOTE — TELEPHONE ENCOUNTER
Decrease to 0.3ml of yellow vial for all injections. If tolerated, resume building back to maintenance dose in 0.1mL increments every 3-14 days.

## 2021-02-15 NOTE — PROGRESS NOTES
Patient presented after waiting 30 minutes with no reaction to allergy injections. Discharged from clinic.    Virginia CISNEROS RN

## 2021-02-15 NOTE — TELEPHONE ENCOUNTER
Please advise new dosing orders, building schedule, and date which orders are valid through.  Patient's last shot was 12/15/2020, dose was 0.5 ml, next appointment scheduled for 2/15/2021 which will be 62 days.     New orders will be added to immunotherapy flowsheet once they are received.     Radha CROSS MA

## 2021-02-22 ENCOUNTER — ALLIED HEALTH/NURSE VISIT (OUTPATIENT)
Dept: ALLERGY | Facility: CLINIC | Age: 36
End: 2021-02-22
Payer: COMMERCIAL

## 2021-02-22 DIAGNOSIS — J30.1 SEASONAL ALLERGIC RHINITIS DUE TO POLLEN: Primary | ICD-10-CM

## 2021-02-22 PROCEDURE — 99207 PR DROP WITH A PROCEDURE: CPT

## 2021-02-22 PROCEDURE — 95117 IMMUNOTHERAPY INJECTIONS: CPT

## 2021-02-24 ENCOUNTER — OFFICE VISIT (OUTPATIENT)
Dept: OTOLARYNGOLOGY | Facility: CLINIC | Age: 36
End: 2021-02-24
Attending: OTOLARYNGOLOGY
Payer: COMMERCIAL

## 2021-02-24 VITALS — TEMPERATURE: 98 F | BODY MASS INDEX: 25.9 KG/M2 | HEIGHT: 71 IN | WEIGHT: 185 LBS

## 2021-02-24 DIAGNOSIS — J34.2 DEVIATED NASAL SEPTUM: ICD-10-CM

## 2021-02-24 DIAGNOSIS — J34.89 NASAL VALVE STENOSIS: ICD-10-CM

## 2021-02-24 DIAGNOSIS — J34.89 NASAL OBSTRUCTION: Primary | ICD-10-CM

## 2021-02-24 DIAGNOSIS — J34.3 HYPERTROPHY OF INFERIOR NASAL TURBINATE: ICD-10-CM

## 2021-02-24 PROCEDURE — 31231 NASAL ENDOSCOPY DX: CPT | Performed by: OTOLARYNGOLOGY

## 2021-02-24 PROCEDURE — 99214 OFFICE O/P EST MOD 30 MIN: CPT | Mod: 25 | Performed by: OTOLARYNGOLOGY

## 2021-02-24 ASSESSMENT — PAIN SCALES - GENERAL: PAINLEVEL: NO PAIN (0)

## 2021-02-24 ASSESSMENT — MIFFLIN-ST. JEOR: SCORE: 1796.28

## 2021-02-24 NOTE — NURSING NOTE
"Chief Complaint   Patient presents with     Consult     Nasal obstruction, nasal septal deviation       Temperature 98  F (36.7  C), temperature source Temporal, height 1.803 m (5' 11\"), weight 83.9 kg (185 lb).    Jemima Ayala, EMT  "

## 2021-02-24 NOTE — PROGRESS NOTES
Juarez Jauregui is here for a general check up.  He is fasting. He is up to date on eye exams (LASIK) and dental visits. Wears seat belt.--yes Wears bike helmet--na.  Concerns today:    HCM  Ricardo asks about HPV vaccine series  All other vaccines are up to date    Septal deviation  Ricardo reports he is following with an ENT. He has hx of obstructive sleep apnea, and severe septal deviation to the left. He is planning to have septorhinoplasty sometime this year.     Anxiety/depression  Ricardo sees a psychiatrist every 6 wks and a therapist weekly.   Medications changes were documented in the chart.     PHQ 3/7/2020 5/20/2020 3/17/2021   PHQ-9 Total Score 9 3 9   Q9: Thoughts of better off dead/self-harm past 2 weeks Not at all Not at all Not at all     LEONOR-7 SCORE 3/7/2020 5/20/2020 3/17/2021   Total Score - 4 (minimal anxiety) -   Total Score 11 4 8     Weight gain  Ricardo reports he has a family hx of thyroid disease.He has had a 10lb weight gain, has some fatigue. Would like his thyroid level checked.     Psoriasis  Ricardo follows with a dermatologist for treatment of psoriasis. Skin is doing very well. He takes Skyrizi. He has hx of irritable bowel, alternating constipation and diarrhea. He reports that since starting the Skyrizi, his bowel problems have resolved.    Diet: vegetarian, weight gain of 10 pounds  Body mass index is 28.05 kg/m .  Wt Readings from Last 2 Encounters:   03/17/21 88.7 kg (195 lb 8 oz)   02/24/21 83.9 kg (185 lb)       Health Maintenance   Topic Date Due     ADVANCE CARE PLANNING  1985     HIV SCREENING  06/20/2000     HEPATITIS C SCREENING  06/20/2003     PREVENTIVE CARE VISIT  03/20/2019     PHQ-9  11/19/2020     LIPID  03/20/2023     DTAP/TDAP/TD IMMUNIZATION (6 - Td) 09/23/2025     DEPRESSION ACTION PLAN  Completed     INFLUENZA VACCINE  Completed     HEPATITIS B IMMUNIZATION  Completed     Pneumococcal Vaccine: Pediatrics (0 to 5 Years) and At-Risk Patients (6 to 64 Years)  Aged Out      IPV IMMUNIZATION  Aged Out     MENINGITIS IMMUNIZATION  Aged Out         Patient Active Problem List   Diagnosis     Strain of neck muscle, initial encounter     Acute recurrent maxillary sinusitis     Anxiety     History of migraine     Seasonal allergic rhinitis due to pollen     Neck pain     Upper back pain     Cervicalgia     Recurrent major depressive disorder, in partial remission (H)     Trauma and stressor-related disorder     Allergic rhinitis due to animals     Allergic rhinitis due to mold       Past Surgical History:   Procedure Laterality Date     COLONOSCOPY  10/2007     DENTAL SURGERY         Family History   Problem Relation Age of Onset     Pre-Diabetes Mother      Parkinsonism Maternal Grandmother      Heart Failure Maternal Grandmother      Arrhythmia Maternal Grandmother         on blood thinners     Cancer Maternal Grandmother         TOB, ? 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     HABITS:  Tob: nonsmoker  ETOH: rare  Calcium: oat milk  2/day  Caffeine: 0-1/day  Exercise: 3-4x per week     MALE ROS  Partner: male, no current partner  ED: none  Contraception: condoms  STD concerns: none, follows at [facility]  BPH: no symptoms    Current Outpatient Medications   Medication Sig Dispense Refill     Cholecalciferol (VITAMIN D PO)        Creatine Monohydrate POWD 1 scoop daily into protein shake 454 g 11     dicyclomine (BENTYL) 10 MG capsule Take 10 mg by mouth       DULoxetine (CYMBALTA) 60 MG capsule Take 120mg daily--follows with psychiatry       hyoscyamine (LEVSIN/SL) 0.125 MG sublingual tablet Place 1 tablet (0.125 mg) under the tongue every 4 hours as needed for cramping 30 tablet 11     LORazepam (ATIVAN) 1 MG tablet Take 1 tablet (1 mg) by mouth every 8 hours as needed for anxiety 20 tablet 0     mirtazapine  (REMERON) 15 MG tablet Take 1 at bedtime, follows with psychiatrist 90 tablet 0     multivitamin w/minerals (THERA-VIT-M) tablet Take 1 tablet by mouth daily       ORDER FOR ALLERGEN IMMUNOTHERAPY Name of Mix: Mix #1  Cat, Grass, Ragweed, Tree   Cat Hair, Standardized 10,000 BAU/mL, ALK  0.5 ml   Birch Mix PRW 1:20 w/v, HS  0.3 ml  Cedar, Red 1:20 w/v, HS  0.3 ml  Toño Grass (Std) 100,000 BAU/mL, HS 0.3 ml  Ragweed Mixed 1:20 w/v ALK  0.3 ml  Diluent: HSA qs to 5ml 5 mL PRN     ORDER FOR ALLERGEN IMMUNOTHERAPY Name of Mix: Mix #2  Mold  Alternaria Tenuis 1:10 w/v, HS  0.2 ml  Diluent: HSA qs to 5ml 5 mL PRN     prazosin (MINIPRESS) 5 MG capsule Take 1 at bedtime, may take additonal 1mg dose at bedtime prn. follows with psychiatry 90 capsule 0     sildenafil (VIAGRA) 100 MG tablet Take 1 tablet (100 mg) by mouth daily as needed (ED) 6 tablet 3     SKYRIZI, 150 MG DOSE, 75 MG/0.83ML subcutaneous        study - emtricitabine-tenofovir 200-300, IDS#[id number], (TRUVADA) per tablet Take 1 tablet by mouth daily 30 tablet      Allergies   Allergen Reactions     Neosporin Af [Miconazole]      Adhesive Tape Rash     Topical creams: neosporin and bacitracin     Bacitracin Rash     Bacitracin-Polymyxin B Rash and GI Disturbance     No Clinical Screening - See Comments Rash     Topical creams: neosporin and bacitracin     Triple Antibiotic Rash         ROS  CONSTITUTIONAL:NEGATIVE for fever, chills, Positive 10 pound weight gain  INTEGUMENTARY/SKIN: NEGATIVE for worrisome rashes, moles or lesions. Hx of psoriasis, doing well on Skyrizi  EYES: NEGATIVE for vision changes or irritation  ENT/MOUTH: septal deviation as above, hx of allergies, goes for immunotherapy.   RESP:NEGATIVE for significant cough or SOB, hx of sleep apnea  CV: NEGATIVE for chest pain, palpitations, [place], orthopnea, PND  or peripheral edema  GI: NEGATIVE for nausea, abdominal pain, heartburn, or change in bowel habits  :NEGATIVE for frequency, dysuria, or  "hematuria  MUSCULOSKELETAL:hx of cervicalgia, upper back pain, healed left bicipital tendon rupture.   NEURO: NEGATIVE for weakness, dizziness or paresthesias. Positive hx of migraine  ENDOCRINE: NEGATIVE for polyuria/dipsia,  temperature intolerance, skin/hair changes  HEME/ALLERGY/IMMUNE: NEGATIVE for bleeding problems  PSYCHIATRIC: hx of depression, anxiety    EXAM  /71 (BP Location: Right arm, Patient Position: Sitting, Cuff Size: Adult Large)   Pulse 109   Temp 97.9  F (36.6  C) (Temporal)   Resp 16   Ht 1.778 m (5' 10\")   Wt 88.7 kg (195 lb 8 oz)   SpO2 94%   BMI 28.05 kg/m    GENERAL APPEARANCE: Alert, pleasant, NAD  EYES: PERRL, EOMI, conjunctiva clear  HENT: TM normal bilaterally. Nose and mouth masked  NECK: no adenopathy, thyroid normal to palpation  RESP: lungs clear to auscultation bilaterally  Axillae: no palpable axillary masses or adenopathy  CV: regular rate and rhythm, normal S1 S2, no murmur, no carotid bruits  ABDOMEN: soft, nontender, without HSM or masses. Bowel sounds normal  MS: extremities normal- no gross deformities noted, no tender, hot or swollen joints.    SKIN: no suspicious lesions or rashes  NEURO: Normal strength and tone, sensory exam grossly normal, DTR normoreflexive in upper and lower extremities  PSYCH: mentation appears normal. and affect normal/bright.  EXT: no peripheral edema, pedal pulses palpable    Assessment:  (Z00.00) Routine general medical examination at a health care facility  (primary encounter diagnosis)  Comment: 35 year old male in good health  Plan: Comprehensive Metabolic Panel ([facility]), CBC         with Diff Plt ([facility])        Today we discussed ways to maintain a healthy lifestyle with sensible eating, regular exercise and self cares. We dicussed calcium and Vitamin D intake, vaccinations and preventive health screens.   Check with insurance regarding HPV coverage. If covered, schedule nurse visits for this vaccine series.     (Z13.220) " Screening for lipid disorders  Comment:  Low HDL, elevated TG  Recent Labs   Lab Test 03/17/21  0850 03/20/18  1017   CHOL 162.0 153   HDL 32.0* 32*   LDL 76.0 88   TRIG 272.0* 165*   CHOLHDLRATIO 5.1*  --    Plan: Lipid Panel ([facility])        Recommend addition of fish oil 1200mg daily    (F33.41) Recurrent major depressive disorder, in partial remission (H)  Comment: follows with psychiatry and therapists, history of nightmares  Plan: mirtazapine (REMERON) 15 MG tablet, prazosin         (MINIPRESS) 5 MG capsule        Medications are updated in the chart    (R63.5) Weight gain  Comment: 10 lb weight gain  Plan: TSH with free T4 reflex        TSH is in normal range.    (Z13.1) Screening for diabetes mellitus  Comment: fasting glucose is 116  Plan: Hemoglobin A1c ([place])        A1c is low, 5.2%.     Danielle Mitchell MD  Internal Medicine/Pediatrics
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
## 2021-02-24 NOTE — PATIENT INSTRUCTIONS
COVID vaccine   Https://vaccineconnector.[place].gov/    Calcium 1200mg  Vitamin D 1000 IU    HPV vaccines  Call insurance to check if covered.  If yes, make nurse appointment (series of 3)    [facility] to see if available there, sliding scale or no cost.      Preventive Health Recommendations    Male Ages 26 - 39    Yearly exam:             See your health care provider every year in order to  o   Review health changes.   o   Discuss preventive care.    o   Review your medicines if your doctor has prescribed any.    You should be tested each year for STDs (sexually transmitted diseases), if you re at risk.     After age 35, talk to your provider about cholesterol testing. If you are at risk for heart disease, have your cholesterol tested at least every 5 years.     If you are at risk for diabetes, you should have a diabetes test (fasting glucose).  Shots: Get a flu shot each year. Get a tetanus shot every 10 years.     Nutrition:    Eat at least 5 servings of fruits and vegetables daily.     Eat whole-grain bread, whole-wheat pasta and brown rice instead of white grains and rice.     Get adequate Calcium and Vitamin D.     Lifestyle    Exercise for at least 150 minutes a week (30 minutes a day, 5 days a week). This will help you control your weight and prevent disease.     Limit alcohol to one drink per day.     No smoking.     Wear sunscreen to prevent skin cancer.     See your dentist every six months for an exam and cleaning.
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
## 2021-02-25 RX ORDER — CEFAZOLIN SODIUM 2 G/50ML
2 SOLUTION INTRAVENOUS SEE ADMIN INSTRUCTIONS
Status: CANCELLED | OUTPATIENT
Start: 2021-02-25

## 2021-02-25 RX ORDER — CEFAZOLIN SODIUM 2 G/50ML
2 SOLUTION INTRAVENOUS
Status: CANCELLED | OUTPATIENT
Start: 2021-02-25

## 2021-02-26 ENCOUNTER — HOSPITAL ENCOUNTER (OUTPATIENT)
Facility: CLINIC | Age: 36
End: 2021-02-26
Attending: OTOLARYNGOLOGY | Admitting: OTOLARYNGOLOGY
Payer: COMMERCIAL

## 2021-02-26 DIAGNOSIS — J34.2 DEVIATED NASAL SEPTUM: ICD-10-CM

## 2021-02-26 DIAGNOSIS — J34.3 HYPERTROPHY OF INFERIOR NASAL TURBINATE: ICD-10-CM

## 2021-02-26 DIAGNOSIS — J34.89 NASAL OBSTRUCTION: ICD-10-CM

## 2021-02-26 NOTE — NURSING NOTE
Photodocumentation obtained.    Completed nasal surgery education with pt and gave written materials on what to expect post-op, as well as a list of medications to avoid prior to surgery.    Will work to obtain prior auth for Septorhinoplasty.    Pt plans to make an appt in [place] for 3D imaging to discuss possible cosmetic changes during nasal surgery.    Katelin Hatfield RN  2/25/2021 9:04 PM

## 2021-03-01 ENCOUNTER — ALLIED HEALTH/NURSE VISIT (OUTPATIENT)
Dept: ALLERGY | Facility: CLINIC | Age: 36
End: 2021-03-01
Payer: COMMERCIAL

## 2021-03-01 DIAGNOSIS — J30.1 SEASONAL ALLERGIC RHINITIS DUE TO POLLEN: Primary | ICD-10-CM

## 2021-03-01 PROCEDURE — 99207 PR DROP WITH A PROCEDURE: CPT

## 2021-03-01 PROCEDURE — 95117 IMMUNOTHERAPY INJECTIONS: CPT

## 2021-03-09 ENCOUNTER — TELEPHONE (OUTPATIENT)
Dept: FAMILY MEDICINE | Facility: CLINIC | Age: 36
End: 2021-03-09

## 2021-03-09 NOTE — TELEPHONE ENCOUNTER
Reason for Call:  Other allergy shot    Detailed comments: please call pt to schedule allergy shot    Phone Number Patient can be reached at: Home number on file [phone] (home)    Best Time: any    Can we leave a detailed message on this number? YES    Call taken on 3/9/2021 at 1:03 PM by Kirstin Kirby

## 2021-03-09 NOTE — TELEPHONE ENCOUNTER
RN returned patient call.  Patient is already scheduled for an allergy shot on 03-.  Patient stated he will keep this appointment.    Virginia CISNEROS RN

## 2021-03-15 ENCOUNTER — ALLIED HEALTH/NURSE VISIT (OUTPATIENT)
Dept: ALLERGY | Facility: CLINIC | Age: 36
End: 2021-03-15
Payer: COMMERCIAL

## 2021-03-15 DIAGNOSIS — J30.1 SEASONAL ALLERGIC RHINITIS DUE TO POLLEN: Primary | ICD-10-CM

## 2021-03-15 PROCEDURE — 95117 IMMUNOTHERAPY INJECTIONS: CPT

## 2021-03-15 PROCEDURE — 99207 PR DROP WITH A PROCEDURE: CPT

## 2021-03-15 NOTE — PROGRESS NOTES
Patient presented after waiting 30 minutes with no reaction to allergy injections. Discharged from clinic.    Rodrigo BRAR RN....3/15/2021 2:00 PM

## 2021-03-17 ENCOUNTER — OFFICE VISIT (OUTPATIENT)
Dept: FAMILY MEDICINE | Facility: CLINIC | Age: 36
End: 2021-03-17
Payer: COMMERCIAL

## 2021-03-17 VITALS
OXYGEN SATURATION: 94 % | WEIGHT: 195.5 LBS | DIASTOLIC BLOOD PRESSURE: 71 MMHG | TEMPERATURE: 97.9 F | BODY MASS INDEX: 27.99 KG/M2 | HEART RATE: 109 BPM | RESPIRATION RATE: 16 BRPM | SYSTOLIC BLOOD PRESSURE: 108 MMHG | HEIGHT: 70 IN

## 2021-03-17 DIAGNOSIS — R63.5 WEIGHT GAIN: ICD-10-CM

## 2021-03-17 DIAGNOSIS — F33.41 RECURRENT MAJOR DEPRESSIVE DISORDER, IN PARTIAL REMISSION (H): ICD-10-CM

## 2021-03-17 DIAGNOSIS — Z00.00 ROUTINE GENERAL MEDICAL EXAMINATION AT A HEALTH CARE FACILITY: Primary | ICD-10-CM

## 2021-03-17 DIAGNOSIS — Z13.1 SCREENING FOR DIABETES MELLITUS: ICD-10-CM

## 2021-03-17 DIAGNOSIS — Z13.220 SCREENING FOR LIPID DISORDERS: ICD-10-CM

## 2021-03-17 PROBLEM — J30.89 ALLERGIC RHINITIS DUE TO MOLD: Status: RESOLVED | Noted: 2021-02-01 | Resolved: 2021-03-17

## 2021-03-17 PROBLEM — J30.1 SEASONAL ALLERGIC RHINITIS DUE TO POLLEN: Status: RESOLVED | Noted: 2018-03-20 | Resolved: 2021-03-17

## 2021-03-17 PROBLEM — J01.01 ACUTE RECURRENT MAXILLARY SINUSITIS: Status: RESOLVED | Noted: 2018-03-20 | Resolved: 2021-03-17

## 2021-03-17 PROBLEM — L40.9 PSORIASIS: Status: ACTIVE | Noted: 2021-03-17

## 2021-03-17 PROBLEM — J30.81 ALLERGIC RHINITIS DUE TO ANIMALS: Status: RESOLVED | Noted: 2021-02-01 | Resolved: 2021-03-17

## 2021-03-17 PROBLEM — M54.2 NECK PAIN: Status: RESOLVED | Noted: 2018-04-03 | Resolved: 2021-03-17

## 2021-03-17 PROBLEM — Z88.9 H/O MULTIPLE ALLERGIES: Status: ACTIVE | Noted: 2021-03-17

## 2021-03-17 PROBLEM — J34.89 NASAL OBSTRUCTION: Status: RESOLVED | Noted: 2021-02-24 | Resolved: 2021-03-17

## 2021-03-17 PROBLEM — G47.33 OBSTRUCTIVE SLEEP APNEA SYNDROME: Status: ACTIVE | Noted: 2021-03-17

## 2021-03-17 LAB
% GRANULOCYTES: 51.5 %G (ref 40–75)
ALBUMIN SERPL-MCNC: 3.7 G/DL (ref 3.3–4.6)
ALP SERPL-CCNC: 87 U/L (ref 40–150)
ALT SERPL-CCNC: 48 U/L (ref 0–70)
AST SERPL-CCNC: 40 U/L (ref 0–55)
BILIRUB SERPL-MCNC: 0.8 MG/DL (ref 0.2–1.3)
BUN SERPL-MCNC: 10 MG/DL (ref 5–24)
CALCIUM SERPL-MCNC: 9.6 MG/DL (ref 8.5–10.4)
CHLORIDE SERPLBLD-SCNC: 106 MMOL/L (ref 94–109)
CHOLEST SERPL-MCNC: 162 MG/DL (ref 0–200)
CHOLEST/HDLC SERPL: 5.1 {RATIO} (ref 0–5)
CO2 SERPL-SCNC: 28 MMOL/L (ref 20–32)
CREAT SERPL-MCNC: 0.9 MG/DL (ref 0.8–1.5)
EGFR CALCULATED (BLACK REFERENCE): 123.5
EGFR CALCULATED (NON BLACK REFERENCE): 102.1
ERYTHROCYTE [DISTWIDTH] IN BLOOD BY AUTOMATED COUNT: 13.1 %
FASTING SPECIMEN: YES
GLUCOSE SERPL-MCNC: 116 MG/DL (ref 60–99)
GRANULOCYTES #: 3.9 K/UL (ref 1.6–8.3)
HBA1C MFR BLD: 5.2 % (ref 4.1–5.7)
HCT VFR BLD AUTO: 47 % (ref 40–53)
HDLC SERPL-MCNC: 32 MG/DL
HEMOGLOBIN: 16 G/DL (ref 13.3–17.7)
LDLC SERPL CALC-MCNC: 76 MG/DL (ref 0–129)
LYMPHOCYTES # BLD AUTO: 3.1 K/UL (ref 0.8–5.3)
LYMPHOCYTES NFR BLD AUTO: 39.8 %L (ref 20–48)
MCH RBC QN AUTO: 30.9 PG (ref 26.5–35)
MCHC RBC AUTO-ENTMCNC: 34 G/DL (ref 32–36)
MCV RBC AUTO: 90.7 FL (ref 78–100)
MID #: 0.7 K/UL (ref 0–2.2)
MID %: 8.7 %M (ref 0–20)
PLATELET # BLD AUTO: 321 K/UL (ref 150–450)
POTASSIUM SERPL-SCNC: 4 MMOL/L (ref 3.4–5.3)
PROT SERPL-MCNC: 7.2 G/DL (ref 6.8–8.8)
RBC # BLD AUTO: 5.18 M/UL (ref 4.4–5.9)
SODIUM SERPL-SCNC: 140 MMOL/L (ref 137.3–146.3)
TRIGL SERPL-MCNC: 272 MG/DL (ref 0–150)
TSH SERPL DL<=0.005 MIU/L-ACNC: 0.57 MU/L (ref 0.4–4)
VLDL-CHOLESTEROL: 54 (ref 7–32)
WBC # BLD AUTO: 7.7 K/UL (ref 4–11)

## 2021-03-17 RX ORDER — PRAZOSIN HYDROCHLORIDE 5 MG/1
CAPSULE ORAL
Qty: 90 CAPSULE | Refills: 0 | COMMUNITY
Start: 2021-03-17 | End: 2023-04-08

## 2021-03-17 RX ORDER — MIRTAZAPINE 15 MG/1
TABLET, FILM COATED ORAL
Qty: 90 TABLET | Refills: 0 | COMMUNITY
Start: 2021-03-17 | End: 2021-12-07

## 2021-03-17 ASSESSMENT — ANXIETY QUESTIONNAIRES
3. WORRYING TOO MUCH ABOUT DIFFERENT THINGS: SEVERAL DAYS
GAD7 TOTAL SCORE: 8
IF YOU CHECKED OFF ANY PROBLEMS ON THIS QUESTIONNAIRE, HOW DIFFICULT HAVE THESE PROBLEMS MADE IT FOR YOU TO DO YOUR WORK, TAKE CARE OF THINGS AT HOME, OR GET ALONG WITH OTHER PEOPLE: SOMEWHAT DIFFICULT
6. BECOMING EASILY ANNOYED OR IRRITABLE: NOT AT ALL
5. BEING SO RESTLESS THAT IT IS HARD TO SIT STILL: NOT AT ALL
7. FEELING AFRAID AS IF SOMETHING AWFUL MIGHT HAPPEN: SEVERAL DAYS
1. FEELING NERVOUS, ANXIOUS, OR ON EDGE: MORE THAN HALF THE DAYS
2. NOT BEING ABLE TO STOP OR CONTROL WORRYING: MORE THAN HALF THE DAYS

## 2021-03-17 ASSESSMENT — PATIENT HEALTH QUESTIONNAIRE - PHQ9
5. POOR APPETITE OR OVEREATING: MORE THAN HALF THE DAYS
SUM OF ALL RESPONSES TO PHQ QUESTIONS 1-9: 9

## 2021-03-17 ASSESSMENT — MIFFLIN-ST. JEOR: SCORE: 1828.03

## 2021-03-17 NOTE — NURSING NOTE
"35 year old  Chief Complaint   Patient presents with     Physical     35 yrs old fasting        Blood pressure 108/71, pulse 109, temperature 97.9  F (36.6  C), temperature source Temporal, resp. rate 16, height 1.778 m (5' 10\"), weight 88.7 kg (195 lb 8 oz), SpO2 94 %. Body mass index is 28.05 kg/m .  Patient Active Problem List   Diagnosis     Strain of neck muscle, initial encounter     Acute recurrent maxillary sinusitis     Anxiety     History of migraine     Seasonal allergic rhinitis due to pollen     Neck pain     Upper back pain     Cervicalgia     Recurrent major depressive disorder, in partial remission (H)     Trauma and stressor-related disorder     Allergic rhinitis due to animals     Allergic rhinitis due to mold     Hypertrophy of inferior nasal turbinate     Deviated nasal septum     Nasal obstruction       Wt Readings from Last 2 Encounters:   03/17/21 88.7 kg (195 lb 8 oz)   02/24/21 83.9 kg (185 lb)     BP Readings from Last 3 Encounters:   03/17/21 108/71   02/02/21 117/78   01/26/21 128/74         Current Outpatient Medications   Medication     Cholecalciferol (VITAMIN D PO)     Creatine Monohydrate POWD     dicyclomine (BENTYL) 10 MG capsule     DULoxetine (CYMBALTA) 60 MG capsule     hyoscyamine (LEVSIN/SL) 0.125 MG sublingual tablet     LORazepam (ATIVAN) 1 MG tablet     mirtazapine (REMERON) 30 MG tablet     multivitamin w/minerals (THERA-VIT-M) tablet     ORDER FOR ALLERGEN IMMUNOTHERAPY     ORDER FOR ALLERGEN IMMUNOTHERAPY     prazosin (MINIPRESS) 5 MG capsule     sildenafil (VIAGRA) 100 MG tablet     SKYRIZI, 150 MG DOSE, 75 MG/0.83ML subcutaneous     study - emtricitabine-tenofovir 200-300, IDS#[id number], (TRUVADA) per tablet     Current Facility-Administered Medications   Medication     hydrocortisone (CORTAID) 1 % cream       Social History     Tobacco Use     Smoking status: Never Smoker     Smokeless tobacco: Never Used   Substance Use Topics     Alcohol use: Yes     Frequency: 2-3 times " a week     Drinks per session: 1 or 2     Binge frequency: Less than monthly     Comment: social     Drug use: No       Health Maintenance Due   Topic Date Due     ADVANCE CARE PLANNING  Never done     PHQ-9  11/19/2020       No results found for: PAP      March 17, 2021 8:13 AM

## 2021-03-18 ASSESSMENT — ANXIETY QUESTIONNAIRES: GAD7 TOTAL SCORE: 8

## 2021-03-20 RX ORDER — FAMOTIDINE 20 MG
2 TABLET ORAL DAILY
Qty: 90 CAPSULE | Refills: 3 | COMMUNITY
Start: 2021-03-20 | End: 2023-12-13

## 2021-03-22 ENCOUNTER — ALLIED HEALTH/NURSE VISIT (OUTPATIENT)
Dept: ALLERGY | Facility: CLINIC | Age: 36
End: 2021-03-22
Payer: COMMERCIAL

## 2021-03-22 DIAGNOSIS — J30.1 SEASONAL ALLERGIC RHINITIS DUE TO POLLEN: Primary | ICD-10-CM

## 2021-03-22 PROCEDURE — 95117 IMMUNOTHERAPY INJECTIONS: CPT

## 2021-03-22 PROCEDURE — 99207 PR DROP WITH A PROCEDURE: CPT

## 2021-03-29 ENCOUNTER — ALLIED HEALTH/NURSE VISIT (OUTPATIENT)
Dept: ALLERGY | Facility: CLINIC | Age: 36
End: 2021-03-29
Payer: COMMERCIAL

## 2021-03-29 DIAGNOSIS — J30.1 SEASONAL ALLERGIC RHINITIS DUE TO POLLEN: Primary | ICD-10-CM

## 2021-03-29 PROCEDURE — 99207 PR DROP WITH A PROCEDURE: CPT

## 2021-03-29 PROCEDURE — 95117 IMMUNOTHERAPY INJECTIONS: CPT

## 2021-04-05 ENCOUNTER — ALLIED HEALTH/NURSE VISIT (OUTPATIENT)
Dept: ALLERGY | Facility: CLINIC | Age: 36
End: 2021-04-05
Payer: COMMERCIAL

## 2021-04-05 DIAGNOSIS — J30.1 SEASONAL ALLERGIC RHINITIS DUE TO POLLEN: Primary | ICD-10-CM

## 2021-04-05 PROCEDURE — 95117 IMMUNOTHERAPY INJECTIONS: CPT

## 2021-04-05 PROCEDURE — 99207 PR DROP WITH A PROCEDURE: CPT

## 2021-04-12 ENCOUNTER — ALLIED HEALTH/NURSE VISIT (OUTPATIENT)
Dept: ALLERGY | Facility: CLINIC | Age: 36
End: 2021-04-12
Payer: COMMERCIAL

## 2021-04-12 DIAGNOSIS — J30.1 SEASONAL ALLERGIC RHINITIS DUE TO POLLEN: Primary | ICD-10-CM

## 2021-04-12 PROCEDURE — 99207 PR DROP WITH A PROCEDURE: CPT

## 2021-04-12 PROCEDURE — 95117 IMMUNOTHERAPY INJECTIONS: CPT

## 2021-04-23 ENCOUNTER — OFFICE VISIT (OUTPATIENT)
Dept: PLASTIC SURGERY | Facility: CLINIC | Age: 36
End: 2021-04-23
Payer: COMMERCIAL

## 2021-04-23 DIAGNOSIS — Z41.1 ENCOUNTER FOR COSMETIC PROCEDURE: Primary | ICD-10-CM
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
## 2021-04-25 NOTE — NURSING NOTE
Updated 2D photos and 3D photodocumentation obtained.    Gave pt a cosmetic quote for Dorsal Hump Reduction(see Media tab).    Discussed quote in great detail with pt, including the fact that the Anesthesia and Facility fees are an estimate based on time and may be subject to change.    Pt states he definitely wants to pursue the cosmetic dorsal hump reduction during nasal surgery.    Katelin Hatfield RN  4/25/2021 1:44 PM

## 2021-04-26 ENCOUNTER — PREP FOR PROCEDURE (OUTPATIENT)
Dept: OTOLARYNGOLOGY | Facility: CLINIC | Age: 36
End: 2021-04-26

## 2021-05-10 ENCOUNTER — TELEPHONE (OUTPATIENT)
Dept: OTOLARYNGOLOGY | Facility: CLINIC | Age: 36
End: 2021-05-10

## 2021-05-10 ENCOUNTER — ALLIED HEALTH/NURSE VISIT (OUTPATIENT)
Dept: ALLERGY | Facility: CLINIC | Age: 36
End: 2021-05-10
Payer: COMMERCIAL

## 2021-05-10 DIAGNOSIS — J30.1 SEASONAL ALLERGIC RHINITIS DUE TO POLLEN: Primary | ICD-10-CM

## 2021-05-10 PROCEDURE — 95117 IMMUNOTHERAPY INJECTIONS: CPT

## 2021-05-10 PROCEDURE — 99207 PR DROP WITH A PROCEDURE: CPT

## 2021-05-10 NOTE — TELEPHONE ENCOUNTER
I reached out to [facility] to obtain records that indicate pt no longer meets criteria for CPAP therapy.    Per pt, he was told by [facility] that he no longer needs to wear the CPAP and he has since returned the machine.    I left a voicemail requesting the documentation from his most recent visit be faxed to me.    If CPAP therapy discontinued, pt would be an appropriate candidate for nasal surgery at the [facility] with Dr. Coulter.    Katelin Hatfield RN  5/10/2021 4:48 PM

## 2021-05-17 NOTE — TELEPHONE ENCOUNTER
Records Requested  05/17/21    Facility  [facility]  Fax: [phone]  Phone: [phone]   Outcome * 5/17/21 10:13 AM Called out to [facility] to have records sent to us. They gave me the medical records fax number so request sent to them for the records. - Marianela     * 5/27/21 10:59 AM LVM with [facility] to ask on status update on the records. - Marianela    * 6/1/21 12:42 PM Received message from [facility] stating they have no records or notes on CPAP and the patient only had a sleep study done with them.  Faxed urgent req to [facility] for Sleep Study notes. - Marianela

## 2021-05-19 ENCOUNTER — TELEPHONE (OUTPATIENT)
Dept: OTOLARYNGOLOGY | Facility: CLINIC | Age: 36
End: 2021-05-19

## 2021-05-19 NOTE — TELEPHONE ENCOUNTER
Called pt to let him know that efforts have been made to retrieve his records from [facility], documenting that pt no longer needs CPAP therapy.    I called last week to request the records and a second request was made yesterday.    I told pt that I would call him as soon as we've received the records.    Pt agreeable to this plan.    Katelin Hatfield RN  5/19/2021 12:55 PM

## 2021-06-04 PROBLEM — J34.89 NASAL OBSTRUCTION: Status: ACTIVE | Noted: 2021-02-26

## 2021-06-07 ENCOUNTER — ALLIED HEALTH/NURSE VISIT (OUTPATIENT)
Dept: ALLERGY | Facility: CLINIC | Age: 36
End: 2021-06-07
Payer: COMMERCIAL

## 2021-06-07 DIAGNOSIS — J30.1 SEASONAL ALLERGIC RHINITIS DUE TO POLLEN: Primary | ICD-10-CM

## 2021-06-07 PROCEDURE — 95117 IMMUNOTHERAPY INJECTIONS: CPT

## 2021-06-07 PROCEDURE — 99207 PR DROP WITH A PROCEDURE: CPT

## 2021-06-15 DIAGNOSIS — Z11.59 ENCOUNTER FOR SCREENING FOR OTHER VIRAL DISEASES: ICD-10-CM

## 2021-06-20 NOTE — LETTER
Letter by Trisha Jacobson RN at      Author: Trisha Jacobson RN Service: -- Author Type: --    Filed:  Encounter Date: 7/7/2020 Status: (Other)       7/7/2020        Juarez Jauregui  [address]  [place] [address]    This letter provides a written record that you were tested for COVID-19 on 7/5/20.     Your result was negative. This means that we didnt find the virus that causes COVID-19 in your sample. A test may show negative when you do actually have the virus. This can happen when the virus is in the early stages of infection, before you feel illness symptoms.    If you have symptoms   Stay home and away from others (self-isolate) until you meet ALL of the guidelines below:    Youve had no fever--and no medicine that reduces fever--for 3 full days (72 hours). And ?    Your other symptoms have gotten better. For example, your cough or breathing has improved. And?    At least 10 days have passed since your symptoms started.    During this time:    Stay home. Dont go to work, school or anywhere else.     Stay in your own room, including for meals. Use your own bathroom if you can.    Stay away from others in your home. No hugging, kissing or shaking hands. No visitors.    Clean high touch surfaces often (doorknobs, counters, handles, etc.). Use a household cleaning spray or wipes. You can find a full list on the EPA website at www.epa.gov/pesticide-registration/list-n-disinfectants-use-against-sars-cov-2.    Cover your mouth and nose with a mask, tissue or washcloth to avoid spreading germs.    Wash your hands and face often with soap and water.    Going back to work  Check with your employer for any guidelines to follow for going back to work.    Employers: This document serves as formal notice that your employee tested negative for COVID-19, as of the testing date shown above.

## 2021-06-22 ENCOUNTER — OFFICE VISIT (OUTPATIENT)
Dept: FAMILY MEDICINE | Facility: CLINIC | Age: 36
End: 2021-06-22
Payer: COMMERCIAL

## 2021-06-22 VITALS
HEIGHT: 70 IN | SYSTOLIC BLOOD PRESSURE: 124 MMHG | DIASTOLIC BLOOD PRESSURE: 90 MMHG | OXYGEN SATURATION: 96 % | BODY MASS INDEX: 28.75 KG/M2 | RESPIRATION RATE: 14 BRPM | WEIGHT: 200.8 LBS | TEMPERATURE: 97.3 F | HEART RATE: 97 BPM

## 2021-06-22 DIAGNOSIS — R20.2 PARESTHESIAS: ICD-10-CM

## 2021-06-22 DIAGNOSIS — R29.898 WEAKNESS OF LEFT ARM: ICD-10-CM

## 2021-06-22 DIAGNOSIS — R42 DIZZINESS: Primary | ICD-10-CM

## 2021-06-22 DIAGNOSIS — M79.2 NEUROPATHIC PAIN: ICD-10-CM

## 2021-06-22 RX ORDER — AMOXICILLIN 500 MG
1200 CAPSULE ORAL AT BEDTIME
COMMUNITY
End: 2023-12-13

## 2021-06-22 ASSESSMENT — MIFFLIN-ST. JEOR: SCORE: 1847.07

## 2021-06-22 NOTE — PATIENT INSTRUCTIONS
Your provider has referred you for the following:   Consult at [facility]: [facility] - [place] [phone]
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
## 2021-06-28 ASSESSMENT — ANXIETY QUESTIONNAIRES
3. WORRYING TOO MUCH ABOUT DIFFERENT THINGS: MORE THAN HALF THE DAYS
2. NOT BEING ABLE TO STOP OR CONTROL WORRYING: MORE THAN HALF THE DAYS
GAD7 TOTAL SCORE: 11
7. FEELING AFRAID AS IF SOMETHING AWFUL MIGHT HAPPEN: SEVERAL DAYS
1. FEELING NERVOUS, ANXIOUS, OR ON EDGE: NEARLY EVERY DAY
5. BEING SO RESTLESS THAT IT IS HARD TO SIT STILL: SEVERAL DAYS
6. BECOMING EASILY ANNOYED OR IRRITABLE: NOT AT ALL
IF YOU CHECKED OFF ANY PROBLEMS ON THIS QUESTIONNAIRE, HOW DIFFICULT HAVE THESE PROBLEMS MADE IT FOR YOU TO DO YOUR WORK, TAKE CARE OF THINGS AT HOME, OR GET ALONG WITH OTHER PEOPLE: SOMEWHAT DIFFICULT

## 2021-06-29 ASSESSMENT — ANXIETY QUESTIONNAIRES: GAD7 TOTAL SCORE: 11

## 2021-07-02 ENCOUNTER — ANCILLARY PROCEDURE (OUTPATIENT)
Dept: MRI IMAGING | Facility: CLINIC | Age: 36
End: 2021-07-02
Attending: INTERNAL MEDICINE
Payer: COMMERCIAL

## 2021-07-02 DIAGNOSIS — R29.898 WEAKNESS OF LEFT ARM: ICD-10-CM

## 2021-07-02 DIAGNOSIS — M79.2 NEUROPATHIC PAIN: ICD-10-CM

## 2021-07-02 DIAGNOSIS — R20.2 PARESTHESIAS: ICD-10-CM

## 2021-07-02 DIAGNOSIS — R42 DIZZINESS: ICD-10-CM

## 2021-07-02 PROCEDURE — 70553 MRI BRAIN STEM W/O & W/DYE: CPT | Mod: TC | Performed by: RADIOLOGY

## 2021-07-02 PROCEDURE — A9585 GADOBUTROL INJECTION: HCPCS | Performed by: RADIOLOGY

## 2021-07-02 RX ORDER — GADOBUTROL 604.72 MG/ML
10 INJECTION INTRAVENOUS ONCE
Status: COMPLETED | OUTPATIENT
Start: 2021-07-02 | End: 2021-07-02

## 2021-07-02 RX ADMIN — GADOBUTROL 9 ML: 604.72 INJECTION INTRAVENOUS at 14:45

## 2021-07-04 NOTE — PROGRESS NOTES
Patient came in for 2 Allergy injection. Patient had no reaction to last shots. I gave 0.5 ml of the red bottle subcutaneous without any issues.   Patient instructed to wait in building for at least 30 min. after injections  Patients comes into clinic today at the request of  for allergy injections     No reaction to last injection.     This service provided today was under the direct supervision of  , who was available if needed.     Carmen Paniagua RN       
Yes

## 2021-07-06 ENCOUNTER — ALLIED HEALTH/NURSE VISIT (OUTPATIENT)
Dept: ALLERGY | Facility: CLINIC | Age: 36
End: 2021-07-06
Payer: COMMERCIAL

## 2021-07-06 DIAGNOSIS — J30.1 SEASONAL ALLERGIC RHINITIS DUE TO POLLEN: Primary | ICD-10-CM

## 2021-07-06 PROCEDURE — 95117 IMMUNOTHERAPY INJECTIONS: CPT

## 2021-07-19 DIAGNOSIS — R20.2 PARESTHESIA: Primary | ICD-10-CM

## 2021-07-20 ENCOUNTER — LAB (OUTPATIENT)
Dept: LAB | Facility: CLINIC | Age: 36
End: 2021-07-20
Payer: COMMERCIAL

## 2021-07-20 DIAGNOSIS — R20.2 PARESTHESIA: ICD-10-CM

## 2021-07-21 LAB — B BURGDOR IGG+IGM SER QL: 1.36

## 2021-07-23 LAB
B BURGDOR IGG SER QL IB: NEGATIVE
B BURGDOR IGM SER QL IB: NEGATIVE

## 2021-08-02 ENCOUNTER — ALLIED HEALTH/NURSE VISIT (OUTPATIENT)
Dept: ALLERGY | Facility: CLINIC | Age: 36
End: 2021-08-02
Payer: COMMERCIAL

## 2021-08-02 DIAGNOSIS — J30.81 ALLERGIC RHINITIS DUE TO ANIMALS: ICD-10-CM

## 2021-08-02 DIAGNOSIS — J30.1 SEASONAL ALLERGIC RHINITIS DUE TO POLLEN: ICD-10-CM

## 2021-08-02 DIAGNOSIS — J30.89 ALLERGIC RHINITIS DUE TO MOLD: ICD-10-CM

## 2021-08-02 DIAGNOSIS — J30.1 SEASONAL ALLERGIC RHINITIS DUE TO POLLEN: Primary | ICD-10-CM

## 2021-08-02 PROCEDURE — 95117 IMMUNOTHERAPY INJECTIONS: CPT

## 2021-08-02 NOTE — PROGRESS NOTES
Patient presented after waiting 30 minutes with no reaction to allergy injections. Discharged from clinic.      Renata ATYLOR RN Specialty Triage 8/2/2021 12:59 PM

## 2021-08-09 ENCOUNTER — OFFICE VISIT (OUTPATIENT)
Dept: NEUROLOGY | Facility: CLINIC | Age: 36
End: 2021-08-09
Attending: INTERNAL MEDICINE
Payer: COMMERCIAL

## 2021-08-09 VITALS — OXYGEN SATURATION: 98 % | DIASTOLIC BLOOD PRESSURE: 83 MMHG | HEART RATE: 121 BPM | SYSTOLIC BLOOD PRESSURE: 120 MMHG

## 2021-08-09 DIAGNOSIS — R29.818 DIFFICULTY BALANCING: ICD-10-CM

## 2021-08-09 DIAGNOSIS — R20.0 NUMBNESS AND TINGLING IN LEFT HAND: ICD-10-CM

## 2021-08-09 DIAGNOSIS — R20.2 PARESTHESIAS: Primary | ICD-10-CM

## 2021-08-09 DIAGNOSIS — R20.2 NUMBNESS AND TINGLING IN LEFT HAND: ICD-10-CM

## 2021-08-09 PROCEDURE — G0463 HOSPITAL OUTPT CLINIC VISIT: HCPCS

## 2021-08-09 PROCEDURE — 99205 OFFICE O/P NEW HI 60 MIN: CPT | Performed by: PSYCHIATRY & NEUROLOGY
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
## 2021-08-09 NOTE — PATIENT INSTRUCTIONS
AFTER VISIT SUMMARY (AVS):    At today's visit we thoroughly discussed various diagnostic possibilities for your symptoms, necessary evaluation, and the plan, which includes:  Orders Placed This Encounter   Procedures     Physical Therapy Referral     EMG     No new medications, but we discussed that some of your symptoms might be explained by side effects from duloxetine and mirtazapine.  Please discuss with the prescribing provider if doses of these medications could be adjusted to see if it helps your symptoms.     Additional recommendations after the work-up.    Next follow-up appointment is in the next 4-6 weeks or earlier if needed.    Please do not hesitate to call me with any questions or concerns.    Thanks.
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
## 2021-08-12 DIAGNOSIS — J30.89 ALLERGIC RHINITIS DUE TO MOLD: ICD-10-CM

## 2021-08-12 DIAGNOSIS — J30.1 SEASONAL ALLERGIC RHINITIS DUE TO POLLEN: ICD-10-CM

## 2021-08-12 DIAGNOSIS — J30.81 ALLERGIC RHINITIS DUE TO ANIMALS: Primary | ICD-10-CM

## 2021-08-12 PROCEDURE — 95165 ANTIGEN THERAPY SERVICES: CPT | Performed by: ALLERGY & IMMUNOLOGY

## 2021-08-12 NOTE — PROGRESS NOTES
Allergy serums billed to [facility].     Vials billed below:    Vial Color Content                      Vial Size Expiration Date  Red 1:1                                   Cat, Grass, Trees, Weeds            5 ml 8/12/2022  Red 1:1                                   Molds                                                  5 ml 8/12/2022    Checked by Patty [address]  20 units billed    Signature  Tiffany Cassidy RN

## 2021-08-16 ENCOUNTER — OFFICE VISIT (OUTPATIENT)
Dept: FAMILY MEDICINE | Facility: CLINIC | Age: 36
End: 2021-08-16
Payer: COMMERCIAL

## 2021-08-16 VITALS
BODY MASS INDEX: 28.49 KG/M2 | SYSTOLIC BLOOD PRESSURE: 131 MMHG | DIASTOLIC BLOOD PRESSURE: 94 MMHG | WEIGHT: 199 LBS | OXYGEN SATURATION: 98 % | TEMPERATURE: 97.7 F | HEART RATE: 96 BPM | RESPIRATION RATE: 13 BRPM | HEIGHT: 70 IN

## 2021-08-16 DIAGNOSIS — Z01.818 PREOP GENERAL PHYSICAL EXAM: Primary | ICD-10-CM

## 2021-08-16 DIAGNOSIS — R20.2 PARESTHESIAS: ICD-10-CM

## 2021-08-16 ASSESSMENT — MIFFLIN-ST. JEOR: SCORE: 1838.91

## 2021-08-16 NOTE — NURSING NOTE
"36 year old  Chief Complaint   Patient presents with     Pre-Op Exam       Blood pressure (!) 131/94, pulse 96, temperature 97.7  F (36.5  C), resp. rate 13, height 1.778 m (5' 10\"), weight 90.3 kg (199 lb), SpO2 98 %. Body mass index is 28.55 kg/m .  Patient Active Problem List   Diagnosis     Strain of neck muscle, initial encounter     Anxiety     History of migraine     Upper back pain     Cervicalgia     Recurrent major depressive disorder, in partial remission (H)     Trauma and stressor-related disorder     Hypertrophy of inferior nasal turbinate     Deviated nasal septum     H/O multiple allergies     Obstructive sleep apnea syndrome     Psoriasis     Nasal obstruction       Wt Readings from Last 2 Encounters:   08/16/21 90.3 kg (199 lb)   06/22/21 91.1 kg (200 lb 12.8 oz)     BP Readings from Last 3 Encounters:   08/16/21 (!) 131/94   08/09/21 120/83   06/22/21 (!) 124/90         Current Outpatient Medications   Medication     Creatine Monohydrate POWD     dicyclomine (BENTYL) 10 MG capsule     DULoxetine (CYMBALTA) 60 MG capsule     hyoscyamine (LEVSIN/SL) 0.125 MG sublingual tablet     LORazepam (ATIVAN) 1 MG tablet     medical cannabis (Patient's own supply)     mirtazapine (REMERON) 15 MG tablet     multivitamin w/minerals (THERA-VIT-M) tablet     Omega-3 Fatty Acids (FISH OIL) 1200 MG capsule     ORDER FOR ALLERGEN IMMUNOTHERAPY     ORDER FOR ALLERGEN IMMUNOTHERAPY     prazosin (MINIPRESS) 5 MG capsule     sildenafil (VIAGRA) 100 MG tablet     SKYRIZI, 150 MG DOSE, 75 MG/0.83ML subcutaneous     study - emtricitabine-tenofovir 200-300, IDS#[id number], (TRUVADA) per tablet     Vitamin D, Cholecalciferol, 25 MCG (1000 UT) CAPS     Current Facility-Administered Medications   Medication     hydrocortisone (CORTAID) 1 % cream       Social History     Tobacco Use     Smoking status: Never Smoker     Smokeless tobacco: Never Used   Substance Use Topics     Alcohol use: Yes     Comment: social     Drug use: No "       Health Maintenance Due   Topic Date Due     ADVANCE CARE PLANNING  Never done       No results found for: PAP      August 16, 2021 3:09 PM
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
## 2021-08-16 NOTE — PATIENT INSTRUCTIONS
Miralax take daily while taking the narcotic to prevent constipation   Hold any vitamins, herbs, supplements and NSAID starting now and resume after surgery.    Preparing for Your Surgery  Getting started  A nurse will call you to review your health history and instructions. They will give you an arrival time based on your scheduled surgery time.  Please be ready to share the following:    Your doctor's clinic name and phone number    Your medical, surgical and anesthesia history    A list of allergies and sensitivities    A list of medicines, including herbal treatments and over-the-counter drugs    Whether the patient has a legal guardian (ask how to send us the papers in advance)  If you have a child who's having surgery, please ask for a copy of Preparing for Your Child's Surgery.    Preparing for surgery    Within 30 days of surgery: Have a pre-op exam (sometimes called an H&P, or History and Physical). This can be done at a clinic or pre-operative center.  ? If you're having a , you may not need this exam. Talk to your care team    At your pre-op exam, talk to your care team about all medicines you take. If you need to stop any medicines before surgery, ask when to start taking them again.  ? We do this for your safety. Many medicines can make you bleed too much during surgery. Some change how well surgery (anesthesia) drugs work.    Call your insurance company to let them know you're having surgery. (If you don't have insurance, call [phone].)    Call your clinic if there's any change in your health. This includes signs of a cold or flu (sore throat, runny nose, cough, rash, fever). It also includes a scrape or scratch near the surgery site.    If you have questions on the day of surgery, call your hospital or surgery center.  Eating and drinking guidelines  For your safety: Unless your surgeon tells you otherwise, follow the guidelines below.    Eat and drink as usual until 8 hours before  surgery. After that, no food or milk.    Drink clear liquids until 2 hours before surgery. These are liquids you can see through, like water, Gatorade and Propel Water. You may also have black coffee and tea (no cream or milk).    Nothing by mouth within 2 hours of surgery. This includes gum, candy and breath mints.    If you drink, stop drinking alcohol the night before surgery.    If your care team tells you to take medicine on the morning of surgery, it's okay to take it with a sip of water.  Preventing infection    Shower or bathe the night before and morning of your surgery. Follow the instructions your clinic gave you. (If no instructions, use regular soap.)    Don't shave or clip hair near your surgery site. We'll remove the hair if needed.    Don't smoke or vape the morning of surgery. You may chew nicotine gum up to 2 hours before surgery. A nicotine patch is okay.  ? Note: Some surgeries require you to completely quit smoking and nicotine. Check with your surgeon.    Your care team will make every effort to keep you safe from infection. We will:  ? Clean our hands often with soap and water (or an alcohol-based hand rub).  ? Clean the skin at your surgery site with a special soap that kills germs.  ? Give you a special gown to keep you warm. (Cold raises the risk of infection.)  ? Wear special hair covers, masks, gowns and gloves during surgery.  ? Give antibiotic medicine, if prescribed. Not all surgeries need antibiotics.  What to bring on the day of surgery    Photo ID and insurance card    Copy of your health care directive, if you have one    Glasses and hearing aides (bring cases)  ? You can't wear contacts during surgery    Inhaler and eye drops, if you use them (tell us about these when you arrive)    CPAP machine or breathing device, if you use them    A few personal items, if spending the night    If you have . . .  ? A pacemaker or ICD (cardiac defibrillator): Bring the ID card.  ? An implanted  stimulator: Bring the remote control.  ? A legal guardian: Bring a copy of the certified (court-stamped) guardianship papers.  Please remove any jewelry, including body piercings. Leave jewelry and other valuables at home.  If you're going home the day of surgery  Important: If you don't follow the rules below, we must cancel your surgery.     Arrange for someone to drive you home after surgery. You may not drive, take a taxi or take public transportation by yourself (unless you'll have local anesthesia only).    Arrange for a responsible adult to stay with you overnight. If you don't, we may keep you in the hospital overnight, and you may need to pay the costs yourself.  Questions?   If you have any questions for your care team, list them here: _________________________________________________________________________________________________________________________________________________________________________________________________________________________________________________________________________________________________________________________  For informational purposes only. Not to replace the advice of your health care provider. Copyright   2003, 2019 [facility]. All rights reserved. Clinically reviewed by Emmy Magdaleno MD. [employer] [id number] - REV 4/20.

## 2021-08-17 ENCOUNTER — TELEPHONE (OUTPATIENT)
Dept: OTOLARYNGOLOGY | Facility: CLINIC | Age: 36
End: 2021-08-17

## 2021-08-17 NOTE — TELEPHONE ENCOUNTER
Called pt to collect surgeon's fee for Cosmetic Dorsal Hump Reduction with Dr. Coulter on 8/24/21 at [place].    I have been in contact with Edouard Pierce with financial services to determine cosmetic pricing for [facility].      Left vm with my direct dial for call back.    Katelin Hatfield RN  8/17/2021 11:33 AM

## 2021-08-17 NOTE — TELEPHONE ENCOUNTER
ALLERGY SOLUTION RE-ORDER REQUEST    Juarez Jauregui 1985 MRN: [id number]    DATE NEEDED:  2 weeks  Vial Color Content    Vial Size  Red 1:1 Cat, Grass, Trees, Weeds    5 ml  Red 1:1 Molds   5 ml    Serum reorder consent signed and patient/parent was advised that new serums would be ordered through the pharmacy and billed to their insurance company when they arrive in clinic. Yes    Sh[facility] Clinic Location:  [facility]  Ship to Location: [facility]  Serum billed to:  [facility]    Special Instructions:  NA        Requester Signature  Radha CROSS MA    
Allergy serums received at [facility].     Vials received below:    Vial Color Content                      Vial Size Expiration Date  Red 1:1 Molds 5 mL  8/12/2022  Red 1:1 Cat, Grass, Trees, Weeds 5 mL  8/12/2022        Signature  Radha CROSS MA        
Fast Absorbing Plain Gut/interrupted

## 2021-08-20 NOTE — OR NURSING
Incomplete H&P/ Dizziness & neuro symptoms  Received: Today  Renee Thorne RN Pingel, Danielle Valadez MD; Yohana Coulter MD; [facility] Anesthesiology  Dear Dr. Mitchell,     On chart review, I noted that the PreOp physical from the  on 8/16/21 is incomplete, and I saw your notes regarding concerning neurologic symptoms. Could you please complete the notes as soon as you are able, and respond to the team (Dr. Coulter & Anesthesia, cc'd here) regarding any concerns ahead of the procedure?     PAS Notification: Your patient is scheduled for surgery in 2 days. Critical chart components are missing. If the required components are not completed in EPIC by noon the day prior to surgery your case may be rescheduled. Thank you in advance for completing the record and improving [facility]'s quality of care.     Surgeon's Name: Dr. Coulter   Date of Surgery: 8/24/21   Procedure: Septorhinoplasty, Cosmetic Dorsal Hump Reduction     Missing Components:   H&P within 30 days   H&P signature       Thank you,     Umu Thorne RN   PreAdmission Screening   [facility]

## 2021-08-20 NOTE — OR NURSING
Update per PCP re: PreOp H&P:    RE: Incomplete H&P/ Dizziness & neuro symptoms  Received: Today  Danielle Mitchell MD Dalebroux, Deborah E, RN  Thank you   The preop note has been completed.     Danielle Mitchell MD   Internal Medicine/Pediatrics   --    Umu Thorne RN  PreAdmission Screening  [facility]

## 2021-08-21 ENCOUNTER — LAB (OUTPATIENT)
Dept: LAB | Facility: CLINIC | Age: 36
End: 2021-08-21
Payer: COMMERCIAL

## 2021-08-21 DIAGNOSIS — Z11.59 ENCOUNTER FOR SCREENING FOR OTHER VIRAL DISEASES: ICD-10-CM

## 2021-08-21 PROCEDURE — U0005 INFEC AGEN DETEC AMPLI PROBE: HCPCS

## 2021-08-21 PROCEDURE — U0003 INFECTIOUS AGENT DETECTION BY NUCLEIC ACID (DNA OR RNA); SEVERE ACUTE RESPIRATORY SYNDROME CORONAVIRUS 2 (SARS-COV-2) (CORONAVIRUS DISEASE [COVID-19]), AMPLIFIED PROBE TECHNIQUE, MAKING USE OF HIGH THROUGHPUT TECHNOLOGIES AS DESCRIBED BY CMS-2020-01-R: HCPCS

## 2021-08-22 LAB — SARS-COV-2 RNA RESP QL NAA+PROBE: NEGATIVE

## 2021-08-23 ENCOUNTER — ANESTHESIA EVENT (OUTPATIENT)
Dept: SURGERY | Facility: CLINIC | Age: 36
End: 2021-08-23
Payer: COMMERCIAL

## 2021-08-23 ENCOUNTER — TELEPHONE (OUTPATIENT)
Dept: OTOLARYNGOLOGY | Facility: CLINIC | Age: 36
End: 2021-08-23

## 2021-08-23 DIAGNOSIS — Z11.59 ENCOUNTER FOR SCREENING FOR OTHER VIRAL DISEASES: ICD-10-CM

## 2021-08-23 NOTE — TELEPHONE ENCOUNTER
Ricardo called back regarding surgery with Dr. Win Sen. Explained that we do need to postpone surgery for tomorrow. After discussing with Dr. Win Sen and OR team, we are able to find new date of 8/31/2021. Apologized for the late notice and expressed emphathy with patient for the last minute cancellation.  Patient will not need a new pre-op but will need another covid-19 test. Scheduled for 8/27 at [facility]. Provided address to patient.     Patient inquired regarding MRI done. Patient states that there was findings in patients sinuses on MRI, but primary unsure. Pt is wondering if Dr. Win Sen can look at this during surgery. Explained that depending on what the findings were, Dr. Win Sen may or may not be able to address this. Explained that writer will pass message along to Dr. Win Sen. Images are are in patient chart. MRI from July 2021.     Patient knows that arrival time will likely be the same.     Kenna Hughes on 8/23/2021 at 11:55 AM

## 2021-08-23 NOTE — TELEPHONE ENCOUNTER
Left message for patient regarding surgery tomorrow with Dr. Win Sen. Asked patient to call back PRITI Hughes on 8/23/2021 at 10:20 AM

## 2021-08-24 ENCOUNTER — ANESTHESIA (OUTPATIENT)
Dept: SURGERY | Facility: CLINIC | Age: 36
End: 2021-08-24
Payer: COMMERCIAL

## 2021-08-27 ENCOUNTER — LAB (OUTPATIENT)
Dept: URGENT CARE | Facility: URGENT CARE | Age: 36
End: 2021-08-27
Payer: COMMERCIAL

## 2021-08-27 DIAGNOSIS — Z11.59 ENCOUNTER FOR SCREENING FOR OTHER VIRAL DISEASES: ICD-10-CM

## 2021-08-27 LAB — SARS-COV-2 RNA RESP QL NAA+PROBE: NEGATIVE

## 2021-08-27 PROCEDURE — U0003 INFECTIOUS AGENT DETECTION BY NUCLEIC ACID (DNA OR RNA); SEVERE ACUTE RESPIRATORY SYNDROME CORONAVIRUS 2 (SARS-COV-2) (CORONAVIRUS DISEASE [COVID-19]), AMPLIFIED PROBE TECHNIQUE, MAKING USE OF HIGH THROUGHPUT TECHNOLOGIES AS DESCRIBED BY CMS-2020-01-R: HCPCS

## 2021-08-27 PROCEDURE — U0005 INFEC AGEN DETEC AMPLI PROBE: HCPCS

## 2021-08-30 ENCOUNTER — ALLIED HEALTH/NURSE VISIT (OUTPATIENT)
Dept: ALLERGY | Facility: CLINIC | Age: 36
End: 2021-08-30
Payer: COMMERCIAL

## 2021-08-30 DIAGNOSIS — J30.81 ALLERGIC RHINITIS DUE TO ANIMALS: Primary | ICD-10-CM

## 2021-08-30 DIAGNOSIS — J30.9 ALLERGIC RHINITIS: ICD-10-CM

## 2021-08-30 DIAGNOSIS — J30.1 SEASONAL ALLERGIC RHINITIS DUE TO POLLEN: ICD-10-CM

## 2021-08-30 DIAGNOSIS — J30.89 ALLERGIC RHINITIS DUE TO MOLD: ICD-10-CM

## 2021-08-30 PROCEDURE — 95117 IMMUNOTHERAPY INJECTIONS: CPT

## 2021-08-30 RX ORDER — ACETAMINOPHEN 325 MG/1
975 TABLET ORAL ONCE
Status: CANCELLED | OUTPATIENT
Start: 2021-08-30 | End: 2021-08-30

## 2021-08-30 RX ORDER — LIDOCAINE 40 MG/G
CREAM TOPICAL
Status: CANCELLED | OUTPATIENT
Start: 2021-08-30

## 2021-08-30 RX ORDER — SODIUM CHLORIDE, SODIUM LACTATE, POTASSIUM CHLORIDE, CALCIUM CHLORIDE 600; 310; 30; 20 MG/100ML; MG/100ML; MG/100ML; MG/100ML
INJECTION, SOLUTION INTRAVENOUS CONTINUOUS
Status: CANCELLED | OUTPATIENT
Start: 2021-08-30

## 2021-08-30 ASSESSMENT — LIFESTYLE VARIABLES: TOBACCO_USE: 1
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
## 2021-08-30 NOTE — PROGRESS NOTES
Patient presented after waiting 30 minutes with no reaction to allergy injections. Discharged from clinic.    Anthony SANCHEZ RN ............   8/30/2021...9:08 AM

## 2021-08-31 ENCOUNTER — HOSPITAL ENCOUNTER (OUTPATIENT)
Facility: CLINIC | Age: 36
Setting detail: OBSERVATION
Discharge: HOME | End: 2021-09-01
Attending: OTOLARYNGOLOGY | Admitting: OTOLARYNGOLOGY
Payer: COMMERCIAL

## 2021-08-31 DIAGNOSIS — J34.89 NASAL OBSTRUCTION: ICD-10-CM

## 2021-08-31 DIAGNOSIS — G47.33 OBSTRUCTIVE SLEEP APNEA SYNDROME: Primary | ICD-10-CM

## 2021-08-31 DIAGNOSIS — J34.2 DEVIATED NASAL SEPTUM: ICD-10-CM

## 2021-08-31 DIAGNOSIS — J34.3 HYPERTROPHY OF INFERIOR NASAL TURBINATE: ICD-10-CM

## 2021-08-31 LAB
BASE EXCESS BLDV CALC-SCNC: 0.4 MMOL/L (ref -8.1–1.9)
CA-I BLD-MCNC: 4.6 MG/DL (ref 4.4–5.2)
GLUCOSE BLD-MCNC: 123 MG/DL (ref 70–99)
GLUCOSE BLDC GLUCOMTR-MCNC: 99 MG/DL (ref 70–99)
HCO3 BLDV-SCNC: 26 MMOL/L (ref 21–28)
HGB BLD-MCNC: 14.3 G/DL (ref 13.3–17.7)
LACTATE BLD-SCNC: 2 MMOL/L
O2/TOTAL GAS SETTING VFR VENT: 50 %
OXYHGB MFR BLDV: 97 % (ref 92–100)
PCO2 BLDV: 43 MM HG (ref 40–50)
PH BLDV: 7.38 [PH] (ref 7.32–7.43)
PO2 BLDV: 102 MM HG (ref 25–47)
POTASSIUM BLD-SCNC: 4.3 MMOL/L (ref 3.5–5)
SODIUM BLD-SCNC: 139 MMOL/L (ref 133–144)

## 2021-08-31 PROCEDURE — 710N000010 HC RECOVERY PHASE 1, LEVEL 2, PER MIN: Performed by: OTOLARYNGOLOGY

## 2021-08-31 PROCEDURE — 250N000013 HC RX MED GY IP 250 OP 250 PS 637: Performed by: STUDENT IN AN ORGANIZED HEALTH CARE EDUCATION/TRAINING PROGRAM

## 2021-08-31 PROCEDURE — 82330 ASSAY OF CALCIUM: CPT

## 2021-08-31 PROCEDURE — 360N000076 HC SURGERY LEVEL 3, PER MIN: Performed by: OTOLARYNGOLOGY

## 2021-08-31 PROCEDURE — 93005 ELECTROCARDIOGRAM TRACING: CPT | Mod: 59

## 2021-08-31 PROCEDURE — 250N000009 HC RX 250: Performed by: NURSE ANESTHETIST, CERTIFIED REGISTERED

## 2021-08-31 PROCEDURE — 258N000003 HC RX IP 258 OP 636: Performed by: STUDENT IN AN ORGANIZED HEALTH CARE EDUCATION/TRAINING PROGRAM

## 2021-08-31 PROCEDURE — 83605 ASSAY OF LACTIC ACID: CPT

## 2021-08-31 PROCEDURE — C9046 COCAINE HCL NASAL SOLUTION: HCPCS | Performed by: OTOLARYNGOLOGY

## 2021-08-31 PROCEDURE — 84132 ASSAY OF SERUM POTASSIUM: CPT

## 2021-08-31 PROCEDURE — 250N000025 HC SEVOFLURANE, PER MIN: Performed by: OTOLARYNGOLOGY

## 2021-08-31 PROCEDURE — 250N000009 HC RX 250: Performed by: OTOLARYNGOLOGY

## 2021-08-31 PROCEDURE — G0378 HOSPITAL OBSERVATION PER HR: HCPCS

## 2021-08-31 PROCEDURE — 85018 HEMOGLOBIN: CPT

## 2021-08-31 PROCEDURE — 272N000001 HC OR GENERAL SUPPLY STERILE: Performed by: OTOLARYNGOLOGY

## 2021-08-31 PROCEDURE — 250N000011 HC RX IP 250 OP 636: Performed by: OTOLARYNGOLOGY

## 2021-08-31 PROCEDURE — 250N000013 HC RX MED GY IP 250 OP 250 PS 637: Performed by: OTOLARYNGOLOGY

## 2021-08-31 PROCEDURE — 84295 ASSAY OF SERUM SODIUM: CPT

## 2021-08-31 PROCEDURE — 250N000011 HC RX IP 250 OP 636: Performed by: STUDENT IN AN ORGANIZED HEALTH CARE EDUCATION/TRAINING PROGRAM

## 2021-08-31 PROCEDURE — 999N000141 HC STATISTIC PRE-PROCEDURE NURSING ASSESSMENT: Performed by: OTOLARYNGOLOGY

## 2021-08-31 PROCEDURE — 93010 ELECTROCARDIOGRAM REPORT: CPT | Performed by: INTERNAL MEDICINE

## 2021-08-31 PROCEDURE — 250N000011 HC RX IP 250 OP 636: Performed by: NURSE ANESTHETIST, CERTIFIED REGISTERED

## 2021-08-31 PROCEDURE — 250N000009 HC RX 250: Performed by: STUDENT IN AN ORGANIZED HEALTH CARE EDUCATION/TRAINING PROGRAM

## 2021-08-31 PROCEDURE — 82805 BLOOD GASES W/O2 SATURATION: CPT

## 2021-08-31 PROCEDURE — 370N000017 HC ANESTHESIA TECHNICAL FEE, PER MIN: Performed by: OTOLARYNGOLOGY

## 2021-08-31 RX ORDER — ONDANSETRON 2 MG/ML
INJECTION INTRAMUSCULAR; INTRAVENOUS PRN
Status: DISCONTINUED | OUTPATIENT
Start: 2021-08-31 | End: 2021-08-31

## 2021-08-31 RX ORDER — FENTANYL CITRATE 50 UG/ML
50 INJECTION, SOLUTION INTRAMUSCULAR; INTRAVENOUS EVERY 5 MIN PRN
Status: DISCONTINUED | OUTPATIENT
Start: 2021-08-31 | End: 2021-08-31 | Stop reason: HOSPADM

## 2021-08-31 RX ORDER — CEFAZOLIN SODIUM 2 G/100ML
2 INJECTION, SOLUTION INTRAVENOUS
Status: COMPLETED | OUTPATIENT
Start: 2021-08-31 | End: 2021-08-31

## 2021-08-31 RX ORDER — ACETAMINOPHEN 325 MG/1
975 TABLET ORAL ONCE
Status: COMPLETED | OUTPATIENT
Start: 2021-08-31 | End: 2021-08-31

## 2021-08-31 RX ORDER — OXYCODONE HYDROCHLORIDE 5 MG/1
5 TABLET ORAL EVERY 4 HOURS PRN
Status: DISCONTINUED | OUTPATIENT
Start: 2021-08-31 | End: 2021-08-31 | Stop reason: HOSPADM

## 2021-08-31 RX ORDER — ONDANSETRON 2 MG/ML
4 INJECTION INTRAMUSCULAR; INTRAVENOUS EVERY 30 MIN PRN
Status: DISCONTINUED | OUTPATIENT
Start: 2021-08-31 | End: 2021-08-31 | Stop reason: HOSPADM

## 2021-08-31 RX ORDER — OXYMETAZOLINE HYDROCHLORIDE 0.05 G/100ML
SPRAY NASAL PRN
Status: DISCONTINUED | OUTPATIENT
Start: 2021-08-31 | End: 2021-08-31 | Stop reason: HOSPADM

## 2021-08-31 RX ORDER — AMOXICILLIN 250 MG
1 CAPSULE ORAL 2 TIMES DAILY
Status: DISCONTINUED | OUTPATIENT
Start: 2021-08-31 | End: 2021-09-01 | Stop reason: HOSPADM

## 2021-08-31 RX ORDER — COCAINE HYDROCHLORIDE 40 MG/ML
SOLUTION NASAL PRN
Status: DISCONTINUED | OUTPATIENT
Start: 2021-08-31 | End: 2021-08-31 | Stop reason: HOSPADM

## 2021-08-31 RX ORDER — POLYETHYLENE GLYCOL 3350 17 G/17G
17 POWDER, FOR SOLUTION ORAL DAILY
Status: DISCONTINUED | OUTPATIENT
Start: 2021-09-01 | End: 2021-09-01 | Stop reason: HOSPADM

## 2021-08-31 RX ORDER — SODIUM CHLORIDE, SODIUM LACTATE, POTASSIUM CHLORIDE, CALCIUM CHLORIDE 600; 310; 30; 20 MG/100ML; MG/100ML; MG/100ML; MG/100ML
INJECTION, SOLUTION INTRAVENOUS CONTINUOUS
Status: DISCONTINUED | OUTPATIENT
Start: 2021-08-31 | End: 2021-08-31 | Stop reason: HOSPADM

## 2021-08-31 RX ORDER — NALOXONE HYDROCHLORIDE 0.4 MG/ML
0.4 INJECTION, SOLUTION INTRAMUSCULAR; INTRAVENOUS; SUBCUTANEOUS
Status: DISCONTINUED | OUTPATIENT
Start: 2021-08-31 | End: 2021-09-01 | Stop reason: HOSPADM

## 2021-08-31 RX ORDER — EPHEDRINE SULFATE 50 MG/ML
INJECTION, SOLUTION INTRAMUSCULAR; INTRAVENOUS; SUBCUTANEOUS PRN
Status: DISCONTINUED | OUTPATIENT
Start: 2021-08-31 | End: 2021-08-31

## 2021-08-31 RX ORDER — DEXAMETHASONE SODIUM PHOSPHATE 4 MG/ML
INJECTION, SOLUTION INTRA-ARTICULAR; INTRALESIONAL; INTRAMUSCULAR; INTRAVENOUS; SOFT TISSUE PRN
Status: DISCONTINUED | OUTPATIENT
Start: 2021-08-31 | End: 2021-08-31

## 2021-08-31 RX ORDER — ONDANSETRON 4 MG/1
4 TABLET, ORALLY DISINTEGRATING ORAL EVERY 6 HOURS PRN
Status: DISCONTINUED | OUTPATIENT
Start: 2021-08-31 | End: 2021-09-01 | Stop reason: HOSPADM

## 2021-08-31 RX ORDER — OXYCODONE HYDROCHLORIDE 5 MG/1
5 TABLET ORAL EVERY 4 HOURS PRN
Status: DISCONTINUED | OUTPATIENT
Start: 2021-08-31 | End: 2021-09-01 | Stop reason: HOSPADM

## 2021-08-31 RX ORDER — LABETALOL HYDROCHLORIDE 5 MG/ML
10 INJECTION, SOLUTION INTRAVENOUS EVERY 4 HOURS PRN
Status: DISCONTINUED | OUTPATIENT
Start: 2021-08-31 | End: 2021-09-01 | Stop reason: HOSPADM

## 2021-08-31 RX ORDER — ONDANSETRON 2 MG/ML
4 INJECTION INTRAMUSCULAR; INTRAVENOUS EVERY 6 HOURS PRN
Status: DISCONTINUED | OUTPATIENT
Start: 2021-08-31 | End: 2021-09-01 | Stop reason: HOSPADM

## 2021-08-31 RX ORDER — PROCHLORPERAZINE MALEATE 5 MG
10 TABLET ORAL EVERY 6 HOURS PRN
Status: DISCONTINUED | OUTPATIENT
Start: 2021-08-31 | End: 2021-09-01 | Stop reason: HOSPADM

## 2021-08-31 RX ORDER — PRAZOSIN HYDROCHLORIDE 5 MG/1
5 CAPSULE ORAL AT BEDTIME
Status: DISCONTINUED | OUTPATIENT
Start: 2021-08-31 | End: 2021-09-01 | Stop reason: HOSPADM

## 2021-08-31 RX ORDER — BISACODYL 10 MG
10 SUPPOSITORY, RECTAL RECTAL DAILY PRN
Status: DISCONTINUED | OUTPATIENT
Start: 2021-08-31 | End: 2021-09-01 | Stop reason: HOSPADM

## 2021-08-31 RX ORDER — ACETAMINOPHEN 325 MG/1
975 TABLET ORAL EVERY 8 HOURS
Status: DISCONTINUED | OUTPATIENT
Start: 2021-08-31 | End: 2021-09-01 | Stop reason: HOSPADM

## 2021-08-31 RX ORDER — NALOXONE HYDROCHLORIDE 0.4 MG/ML
0.2 INJECTION, SOLUTION INTRAMUSCULAR; INTRAVENOUS; SUBCUTANEOUS
Status: DISCONTINUED | OUTPATIENT
Start: 2021-08-31 | End: 2021-09-01 | Stop reason: HOSPADM

## 2021-08-31 RX ORDER — BUPIVACAINE HYDROCHLORIDE 5 MG/ML
INJECTION, SOLUTION EPIDURAL; INTRACAUDAL PRN
Status: DISCONTINUED | OUTPATIENT
Start: 2021-08-31 | End: 2021-08-31 | Stop reason: HOSPADM

## 2021-08-31 RX ORDER — LIDOCAINE 40 MG/G
CREAM TOPICAL
Status: DISCONTINUED | OUTPATIENT
Start: 2021-08-31 | End: 2021-09-01 | Stop reason: HOSPADM

## 2021-08-31 RX ORDER — OXYCODONE HYDROCHLORIDE 10 MG/1
10 TABLET ORAL EVERY 4 HOURS PRN
Status: DISCONTINUED | OUTPATIENT
Start: 2021-08-31 | End: 2021-09-01 | Stop reason: HOSPADM

## 2021-08-31 RX ORDER — CEFAZOLIN SODIUM 2 G/100ML
2 INJECTION, SOLUTION INTRAVENOUS SEE ADMIN INSTRUCTIONS
Status: DISCONTINUED | OUTPATIENT
Start: 2021-08-31 | End: 2021-08-31 | Stop reason: HOSPADM

## 2021-08-31 RX ORDER — LIDOCAINE HYDROCHLORIDE AND EPINEPHRINE 10; 10 MG/ML; UG/ML
INJECTION, SOLUTION INFILTRATION; PERINEURAL PRN
Status: DISCONTINUED | OUTPATIENT
Start: 2021-08-31 | End: 2021-08-31 | Stop reason: HOSPADM

## 2021-08-31 RX ORDER — ONDANSETRON 4 MG/1
4 TABLET, ORALLY DISINTEGRATING ORAL EVERY 30 MIN PRN
Status: DISCONTINUED | OUTPATIENT
Start: 2021-08-31 | End: 2021-08-31 | Stop reason: HOSPADM

## 2021-08-31 RX ORDER — ACETAMINOPHEN 325 MG/1
650 TABLET ORAL EVERY 4 HOURS PRN
Status: DISCONTINUED | OUTPATIENT
Start: 2021-09-03 | End: 2021-09-01 | Stop reason: HOSPADM

## 2021-08-31 RX ORDER — LIDOCAINE HYDROCHLORIDE 20 MG/ML
INJECTION, SOLUTION INFILTRATION; PERINEURAL PRN
Status: DISCONTINUED | OUTPATIENT
Start: 2021-08-31 | End: 2021-08-31

## 2021-08-31 RX ORDER — HYDRALAZINE HYDROCHLORIDE 20 MG/ML
2.5-5 INJECTION INTRAMUSCULAR; INTRAVENOUS EVERY 10 MIN PRN
Status: DISCONTINUED | OUTPATIENT
Start: 2021-08-31 | End: 2021-08-31 | Stop reason: HOSPADM

## 2021-08-31 RX ORDER — OXYMETAZOLINE HYDROCHLORIDE 0.05 G/100ML
2 SPRAY NASAL 2 TIMES DAILY
Status: DISCONTINUED | OUTPATIENT
Start: 2021-08-31 | End: 2021-09-01 | Stop reason: HOSPADM

## 2021-08-31 RX ORDER — FENTANYL CITRATE 50 UG/ML
INJECTION, SOLUTION INTRAMUSCULAR; INTRAVENOUS PRN
Status: DISCONTINUED | OUTPATIENT
Start: 2021-08-31 | End: 2021-08-31

## 2021-08-31 RX ORDER — MIRTAZAPINE 15 MG/1
15 TABLET, FILM COATED ORAL AT BEDTIME
Status: DISCONTINUED | OUTPATIENT
Start: 2021-08-31 | End: 2021-09-01 | Stop reason: HOSPADM

## 2021-08-31 RX ORDER — HYDROMORPHONE HCL IN WATER/PF 6 MG/30 ML
0.2 PATIENT CONTROLLED ANALGESIA SYRINGE INTRAVENOUS EVERY 5 MIN PRN
Status: DISCONTINUED | OUTPATIENT
Start: 2021-08-31 | End: 2021-08-31 | Stop reason: HOSPADM

## 2021-08-31 RX ORDER — LIDOCAINE 40 MG/G
CREAM TOPICAL
Status: DISCONTINUED | OUTPATIENT
Start: 2021-08-31 | End: 2021-08-31 | Stop reason: HOSPADM

## 2021-08-31 RX ADMIN — HYDROMORPHONE HYDROCHLORIDE 0.2 MG: 0.2 INJECTION, SOLUTION INTRAMUSCULAR; INTRAVENOUS; SUBCUTANEOUS at 17:56

## 2021-08-31 RX ADMIN — HYDRALAZINE HYDROCHLORIDE 5 MG: 20 INJECTION INTRAMUSCULAR; INTRAVENOUS at 18:33

## 2021-08-31 RX ADMIN — Medication 5 MG: at 12:53

## 2021-08-31 RX ADMIN — HYDROMORPHONE HYDROCHLORIDE 0.2 MG: 0.2 INJECTION, SOLUTION INTRAMUSCULAR; INTRAVENOUS; SUBCUTANEOUS at 18:06

## 2021-08-31 RX ADMIN — DOCUSATE SODIUM 50 MG AND SENNOSIDES 8.6 MG 1 TABLET: 8.6; 5 TABLET, FILM COATED ORAL at 20:48

## 2021-08-31 RX ADMIN — FENTANYL CITRATE 100 MCG: 50 INJECTION, SOLUTION INTRAMUSCULAR; INTRAVENOUS at 14:23

## 2021-08-31 RX ADMIN — ONDANSETRON 4 MG: 2 INJECTION INTRAMUSCULAR; INTRAVENOUS at 15:47

## 2021-08-31 RX ADMIN — SODIUM CHLORIDE, POTASSIUM CHLORIDE, SODIUM LACTATE AND CALCIUM CHLORIDE 500 ML: 600; 310; 30; 20 INJECTION, SOLUTION INTRAVENOUS at 22:27

## 2021-08-31 RX ADMIN — HYDROMORPHONE HYDROCHLORIDE 0.2 MG: 0.2 INJECTION, SOLUTION INTRAMUSCULAR; INTRAVENOUS; SUBCUTANEOUS at 18:35

## 2021-08-31 RX ADMIN — FENTANYL CITRATE 100 MCG: 50 INJECTION, SOLUTION INTRAMUSCULAR; INTRAVENOUS at 12:50

## 2021-08-31 RX ADMIN — ROCURONIUM BROMIDE 50 MG: 10 INJECTION INTRAVENOUS at 12:26

## 2021-08-31 RX ADMIN — HYDRALAZINE HYDROCHLORIDE 2.5 MG: 20 INJECTION INTRAMUSCULAR; INTRAVENOUS at 18:10

## 2021-08-31 RX ADMIN — HYDROMORPHONE HYDROCHLORIDE 0.2 MG: 0.2 INJECTION, SOLUTION INTRAMUSCULAR; INTRAVENOUS; SUBCUTANEOUS at 18:19

## 2021-08-31 RX ADMIN — OXYCODONE HYDROCHLORIDE 5 MG: 5 TABLET ORAL at 20:48

## 2021-08-31 RX ADMIN — PHENYLEPHRINE HYDROCHLORIDE 100 MCG: 10 INJECTION INTRAVENOUS at 13:00

## 2021-08-31 RX ADMIN — OXYMETAZOLINE HYDROCHLORIDE 2 SPRAY: 0.05 SPRAY NASAL at 21:23

## 2021-08-31 RX ADMIN — HYDROMORPHONE HYDROCHLORIDE 0.2 MG: 0.2 INJECTION, SOLUTION INTRAMUSCULAR; INTRAVENOUS; SUBCUTANEOUS at 17:34

## 2021-08-31 RX ADMIN — CEPHALEXIN 250 MG: 250 CAPSULE ORAL at 20:48

## 2021-08-31 RX ADMIN — HYDROMORPHONE HYDROCHLORIDE 0.2 MG: 1 INJECTION, SOLUTION INTRAMUSCULAR; INTRAVENOUS; SUBCUTANEOUS at 14:05

## 2021-08-31 RX ADMIN — HYDROMORPHONE HYDROCHLORIDE 0.2 MG: 0.2 INJECTION, SOLUTION INTRAMUSCULAR; INTRAVENOUS; SUBCUTANEOUS at 17:24

## 2021-08-31 RX ADMIN — MIDAZOLAM 2 MG: 1 INJECTION INTRAMUSCULAR; INTRAVENOUS at 12:20

## 2021-08-31 RX ADMIN — HYDROMORPHONE HYDROCHLORIDE 0.3 MG: 1 INJECTION, SOLUTION INTRAMUSCULAR; INTRAVENOUS; SUBCUTANEOUS at 15:50

## 2021-08-31 RX ADMIN — SALINE NASAL SPRAY 1 SPRAY: 1.5 SOLUTION NASAL at 21:34

## 2021-08-31 RX ADMIN — FENTANYL CITRATE 50 MCG: 50 INJECTION, SOLUTION INTRAMUSCULAR; INTRAVENOUS at 16:09

## 2021-08-31 RX ADMIN — DEXAMETHASONE SODIUM PHOSPHATE 6 MG: 4 INJECTION, SOLUTION INTRA-ARTICULAR; INTRALESIONAL; INTRAMUSCULAR; INTRAVENOUS; SOFT TISSUE at 13:42

## 2021-08-31 RX ADMIN — FENTANYL CITRATE 50 MCG: 50 INJECTION, SOLUTION INTRAMUSCULAR; INTRAVENOUS at 17:18

## 2021-08-31 RX ADMIN — FENTANYL CITRATE 100 MCG: 50 INJECTION, SOLUTION INTRAMUSCULAR; INTRAVENOUS at 13:38

## 2021-08-31 RX ADMIN — HYDROMORPHONE HYDROCHLORIDE 0.2 MG: 0.2 INJECTION, SOLUTION INTRAMUSCULAR; INTRAVENOUS; SUBCUTANEOUS at 18:01

## 2021-08-31 RX ADMIN — MIRTAZAPINE 15 MG: 15 TABLET, FILM COATED ORAL at 21:34

## 2021-08-31 RX ADMIN — SUGAMMADEX 200 MG: 100 INJECTION, SOLUTION INTRAVENOUS at 16:20

## 2021-08-31 RX ADMIN — LIDOCAINE HYDROCHLORIDE 60 MG: 20 INJECTION, SOLUTION INFILTRATION; PERINEURAL at 12:25

## 2021-08-31 RX ADMIN — ACETAMINOPHEN 975 MG: 325 TABLET, FILM COATED ORAL at 11:17

## 2021-08-31 RX ADMIN — PRAZOSIN HYDROCHLORIDE 5 MG: 5 CAPSULE ORAL at 21:33

## 2021-08-31 RX ADMIN — HYDROMORPHONE HYDROCHLORIDE 0.2 MG: 0.2 INJECTION, SOLUTION INTRAMUSCULAR; INTRAVENOUS; SUBCUTANEOUS at 18:11

## 2021-08-31 RX ADMIN — FENTANYL CITRATE 150 MCG: 50 INJECTION, SOLUTION INTRAMUSCULAR; INTRAVENOUS at 12:25

## 2021-08-31 RX ADMIN — SODIUM CHLORIDE, POTASSIUM CHLORIDE, SODIUM LACTATE AND CALCIUM CHLORIDE: 600; 310; 30; 20 INJECTION, SOLUTION INTRAVENOUS at 12:20

## 2021-08-31 RX ADMIN — CEFAZOLIN 2 G: 10 INJECTION, POWDER, FOR SOLUTION INTRAVENOUS at 12:32

## 2021-08-31 RX ADMIN — ACETAMINOPHEN 975 MG: 325 TABLET, FILM COATED ORAL at 20:48

## 2021-08-31 RX ADMIN — FENTANYL CITRATE 50 MCG: 50 INJECTION, SOLUTION INTRAMUSCULAR; INTRAVENOUS at 17:13

## 2021-08-31 ASSESSMENT — MIFFLIN-ST. JEOR
SCORE: 1851.25
SCORE: 1839.25

## 2021-08-31 NOTE — BRIEF OP NOTE
[facility]    Brief Operative Note    Pre-operative diagnosis: Nasal obstruction [J34.89]  Deviated nasal septum [J34.2]  Hypertrophy of inferior nasal turbinate [J34.3]  Post-operative diagnosis Same as pre-operative diagnosis    Procedure: Procedure(s):  Septorhinoplasty, Cosmetic Dorsal Hump Reduction  Surgeon: Surgeon(s) and Role:     * Yohana Coulter MD - Primary  Anesthesia: General   Estimated blood loss: 100 mL  Drains: None  Specimens: * No specimens in log *  Findings:   anteriorly deviated septum to the left with a bony spur, posteriorly deviated to the right.  Complications: None.  Implants: * No implants in log *      Elizabeth Arreola MD   Otolaryngology-Head & Neck Surgery PGY1  Please contact ENT with questions by dialing [phone] and entering job code [phone] when prompted.

## 2021-08-31 NOTE — ANESTHESIA PROCEDURE NOTES
Airway         Procedure Start/Stop Times: 8/31/2021 12:29 PM  Staff -        Anesthesiologist:  Rizwan Biggs MD       Resident/Fellow: Goldberg, Leslie, MD       Performed By: residentIndications and Patient Condition       Indications for airway management: leonardo-procedural       Induction type:intravenous       Mask difficulty assessment: 2 - vent by mask + OA or adjuvant +/- NMBA    Final Airway Details       Final airway type: endotracheal airway       Successful airway: ETT - single and BRANDIE  Endotracheal Airway Details        ETT size (mm): 7.0       Cuffed: yes       Successful intubation technique: direct laryngoscopy       DL Blade Type: MAC 4       Grade View of Cords: 1       Adjucts: stylet       Position: Center       Measured from: lips       Secured at (cm): 21       Bite block used: None    Post intubation assessment        Placement verified by: capnometry, equal breath sounds and chest rise        Number of attempts at approach: 1       Secured with: pink tape       Ease of procedure: easy       Dentition: Intact and Unchanged

## 2021-08-31 NOTE — ANESTHESIA POSTPROCEDURE EVALUATION
Patient: Juarez Jauregui    Procedure(s):  Septorhinoplasty, Cosmetic Dorsal Hump Reduction    Diagnosis:Nasal obstruction [J34.89]  Deviated nasal septum [J34.2]  Hypertrophy of inferior nasal turbinate [J34.3]  Diagnosis Additional Information: No value filed.    Anesthesia Type:  General    Note:  Disposition: Inpatient   Postop Pain Control: Uneventful            Sign Out: Well controlled pain   PONV: No   Neuro/Psych: Uneventful            Sign Out: Acceptable/Baseline neuro status   Airway/Respiratory: Uneventful            Sign Out: Acceptable/Baseline resp. status   CV/Hemodynamics: Uneventful            Sign Out: Acceptable CV status; No obvious hypovolemia; No obvious fluid overload   Other NRE:    DID A NON-ROUTINE EVENT OCCUR?            Last vitals:  Vitals Value Taken Time   /100 08/31/21 1645   Temp 36.8  C (98.2  F) 08/31/21 1645   Pulse 115 08/31/21 1654   Resp 16 08/31/21 1645   SpO2 96 % 08/31/21 1654   Vitals shown include unvalidated device data.    Electronically Signed By: Edouard Durham MD  August 31, 2021  4:55 PM

## 2021-08-31 NOTE — ANESTHESIA CARE TRANSFER NOTE
Patient: Juarez Jauregui    Procedure(s):  Septorhinoplasty, Cosmetic Dorsal Hump Reduction    Diagnosis: Nasal obstruction [J34.89]  Deviated nasal septum [J34.2]  Hypertrophy of inferior nasal turbinate [J34.3]  Diagnosis Additional Information: No value filed.    Anesthesia Type:   General     Note:    Oropharynx: oropharynx clear of all foreign objects and spontaneously breathing  Level of Consciousness: awake  Oxygen Supplementation: blow-by O2    Independent Airway: airway patency satisfactory and stable  Dentition: dentition unchanged  Vital Signs Stable: post-procedure vital signs reviewed and stable  Report to RN Given: handoff report given  Patient transferred to: PACU    Handoff Report: Identifed the Patient, Identified the Reponsible Provider, Reviewed the pertinent medical history, Discussed the surgical course, Reviewed Intra-OP anesthesia mangement and issues during anesthesia, Set expectations for post-procedure period and Allowed opportunity for questions and acknowledgement of understanding      Vitals:  Vitals Value Taken Time   /98 08/31/21 1634   Temp     Pulse 112 08/31/21 1642   Resp     SpO2 95 % 08/31/21 1642   Vitals shown include unvalidated device data.    Electronically Signed By: SUKHI Arnett CRNA  August 31, 2021  4:43 PM

## 2021-09-01 VITALS
DIASTOLIC BLOOD PRESSURE: 84 MMHG | BODY MASS INDEX: 28.88 KG/M2 | OXYGEN SATURATION: 97 % | HEART RATE: 102 BPM | TEMPERATURE: 97.1 F | HEIGHT: 70 IN | SYSTOLIC BLOOD PRESSURE: 138 MMHG | RESPIRATION RATE: 16 BRPM | WEIGHT: 201.72 LBS

## 2021-09-01 DIAGNOSIS — G89.18 ACUTE POST-OPERATIVE PAIN: Primary | ICD-10-CM

## 2021-09-01 LAB
ATRIAL RATE - MUSE: 93 BPM
DIASTOLIC BLOOD PRESSURE - MUSE: NORMAL MMHG
GLUCOSE BLDC GLUCOMTR-MCNC: 109 MG/DL (ref 70–99)
HGB BLD-MCNC: 14.8 G/DL (ref 13.3–17.7)
HOLD SPECIMEN: NORMAL
INTERPRETATION ECG - MUSE: NORMAL
P AXIS - MUSE: 56 DEGREES
PR INTERVAL - MUSE: 152 MS
QRS DURATION - MUSE: 94 MS
QT - MUSE: 362 MS
QTC - MUSE: 450 MS
R AXIS - MUSE: -15 DEGREES
SYSTOLIC BLOOD PRESSURE - MUSE: NORMAL MMHG
T AXIS - MUSE: 70 DEGREES
VENTRICULAR RATE- MUSE: 93 BPM

## 2021-09-01 PROCEDURE — 250N000013 HC RX MED GY IP 250 OP 250 PS 637: Performed by: OTOLARYNGOLOGY

## 2021-09-01 PROCEDURE — 85018 HEMOGLOBIN: CPT | Performed by: OTOLARYNGOLOGY

## 2021-09-01 PROCEDURE — 250N000009 HC RX 250: Performed by: OTOLARYNGOLOGY

## 2021-09-01 PROCEDURE — G0378 HOSPITAL OBSERVATION PER HR: HCPCS

## 2021-09-01 PROCEDURE — 36415 COLL VENOUS BLD VENIPUNCTURE: CPT | Performed by: OTOLARYNGOLOGY

## 2021-09-01 PROCEDURE — 250N000009 HC RX 250: Performed by: STUDENT IN AN ORGANIZED HEALTH CARE EDUCATION/TRAINING PROGRAM

## 2021-09-01 PROCEDURE — 250N000011 HC RX IP 250 OP 636: Performed by: OTOLARYNGOLOGY

## 2021-09-01 RX ORDER — AMOXICILLIN 250 MG
1 CAPSULE ORAL 2 TIMES DAILY
Qty: 14 TABLET | Refills: 0 | Status: SHIPPED | OUTPATIENT
Start: 2021-09-01 | End: 2021-09-08

## 2021-09-01 RX ORDER — PRAZOSIN HYDROCHLORIDE 1 MG/1
1 CAPSULE ORAL
COMMUNITY
End: 2023-04-08

## 2021-09-01 RX ORDER — EMTRICITABINE AND TENOFOVIR DISOPROXIL FUMARATE 200; 300 MG/1; MG/1
1 TABLET, FILM COATED ORAL AT BEDTIME
Status: DISCONTINUED | OUTPATIENT
Start: 2021-09-01 | End: 2021-09-01 | Stop reason: HOSPADM

## 2021-09-01 RX ORDER — MUPIROCIN 20 MG/G
OINTMENT TOPICAL 3 TIMES DAILY
Status: DISCONTINUED | OUTPATIENT
Start: 2021-09-01 | End: 2021-09-01 | Stop reason: HOSPADM

## 2021-09-01 RX ORDER — MUPIROCIN 20 MG/G
OINTMENT TOPICAL 3 TIMES DAILY
Qty: 15 G | Refills: 0 | Status: SHIPPED | OUTPATIENT
Start: 2021-09-01 | End: 2021-09-08

## 2021-09-01 RX ORDER — OXYCODONE HYDROCHLORIDE 5 MG/1
5 TABLET ORAL EVERY 6 HOURS PRN
Qty: 30 TABLET | Refills: 0 | Status: SHIPPED | OUTPATIENT
Start: 2021-09-01 | End: 2021-10-30

## 2021-09-01 RX ORDER — DULOXETIN HYDROCHLORIDE 60 MG/1
120 CAPSULE, DELAYED RELEASE ORAL AT BEDTIME
Status: DISCONTINUED | OUTPATIENT
Start: 2021-09-01 | End: 2021-09-01 | Stop reason: HOSPADM

## 2021-09-01 RX ADMIN — CEPHALEXIN 250 MG: 250 CAPSULE ORAL at 11:56

## 2021-09-01 RX ADMIN — ACETAMINOPHEN 975 MG: 325 TABLET, FILM COATED ORAL at 11:56

## 2021-09-01 RX ADMIN — OXYCODONE HYDROCHLORIDE 10 MG: 10 TABLET ORAL at 01:03

## 2021-09-01 RX ADMIN — ONDANSETRON 4 MG: 4 TABLET, ORALLY DISINTEGRATING ORAL at 06:12

## 2021-09-01 RX ADMIN — MUPIROCIN: 20 OINTMENT TOPICAL at 13:20

## 2021-09-01 RX ADMIN — SALINE NASAL SPRAY 1 SPRAY: 1.5 SOLUTION NASAL at 08:43

## 2021-09-01 RX ADMIN — CEPHALEXIN 250 MG: 250 CAPSULE ORAL at 06:02

## 2021-09-01 RX ADMIN — MUPIROCIN: 20 OINTMENT TOPICAL at 08:41

## 2021-09-01 RX ADMIN — OXYCODONE HYDROCHLORIDE 10 MG: 10 TABLET ORAL at 11:56

## 2021-09-01 RX ADMIN — SALINE NASAL SPRAY 1 SPRAY: 1.5 SOLUTION NASAL at 13:20

## 2021-09-01 RX ADMIN — CEPHALEXIN 250 MG: 250 CAPSULE ORAL at 00:53

## 2021-09-01 RX ADMIN — OXYCODONE HYDROCHLORIDE 10 MG: 10 TABLET ORAL at 06:02

## 2021-09-01 RX ADMIN — OXYMETAZOLINE HYDROCHLORIDE 2 SPRAY: 0.05 SPRAY NASAL at 08:10

## 2021-09-01 NOTE — PROGRESS NOTES
Brief Otolaryngology Note    Patient has a history of WILBER (not on CPAP PTA) but is s/p nasal surgery and cannot use a CPAP. Supplemental oxygen to keep sats > 92, but please work toward weaning O2. Keep heads of bed elevated and rouse patient if desaturating while sleeping. Please do not apply pressure to the nose when using supplemental O2.     Elizabeth Arreola MD

## 2021-09-01 NOTE — PHARMACY-ADMISSION MEDICATION HISTORY
Pharmacy Admission Medication History    Admission medication history interview status for the 8/31/2021 admission is complete. See EPIC admission navigator for allergy information, prior to admission medications and immunization status.     Medication history interview source(s): Patient    Medication history resources (including written lists, pill bottles, clinic record): None    Medication history source reliability: Good    Actions taken by pharmacist (provider contacted, medication changes, etc):None     Changes made to medication history:    Added to medication list:  Prazosin 1 mg at bedtime PRN    Additional medication history information: Patient takes all prescriptions at night    Medication reconciliation/reorder completed by provider prior to medication history? Yes    Time spent in this activity: 20 minutes    Prior to Admission medications    Medication Sig Last Dose Taking? Auth Provider   DULoxetine (CYMBALTA) 60 MG capsule Take 120mg daily--follows with psychiatry 8/30/2021 at Unknown time Yes Danielle Mitchell MD   medical cannabis (Patient's own supply) (The purpose of this order is to document that the patient reports taking medical cannabis.  This is not a prescription, and is not used to certify that the patient has a qualifying medical condition.),   Capsules and vaping once daily 8/30/2021 at Unknown time Yes Danielle Mitchell MD   mirtazapine (REMERON) 15 MG tablet Take 1 at bedtime, follows with psychiatrist 8/30/2021 at Unknown time Yes Danielle Mitchell MD   multivitamin w/minerals (THERA-VIT-M) tablet Take 1 tablet by mouth daily Past Month at Unknown time Yes Reported, Patient   Omega-3 Fatty Acids (FISH OIL) 1200 MG capsule Take 1,200 mg by mouth At Bedtime  Past Month at Unknown time Yes Reported, Patient   ORDER FOR ALLERGEN IMMUNOTHERAPY Name of Mix: Mix #1  Cat, Grass, Ragweed, Tree   Cat Hair, Standardized 10,000 BAU/mL, ALK  0.5 ml   Birch Mix PRW 1:20 w/v, HS   0.3 ml  Cedar, Red 1:20 w/v, HS  0.3 ml  Toño Grass (Std) 100,000 BAU/mL, HS 0.3 ml  Ragweed Mixed 1:20 w/v ALK  0.3 ml  Diluent: HSA qs to 5ml 8/30/2021 at Unknown time Yes Jennifer Fiore MD   ORDER FOR ALLERGEN IMMUNOTHERAPY Name of Mix: Mix #2  Mold  Alternaria Tenuis 1:10 w/v, HS  0.2 ml  Diluent: HSA qs to 5ml 8/30/2021 at Unknown time Yes Jennifer Fiore MD   prazosin (MINIPRESS) 1 MG capsule Take 1 mg by mouth nightly as needed Past Week at Unknown time Yes Unknown, Entered By History   prazosin (MINIPRESS) 5 MG capsule Take 1 at bedtime, may take additonal 1mg dose at bedtime prn. follows with psychiatry 8/30/2021 at Unknown time Yes Danielle Mitchell MD   study - emtricitabine-tenofovir 200-300, IDS#[id number], (TRUVADA) per tablet Take 1 tablet by mouth daily 8/30/2021 at Unknown time Yes Danielle Mitchell MD   Vitamin D, Cholecalciferol, 25 MCG (1000 UT) CAPS Take 1 capsule by mouth daily Past Month at Unknown time Yes Danielle Mitchell MD   LORazepam (ATIVAN) 1 MG tablet Take 1 tablet (1 mg) by mouth every 8 hours as needed for anxiety More than a month at Unknown time  Danielle Mitchell MD   sildenafil (VIAGRA) 100 MG tablet Take 1 tablet (100 mg) by mouth daily as needed (ED) More than a month at Unknown time  Danielle Mitchell MD

## 2021-09-01 NOTE — PROGRESS NOTES
"Cares 1930 to 2300    BP (!) 135/96 (BP Location: Left arm)   Pulse 95   Temp (!) 95.5  F (35.3  C) (Oral)   Resp 17   Ht 1.778 m (5' 10\")   Wt 91.5 kg (201 lb 11.5 oz)   SpO2 93%   BMI 28.94 kg/m      Pain: At operated site in nose, oxycodone & tylenol given 1x.   Neuros: A&o x4.   Cardiac: HTN. Tachycardic intermittently. Denied chest pain. C/o dizziness & feeling lightheaded, EKG done, LR bolus infusing now.   Resp: Denied SOB. C/o nasal congestion, saline spray given. Small amounts of bleeding from nose, provider aware. Afrin spray provided. Breathing well on room air.   GI/: WDL  Nutrition: Pt tolerating liquids well.   IV/Drains: L hand PIV infusing LR bolus.   Skin: JUWAN.   Activity: SBA w/cane.     Events this shift: Pt's med box sent down to security. Pt sleeping now.     Observation goals:   1. Maintain sats without supplemental O2 : met  2. Achieve adequate pain control with PO medication : pending  3. Tolerate a PO diet : pending, Pt ate chicken noodle soup, ice cream, lemon lime soda, & popsicle. Tolerated it well.   "

## 2021-09-01 NOTE — PLAN OF CARE
Reason for admission: Nasal obstruction post op  Admitted from: PACU  Report received from: Jasson, PACU RN  2 RN skin assessment completed by: JUWAN, oncoming RN notified.   Bed Algorithm reevaluated: Yes  Was Pulsate ordered?: No  Care plan (primary problem) and Education initiated: Yes  Detailed Belongings: In Summary of Care

## 2021-09-01 NOTE — SUMMARY OF CARE
Pt has the following belongings  Phone  Walking stick  Red bag  Shoes  Pant  nintendo   Shirt  Pant underwear  Sox  Medications

## 2021-09-01 NOTE — PLAN OF CARE
"Care assumed: 2300- 0730   Vitals  Pain  BP (!) 140/107 (BP Location: Left arm)   Pulse 91   Temp 97.2  F (36.2  C) (Oral)   Resp 16   Ht 1.778 m (5' 10\")   Wt 91.5 kg (201 lb 11.5 oz)   SpO2 97% RA   BMI 28.94 kg/m            Pain in nose from surgery. Oxycodone 10mg given    Activity  SBA with cane    Neuro/Mood  A&Ox 4     Denies numbness and tingling    Cardiac  No signs of acute distress    Respiratory  No signs of acute respiratory distress    GI/  Voids spontaneously      Diet/ Nutrition  Tolerating Reg diet      Skin, Wounds, LDAs Skin mostly intact ex; bruising on right elbow from allergy shots   Dressing over nose and splint in nose from septal rhinoplasty.   R PIV SL      Plan of Care  Other notes Follow POC    Ocean nasal spray given        "

## 2021-09-01 NOTE — PROGRESS NOTES
1. Maintain sats without supplemental O2   MET  2. Achieve adequate pain control with PO medication   Pending... taking 10mg of oxy   3. Tolerate a PO diet Met

## 2021-09-01 NOTE — DISCHARGE SUMMARY
Discharge Summary  Juarez Jauregui  [id number]  1985    Date of Admission: 8/31/2021  Date of Discharge: 9/1/2021    Admission Diagnosis: WILBER (obstructive sleep apnea) [G47.33]  Nasal obstruction  Discharge Diagnosis: Same    Date: 8/30/2021  Procedure(s):  Septorhinoplasty, Cosmetic Dorsal Hump Reduction    HPI: Juarez Jauregui is a 36 year old male with history of significant nasal obstruction that has led to dysfunction and symptoms. Physical examination in clinic demonstrated notable structural deformities requiring a surgical correction for improvement of function. These would not be amenable to medical therapy or by septoplasty alone. It was recommended that he undergo operative intervention and the patient consented to the above procedure after detailed explanation of the risks and benefits of said procedure.    Hospital Course: The patient was admitted to the hospital and underwent the above mentioned procedure. He tolerated the procedure without any intra- or leonardo-operative complications. Please see the operative report for full details of the procedure. The patient was admitted for post-operative monitoring due to his history of obstructive sleep apnea. His postoperative course was uneventful and he did not require supplemental oxygen to maintain saturations while sleeping. At discharge, the patient's pain was well controlled, he was voiding independently, maintaining saturations without supplemental oxygen,and tolerating a regular diet.     Follow up appointments:  9/8/2021 ENT RN     Discharge Exam:  Vitals:    08/31/21 2046 08/31/21 2249 09/01/21 0058 09/01/21 0603   BP: (!) 146/100 (!) 135/96 (!) 145/105 (!) 140/107   BP Location: Left arm Left arm Left arm Left arm   Pulse: 91 95  91   Resp: 14 17  16   Temp:  (!) 95.5  F (35.3  C)  97.2  F (36.2  C)   TempSrc:  Oral  Oral   SpO2: 93% 93%  97%   Weight:       Height:           General: A&O x 3, engaged in discussion   HEENT: EOMI, nasal dressing  in place with minimal serosanguinous drainage.   Respiratory: Breathing non-labored on room air, no stridor, no accessory muscle use.     Discharge Medications:   Norberto Jaureguiraj TAYLOR   Home Medication Instructions TEE:[id number]    Printed on:09/01/21 1032   Medication Information                      DULoxetine (CYMBALTA) 60 MG capsule  Take 120mg daily--follows with psychiatry             LORazepam (ATIVAN) 1 MG tablet  Take 1 tablet (1 mg) by mouth every 8 hours as needed for anxiety             medical cannabis (Patient's own supply)  (The purpose of this order is to document that the patient reports taking medical cannabis.  This is not a prescription, and is not used to certify that the patient has a qualifying medical condition.),   Capsules and vaping once daily             mirtazapine (REMERON) 15 MG tablet  Take 1 at bedtime, follows with psychiatrist             multivitamin w/minerals (THERA-VIT-M) tablet  Take 1 tablet by mouth daily             Omega-3 Fatty Acids (FISH OIL) 1200 MG capsule  Take 1,200 mg by mouth At Bedtime              ORDER FOR ALLERGEN IMMUNOTHERAPY  Name of Mix: Mix #1  Cat, Grass, Ragweed, Tree   Cat Hair, Standardized 10,000 BAU/mL, ALK  0.5 ml   Birch Mix PRW 1:20 w/v, HS  0.3 ml  Cedar, Red 1:20 w/v, HS  0.3 ml  Toño Grass (Std) 100,000 BAU/mL, HS 0.3 ml  Ragweed Mixed 1:20 w/v ALK  0.3 ml  Diluent: HSA qs to 5ml             ORDER FOR ALLERGEN IMMUNOTHERAPY  Name of Mix: Mix #2  Mold  Alternaria Tenuis 1:10 w/v, HS  0.2 ml  Diluent: HSA qs to 5ml             prazosin (MINIPRESS) 1 MG capsule  Take 1 mg by mouth nightly as needed             prazosin (MINIPRESS) 5 MG capsule  Take 1 at bedtime, may take additonal 1mg dose at bedtime prn. follows with psychiatry             sildenafil (VIAGRA) 100 MG tablet  Take 1 tablet (100 mg) by mouth daily as needed (ED)             study - emtricitabine-tenofovir 200-300, IDS#[id number], (TRUVADA) per tablet  Take 1 tablet by mouth daily              Vitamin D, Cholecalciferol, 25 MCG (1000 UT) CAPS  Take 1 capsule by mouth daily                 Discharge Procedure Orders   Reason for your hospital stay   Order Comments: You were in the hospital to ensure that you could maintain your blood oxygen levels without supplemental oxygen     Activity   Order Comments: Your activity upon discharge: activity as tolerated, but avoid strenuous activity (anything that makes you sweat) for 2 weeks     Order Specific Question Answer Comments   Is discharge order? Yes      Wound care and dressings   Order Comments: I-- Keep nasal cast in place until follow-up. If this falls off, okay to leave off. Keep your nose dry.  -- Avoid blowing your nose, bending over, and straining. Cough and sneeze with mouth open.  -- If your nose starts to bleed, spray Afrin spray and hold pressure on the soft part of your nose with your fingers for ten minutes. Repeat this a second time as needed. If bleeding persists, call the ENT clinic number that was provided or present to urgent care/emergency department.     When to contact your care team   Order Comments: -- If you develop chest pain or difficulty breathing  -- If you develop fever > 101F in the 7 days after surgery  -- If you have nose bleed that is not controlled with Afrin and digital pressure  -- If you have any questions or concerns     Follow Up ([facility])   Order Comments: Follow-up in ENT clinic on 9/8/2021 as scheduled.     Diet   Order Comments: Resume your pre-procedure diet     Order Specific Question Answer Comments   Is discharge order? Yes        Dispo: To home in good condition. All of the patient's questions/concerns have been addressed at this time. They are comfortable and independent in all cares.     - Has follow-up on 9/8 in ENT clinic     IYohana MD, saw and evaluated this patient prior to discharge. I discussed the patient with the resident and agree with plan of care as documented in the resident  note. I personally spent 5 minutes on discharge activities.

## 2021-09-01 NOTE — OP NOTE
SURGEON:  Yohana Coulter MD   ASSISTANT SURGEON: Elizabeth Arreola MD      TITLE OF OPERATION:                       Septorhinoplasty    Bilateral inferior turbinate reduction and outfracture.    Nasal valve repair, bilateral.       INDICATIONS:      Juarez Jauregui is a 36 year old patient with a significant history of nasal obstruction that has led to significant dysfunction and symptoms. Physical examination in clinic demonstrated notable structural deformities requiring a surgical correction for improvement of function. These would not be amenable to medical therapy or by septoplasty. The structural deficits involve the septum in its direct relationship to the bony and cartilaginous nasal framework.     The risks and benefits of the procedure were discussed in clinic but also in the preoperative area. The risks discussed included bleeding with need for intervention, scarring, infection, shifting of the nasal framework, incomplete correction of nasal obstruction, contour deformities, vasomotor rhinitis and changes to the external appearance of the nose. The possibility of future need for additional procedures was also discussed. We also discussed the post operative care and expected course.     All questions were answered and the patient signed consent for the above mentioned procedures.     PREOPERATIVE DIAGNOSES:   Septal deviation.   Nasal obstruction.   Nasal valve stenosis.     Bilateral inferior turbinate hypertrophy      POSTOPERATIVE DIAGNOSES:     Septal deviation.   Nasal obstruction.   Nasal valve stenosis.     Bilateral inferior turbinate hypertrophy       ANESTHESIA:    General endotracheal anesthesia  Local 1% lidocaine with 1:100,000 epinephrine 10 cc's   Topical Afrin intranasally on pledgets  Topical Cocaine intranasally on pledgets  0.25% Bupivacaine, 2 cc's      IMPLANT DEVICES:   Bilateral Villa splints, Aquaplast nasal splint over the nasal dorsum.       SPECIMENS:  None.        COMPLICATIONS:  None.       BLOOD LOSS:  100 mL        SURGEON'S NARRATIVE:       FINDINGS: The patient had a significant  anteriorly deviated septum to the left with a bony spur, posteriorly deviated to the right    Grafts:   Bilateral  grafts, septal cartilage   Caudal septal extension graft, septal cartilage  Dorsal onlay graft, septal cartilage   Tip onlay graft, septal cartilage     Osteotomies:  Bilateral medial fading  Bilateral lateral      DESCRIPTION OF PROCEDURE:      The patient was brought back to the operating room by the Anesthesiology staff. They were laid supine on the operating room table and induced into general anesthesia with the endotracheal tube secured at the midline of the chin.  The head of the bed was then turned 90 degrees towards the surgical team.  Arms were tucked at the side with all pressure points appropriately padded.  A time-out procedure was performed with all members of the operating room staff in agreement of the site of surgery, the patient identification and surgery to be performed. The nasal block was then performed with 1% lidocaine with 1:100,000 epinephrine and four-percent cocaine nasal pledgets were placed topically into bilateral nasal passages. The face was prepped and draped in usual sterile fashion with ophthalmic diluted Betadine.  The eyes were taped with Tegaderm.  Subsequently, surgery began.       A columellar incision was made with an #11 blade scalpel, and subsequently marginal incisions were made with a #15 blade.  The soft tissue envelope in a sub-SMAS plane was elevated off of the lower lateral cartilages.  This exposed the dome and bilateral lower lateral cartilages.  Subsequently, dissection was followed cranially.  This was done in the sub-SMAS plane leading to exposure of the scroll region and bilateral upper lateral cartilages.  Once we reached the edge of the nasal bones, a Eric elevator was used to transition the elevation to a  subperiosteal plane.  Subsequently, we divided the domes bilaterally to expose the caudal edge of the septum and the anterior septal angle.       Bilateral mucoperichondrial flaps were elevated using a #15 blade scalpel.  This was significantly tedious and time-consuming, as it had to be meticulously done because he had a lot of scar in this soft tissue plane. Once we elevated the bilateral flaps, we performed the septoplasty after the upper lateral cartilages were divided off of the dorsal septum. The cartilaginous and bony dorsum were taken down using a 15-blade and rasp, respectively. Medial fading osteotomies were then performed using a curved guarded osteotome.      A 1.3 mm L-strut dorsally and caudally was preserved of the septum, and subsequently the septum was harvested. Once this was harvested, the mucosa was then reapproximated with a mattress quilting stitch with a 4-0 chromic in a running fashion.     Two  grafts were fashioned from the harvested septal cartilage. These were placed in between the dorsal septum and the upper lateral cartilage.  They were sutured in place with mattress 5-0 PDS sutures.  The upper lateral cartilages were then resuspended onto the complex. The caudal septum was then approached and set medially. A caudal septal extension graft was placed to support the tip structure.    Lateral fading osteotomies were then performed in a hi-low-hi fashion, allowing for mobilization of the nasal bones.       Bilateral inferior turbinates were then reduced submucosally with the bipolar turbinate cautery and outfractured     The soft tissue envelopewas then redraped over the framework.  This demonstrated that the nose was nice and straight and found that the tip had appropriate support. The medial crura were then reapproximated with through-and-through 4-0 plain gut sutures on a Angel needle.  The above mentioned grafts were placed. The dome was set in this position, also.    The  columellar incision was closed with interrupted 6-0 Monocryl sutures.  Bilateral marginal incisions were then closed with interrupted 4-0 chromic sutures.  The nose was suctioned, the nasopharynx was suctioned, the oropharynx was suctioned, and the stomach was suctioned and emptied of its contents.       We then placed bilateral Villa splints with Bactroban ointment.  Mastisol and Steri-Strip were placed over the nasal dorsum.  The Aquaplast nasal splint was then placed above that. 0.25% Bupivacaine was the injected, 2 cc's for a lasting nasal block. This completed the procedure.     The patient tolerated the procedure well.  There were no complications. The patient was extubated and taken to recovery following commands and breathing spontaneously.       Dr. Davis was present and scrubbed for the entire procedure.       Elizabeth Arreola MD   Otolaryngology-Head & Neck Surgery PGY1  Please contact ENT with questions by dialing [phone] and entering job code [id number] when prompted.      I was present, scrubbed for the entire procedure and performed key aspects. I agree with the note.     MALISSA DAVIS MD

## 2021-09-01 NOTE — PLAN OF CARE
"Blood pressure 138/84, pulse 102, temperature 97.1  F (36.2  C), resp. rate 16, height 1.778 m (5' 10\"), weight 91.5 kg (201 lb 11.5 oz), SpO2 97 %.RA.      Patient A & O X 4 , VSS no respiratory distress noted . Nose Pk in place. Pain manageable with Oxycodone PRN. Appetite good . Void adequate no bowel movement on this shift. Patient stable and has discharge order to go home . Patient aware and agreeable with plan. Writer reviewed discharge instructions with patient and instruct patient to follow up with appointment as scheduled. Patient verbalized understanding . Also instruct Patient to complete oral abx. Writer given some supply for nose. Asked patient is he has Concerns or questions , Patient report no questions or concerns. Patient left the unit @ ~ 1435, escorted by his Mother and patient took all his belonging with and security brought other belonging too. .      "

## 2021-09-01 NOTE — PROGRESS NOTES
Admitted/transferred from:   2 RN full   skin assessment completed by Mata Rashid RN and Anabelle Rico RN .  Skin assessment finding: issues found Brusing on right elbow from allergy shots. Scabs on right shin.    Interventions/actions: other Mepilex on sacrum for prophylaxis      Will continue to monitor.

## 2021-09-01 NOTE — PLAN OF CARE
Patient A & O X 4  no respiratory distress noted  sat RA . Patient ambulated in hallway with staff and used his cane . Patient report little lightheaded . Otherwise he say okay . Tolerating po well . Void adequate . Continue with POC and update MD with change.

## 2021-09-01 NOTE — PROVIDER NOTIFICATION
Provider notified (name): TREVOR WHALEY [ Msg Id [id number] ]  Reason for notification: No orders for face tent. Pt on 35% humidification. Asking if he can remove it tonight?    Response from provider: Face tent only for comfort. OK to remove.

## 2021-09-01 NOTE — PROVIDER NOTIFICATION
Provider notified (name): RESIDENT Jorge CHIEF OTOLARYNGOLOGY  Reason for notification: No orders for face tent. Pt on 35% humidification. Asking if he can remove it tonight?    Response from provider: Call resident.

## 2021-09-01 NOTE — PROVIDER NOTIFICATION
Provider notified (name): ARTIE Rodriguez  Reason for notification:  Blood pressure 144/100. C/o dizziness & feeling lightheaded.     Response from provider: Will order EKG & Hgb check as pt lost a lot of blood during surgery. Will place order for fluids. Placing VS parameters & OBS goals.

## 2021-09-08 ENCOUNTER — ALLIED HEALTH/NURSE VISIT (OUTPATIENT)
Dept: OTOLARYNGOLOGY | Facility: CLINIC | Age: 36
End: 2021-09-08
Payer: COMMERCIAL

## 2021-09-08 DIAGNOSIS — Z98.890 POSTOPERATIVE STATE: Primary | ICD-10-CM

## 2021-09-08 PROCEDURE — 99024 POSTOP FOLLOW-UP VISIT: CPT

## 2021-09-10 ENCOUNTER — DOCUMENTATION ONLY (OUTPATIENT)
Dept: OTHER | Facility: CLINIC | Age: 36
End: 2021-09-10

## 2021-09-15 NOTE — PROGRESS NOTES
Pt comes in POD #8 s/p Septorhinoplasty, Bilateral inferior turbinate reduction and outfracture, Nasal valve repair, bilateral, Cosmetic Dorsal Hump Reduction.    Nasal cast and nasal splints removed.    Pt's nose is appropriately swollen and tender.     Pt is pleased with his profile and pleased with the improvement in breathing post splint removal.    Reminded pt that his nose is still very swollen and that it will continue to slowly improve with time.    Encouraged pt to continue with nasal saline and f/u in one month.    Katelin Hatfield RN  9/15/2021 11:02 AM

## 2021-09-16 ENCOUNTER — OFFICE VISIT (OUTPATIENT)
Dept: NEUROLOGY | Facility: CLINIC | Age: 36
End: 2021-09-16
Attending: PSYCHIATRY & NEUROLOGY
Payer: COMMERCIAL

## 2021-09-16 DIAGNOSIS — R20.0 NUMBNESS AND TINGLING IN LEFT HAND: ICD-10-CM

## 2021-09-16 DIAGNOSIS — R20.2 NUMBNESS AND TINGLING IN LEFT HAND: ICD-10-CM

## 2021-09-16 PROCEDURE — 95886 MUSC TEST DONE W/N TEST COMP: CPT | Performed by: PSYCHIATRY & NEUROLOGY

## 2021-09-16 PROCEDURE — 95912 NRV CNDJ TEST 11-12 STUDIES: CPT | Performed by: PSYCHIATRY & NEUROLOGY

## 2021-09-16 NOTE — PROGRESS NOTES
[facility]             Electrodiagnostic Laboratory                                                                                                           Electromyography Report  Test Date:  2021    Patient: Juarez Jauregui : 1985 Physician: Eric Napier MD   Sex: Male AGE: 36 year Ref Phys:    ID#: [id number]   Technician: Kristy Behling     Clinical Information:      Techniques:  Motor and sensory conduction studies were done with surface recording electrodes. EMG was done with a single use concentric needle electrode.     Results:  The left median motor nerve conduction study was normal.  Left ulnar motor nerve conduction study to both the abductor digit he minimi and first dorsal interosseous was normal.  There was no evidence of slowing of motor conduction around the elbow.  Left median and ulnar antidromic sensory nerve action potentials were normal.  The left superficial radial sensory nerve action potential was normal.  Bilateral median and lateral antebrachial cutaneous nerve of the forearm sensory nerve action potentials were normal.  Left median and ulnar sensory distal latencies were compared using the palmar sensory technique.  There was no disproportionate slowing of the left median sensory distal latency.  Left median and ulnar distal motor conduction was compared by stimulating the median nerve and recording from the lumbricals and stimulating the ulnar nerve and recording from the second palmar interosseous at equal distances.  There was no disproportionate slowing of left distal median motor conduction.  Needle exam of the left arm was normal.      Interpretation: The EMG is normal.  There is no EMG evidence for a left ulnar neuropathy, brachial plexopathy or cervical radiculopathy.        ___________________________  Eric Napier MD        Nerve Conduction Studies  Motor Sites      Latency Amplitude Neg. Amp Diff Segment Distance Velocity Neg.  Dur Neg Area Diff Temperature Comment   Site (ms) Norm (mV) Norm %  cm m/s Norm ms %  C    Left Median (APB) Motor   Wrist 3.6  < 4.2 6.3  > 5.0  Wrist-APB 8   4.7  32.5    Elbow 7.8 - 6.0 - -4.8 Elbow-Wrist 23 55  > 48 4.9 -4.2 32.5    Left Median/Ulnar (Lumb-Dors Int II) Motor        Median (Lumb I)   Wrist 3.1 - 0.91 -      9.0  31.9         Ulnar (Dorsal Int (manus))   Wrist 3.0 - 4.3 -      6.0  32.1    Left Ulnar (ADM) Motor   Wrist 2.4  < 3.5 8.9  > 3.0  Wrist-ADM 8   5.8  31.3    Bel Elbow 6.1 - 8.8 - -1.12 Bel Elbow-Wrist 23 62  > 48 5.6 -5.6 31.5    Abv Elbow 7.8 - 8.5 - -3.4 Abv Elbow-Bel Elbow 10 59  > 48 5.7 -3.0 31.6    Left Ulnar (FDI) Motor   Wrist 3.7 - 13.5 -      4.4  32    Bel Elbow 6.9 - 12.5 - -7.4 Bel Elbow-Wrist 21 66  > 48 4.8 1.45 31.9    Abv Elbow 8.4 - 12.6 - 0.80 Abv Elbow-Bel Elbow 10 67  > 48 5.3 5.7 31.9      Sensory Sites      Onset Lat Neg Peak Lat Amp (O-P) Amp (P-P) Segment Distance Velocity Temperature Comment   Site ms ms  V Norm  V  cm m/s Norm  C    Left Lateral Antebrachial Cutaneous Sensory   Lat Biceps-Lat Forearm 1.48 2.0 45 - 41 Lat Biceps-Lat Forearm 10 68 - 32.5    Right Lateral Antebrachial Cutaneous Sensory   Lat Biceps-Lat Forearm 1.35 2.0 24 - 28 Lat Biceps-Lat Forearm 10 74 - 32.7    Left Medial Antebrachial Cutaneous Sensory   Elbow-Med Forearm 1.75 2.4 14 - 13 Elbow-Med Forearm 12 69 - 32.5    Right Medial Antebrachial Cutaneous Sensory   Elbow-Med Forearm 1.73 2.2 8 - 11 Elbow-Med Forearm 12 69 - 32    Left Median Sensory   Wrist-Dig II 2.4 3.0 33  > 10 50 Wrist-Dig II 14 58  > 48 28.9    Left Median-Ulnar Palmar Sensory        Median   Palm-Wrist 1.50 1.90 46 - 68 Palm-Wrist 8 53 - 32.4         Ulnar   Palm-Wrist 1.35 1.83 15 - 19 Palm-Wrist 8 59 - 32.4    Left Radial Sensory   Forearm-Wrist 1.90 2.4 23  > 15 27 Forearm-Wrist 10 53 - 32.5    Left Ulnar Sensory   Wrist-Dig V 2.3 2.9 30  > 8 18 Wrist-Dig V 12.5 54  > 48 30.8      Inter-Nerve Comparisons     Nerve  1 Value 1 Nerve 2 Value 2 Parameter Result Normal   Sensory Sites   L Median Palm-Wrist 1.9 ms L Ulnar Palm-Wrist 1.8 ms Peak Lat Diff 0.07 ms <0.30       Electromyography     Side Muscle Nerve Root Ins Act Fibs/PSW Fasc HF Amp Dur Poly Recrt Int Pat Comment   Left FDI Ulnar C8-T1 Nml Nml Nml 0 Nml Nml 0 Nml Nml    Left EIP Post Interosseous,  R... C7-C8 Nml Nml Nml 0 Nml Nml 0 Nml Nml    Left Pronator Teres Median C6-C7 Nml Nml Nml 0 Nml Nml 0 Nml Nml    Left Biceps Musculocut C5-C6 Nml Nml Nml 0 Nml Nml 0 Nml Nml    Left Triceps Radial C6-C8 Nml Nml Nml 0 Nml Nml 0 Nml Nml          NCS Waveforms:    Motor                Sensory

## 2021-09-16 NOTE — LETTER
2021       RE: Juarez Jauregui  [address]  [place] [address]     Dear Colleague,    Thank you for referring your patient, Juarez Jauregui, to the [facility] EMG CLINIC [place] at [facility]. Please see a copy of my visit note below.       [facility]             Electrodiagnostic Laboratory    Electromyography Report  Test Date:  2021    Patient: Juarez Jauregui : 1985 Physician: Eric Napier MD   Sex: Male AGE: 36 year Ref Phys:    ID#: [id number]   Technician: Kristy Behling     Clinical Information:      Techniques:  Motor and sensory conduction studies were done with surface recording electrodes. EMG was done with a single use concentric needle electrode.     Results:  The left median motor nerve conduction study was normal.  Left ulnar motor nerve conduction study to both the abductor digit he minimi and first dorsal interosseous was normal.  There was no evidence of slowing of motor conduction around the elbow.  Left median and ulnar antidromic sensory nerve action potentials were normal.  The left superficial radial sensory nerve action potential was normal.  Bilateral median and lateral antebrachial cutaneous nerve of the forearm sensory nerve action potentials were normal.  Left median and ulnar sensory distal latencies were compared using the palmar sensory technique.  There was no disproportionate slowing of the left median sensory distal latency.  Left median and ulnar distal motor conduction was compared by stimulating the median nerve and recording from the lumbricals and stimulating the ulnar nerve and recording from the second palmar interosseous at equal distances.  There was no disproportionate slowing of left distal median motor conduction.  Needle exam of the left arm was normal.      Interpretation: The EMG is normal.  There is no EMG evidence for a left ulnar neuropathy, brachial plexopathy  or cervical radiculopathy.        ___________________________  Eric Napier MD    Nerve Conduction Studies  Motor Sites      Latency Amplitude Neg. Amp Diff Segment Distance Velocity Neg. Dur Neg Area Diff Temperature Comment   Site (ms) Norm (mV) Norm %  cm m/s Norm ms %  C    Left Median (APB) Motor   Wrist 3.6  < 4.2 6.3  > 5.0  Wrist-APB 8   4.7  32.5    Elbow 7.8 - 6.0 - -4.8 Elbow-Wrist 23 55  > 48 4.9 -4.2 32.5    Left Median/Ulnar (Lumb-Dors Int II) Motor        Median (Lumb I)   Wrist 3.1 - 0.91 -      9.0  31.9         Ulnar (Dorsal Int (manus))   Wrist 3.0 - 4.3 -      6.0  32.1    Left Ulnar (ADM) Motor   Wrist 2.4  < 3.5 8.9  > 3.0  Wrist-ADM 8   5.8  31.3    Bel Elbow 6.1 - 8.8 - -1.12 Bel Elbow-Wrist 23 62  > 48 5.6 -5.6 31.5    Abv Elbow 7.8 - 8.5 - -3.4 Abv Elbow-Bel Elbow 10 59  > 48 5.7 -3.0 31.6    Left Ulnar (FDI) Motor   Wrist 3.7 - 13.5 -      4.4  32    Bel Elbow 6.9 - 12.5 - -7.4 Bel Elbow-Wrist 21 66  > 48 4.8 1.45 31.9    Abv Elbow 8.4 - 12.6 - 0.80 Abv Elbow-Bel Elbow 10 67  > 48 5.3 5.7 31.9      Sensory Sites      Onset Lat Neg Peak Lat Amp (O-P) Amp (P-P) Segment Distance Velocity Temperature Comment   Site ms ms  V Norm  V  cm m/s Norm  C    Left Lateral Antebrachial Cutaneous Sensory   Lat Biceps-Lat Forearm 1.48 2.0 45 - 41 Lat Biceps-Lat Forearm 10 68 - 32.5    Right Lateral Antebrachial Cutaneous Sensory   Lat Biceps-Lat Forearm 1.35 2.0 24 - 28 Lat Biceps-Lat Forearm 10 74 - 32.7    Left Medial Antebrachial Cutaneous Sensory   Elbow-Med Forearm 1.75 2.4 14 - 13 Elbow-Med Forearm 12 69 - 32.5    Right Medial Antebrachial Cutaneous Sensory   Elbow-Med Forearm 1.73 2.2 8 - 11 Elbow-Med Forearm 12 69 - 32    Left Median Sensory   Wrist-Dig II 2.4 3.0 33  > 10 50 Wrist-Dig II 14 58  > 48 28.9    Left Median-Ulnar Palmar Sensory        Median   Palm-Wrist 1.50 1.90 46 - 68 Palm-Wrist 8 53 - 32.4         Ulnar   Palm-Wrist 1.35 1.83 15 - 19 Palm-Wrist 8 59 - 32.4    Left Radial  Sensory   Forearm-Wrist 1.90 2.4 23  > 15 27 Forearm-Wrist 10 53 - 32.5    Left Ulnar Sensory   Wrist-Dig V 2.3 2.9 30  > 8 18 Wrist-Dig V 12.5 54  > 48 30.8      Inter-Nerve Comparisons     Nerve 1 Value 1 Nerve 2 Value 2 Parameter Result Normal   Sensory Sites   L Median Palm-Wrist 1.9 ms L Ulnar Palm-Wrist 1.8 ms Peak Lat Diff 0.07 ms <0.30       Electromyography     Side Muscle Nerve Root Ins Act Fibs/PSW Fasc HF Amp Dur Poly Recrt Int Pat Comment   Left FDI Ulnar C8-T1 Nml Nml Nml 0 Nml Nml 0 Nml Nml    Left EIP Post Interosseous,  R... C7-C8 Nml Nml Nml 0 Nml Nml 0 Nml Nml    Left Pronator Teres Median C6-C7 Nml Nml Nml 0 Nml Nml 0 Nml Nml    Left Biceps Musculocut C5-C6 Nml Nml Nml 0 Nml Nml 0 Nml Nml    Left Triceps Radial C6-C8 Nml Nml Nml 0 Nml Nml 0 Nml Nml          NCS Waveforms:    Motor                Sensory                             Again, thank you for allowing me to participate in the care of your patient.      Sincerely,    Eric Napier MD

## 2021-09-22 NOTE — PROGRESS NOTES
ESTABLISHED PATIENT NEUROLOGY NOTE    DATE OF VISIT: 9/23/2021  CLINIC LOCATION: [facility]  MRN: [id number]  PATIENT NAME: Juarez Jauregui  YOB: 1985    PCP: Danielle Mitchell MD    REASON FOR VISIT:   Chief Complaint   Patient presents with     Follow Up     EMG review      SUBJECTIVE:                                                      HISTORY OF PRESENT ILLNESS: Patient is here to follow up regarding constellation of symptoms including electric buzzing sensation behind both eyes with associated nausea and dizziness, intermittent balance difficulty with associated falls, and persistent left arm numbness (since 2018). Please refer to my initial/other prior notes for further information.    Since the last visit, the patient reports that he had intense episodes of electric buzzing sensation behind his eyes, associated with dizziness last Saturday, Sunday, and Monday.  Tuesday he felt better and was able to exercise, but the symptoms returned later in the day (at the end of workout).  He was not able to start physical therapy because he had surgery (septoplasty).  Denies interval development of new neurological symptoms.    EMG from 9/16/2021 was normal with no evidence of left ulnar neuropathy, brachial plexopathy or cervical radiculopathy.  Tabulated data from EMG report were personally reviewed and independently interpreted.    On review of systems, patient endorses no additional active complaints. Medications, allergies, family and social history were also reviewed. There are no changes reported by patient.  REVIEW OF SYSTEMS:                                                    10-system review was completed. Pertinent positives are included in HPI. The remainder of ROS is negative.  EXAM:                                                    Physical Exam:   Vitals: /69   Pulse 84   Wt 88.6 kg (195 lb 6.4 oz)   SpO2 96%   BMI 28.04 kg/m      General: pt is in  NAD, cooperative.  Skin: normal turgor, moist mucous membranes, no lesions/rashes noticed.  HEENT: ATNC, white sclera, normal conjunctiva.  Respiratory: Symmetric lung excursion, no accessory respiratory muscle use.  Abdomen: Non distended.  Neurological: awake, cooperative, follows commands, the rest of exam was deferred today.  ASSESSMENT AND PLAN:                                                    Assessment: 36-year-old male patient with constellation of symptoms including intermittent electric buzzing sensation behind both eyes radiating to the base of the skull with associated nausea and dizziness, intermittent balance difficulty with associated falls, and persistent left arm numbness since 2018.  His repeat EMG from Sep 2021 was normal.  We reviewed results.  He did not start physical therapy for balance because he recently had septoplasty.    I discussed that I do not see any neurological explanations of his buzzing sensation behind his eyes and balance difficulty.  I would encourage him to consider physical therapy.  He could also explore with his primary care provider or prescribing providers if Cymbalta and mirtazapine could be adjusted to see if it leads to improvement of his symptoms.  Trial of gabapentin could also be considered under the guidance of primary care provider.  ENT referral could be considered to evaluate for otologic causes of his symptoms.    Diagnoses:    ICD-10-CM    1. Paresthesias  R20.2    2. Difficulty balancing  R29.818    3. Numbness and tingling in left hand  R20.0     R20.2      Plan: At today's visit we thoroughly discussed current symptoms, EMG results (normal), available treatment options, and the plan.  I do not think that any additional neurological evaluation is needed at this time.    We discussed that his symptoms might be related to Cymbalta/mirtazapine side effects.  He will reach out to prescribing provider regarding possible adjustment.  We also discussed that  gabapentin could be tried under the guidance of his primary care or mental health providers.    Next follow-up appointment is on as needed basis.    Total Time: 25 minutes spent on the date of the encounter doing chart review, history and exam, documentation and further activities per the note.    Fili Boykin MD  [facility] Neurology  (Chart documentation was completed in part with Dragon voice-recognition software. Even though reviewed, some grammatical, spelling, and word errors may remain.)

## 2021-09-22 NOTE — PATIENT INSTRUCTIONS
AFTER VISIT SUMMARY (AVS):    At today's visit we thoroughly discussed current symptoms, EMG results (normal), available treatment options, and the plan.  I do not think that any additional neurological evaluation is needed at this time.    We discussed that your symptoms might be related to Cymbalta/mirtazapine side effects.  Please talk to prescribing provider regarding possible adjustment.  We also discussed that gabapentin could be tried under the guidance of your primary care or mental health providers.    Next follow-up appointment is on as needed basis.    Please do not hesitate to call me with any questions or concerns.    Thanks.

## 2021-09-23 ENCOUNTER — OFFICE VISIT (OUTPATIENT)
Dept: NEUROLOGY | Facility: CLINIC | Age: 36
End: 2021-09-23
Attending: PSYCHIATRY & NEUROLOGY
Payer: COMMERCIAL

## 2021-09-23 VITALS
HEART RATE: 84 BPM | WEIGHT: 195.4 LBS | SYSTOLIC BLOOD PRESSURE: 101 MMHG | OXYGEN SATURATION: 96 % | BODY MASS INDEX: 28.04 KG/M2 | DIASTOLIC BLOOD PRESSURE: 69 MMHG

## 2021-09-23 DIAGNOSIS — R29.818 DIFFICULTY BALANCING: ICD-10-CM

## 2021-09-23 DIAGNOSIS — R20.2 PARESTHESIAS: Primary | ICD-10-CM

## 2021-09-23 DIAGNOSIS — R20.0 NUMBNESS AND TINGLING IN LEFT HAND: ICD-10-CM

## 2021-09-23 DIAGNOSIS — R20.2 NUMBNESS AND TINGLING IN LEFT HAND: ICD-10-CM

## 2021-09-23 PROCEDURE — 99213 OFFICE O/P EST LOW 20 MIN: CPT | Performed by: PSYCHIATRY & NEUROLOGY

## 2021-09-23 PROCEDURE — G0463 HOSPITAL OUTPT CLINIC VISIT: HCPCS

## 2021-09-23 NOTE — LETTER
9/23/2021         RE: Juarez Jauregui  [address]  [place] [address]        Dear Colleague,    Thank you for referring your patient, Juarez Jauregui, to the [facility] [place]. Please see a copy of my visit note below.    ESTABLISHED PATIENT NEUROLOGY NOTE    DATE OF VISIT: 9/23/2021  CLINIC LOCATION: [facility]  MRN: [id number]  PATIENT NAME: Juarez Jauregui  YOB: 1985    PCP: Danielle Mitchell MD    REASON FOR VISIT:   Chief Complaint   Patient presents with     Follow Up     EMG review      SUBJECTIVE:                                                      HISTORY OF PRESENT ILLNESS: Patient is here to follow up regarding constellation of symptoms including electric buzzing sensation behind both eyes with associated nausea and dizziness, intermittent balance difficulty with associated falls, and persistent left arm numbness (since 2018). Please refer to my initial/other prior notes for further information.    Since the last visit, the patient reports that he had intense episodes of electric buzzing sensation behind his eyes, associated with dizziness last Saturday, Sunday, and Monday.  Tuesday he felt better and was able to exercise, but the symptoms returned later in the day (at the end of workout).  He was not able to start physical therapy because he had surgery (septoplasty).  Denies interval development of new neurological symptoms.    EMG from 9/16/2021 was normal with no evidence of left ulnar neuropathy, brachial plexopathy or cervical radiculopathy.  Tabulated data from EMG report were personally reviewed and independently interpreted.    On review of systems, patient endorses no additional active complaints. Medications, allergies, family and social history were also reviewed. There are no changes reported by patient.  REVIEW OF SYSTEMS:                                                    10-system review was completed.  Pertinent positives are included in HPI. The remainder of ROS is negative.  EXAM:                                                    Physical Exam:   Vitals: /69   Pulse 84   Wt 88.6 kg (195 lb 6.4 oz)   SpO2 96%   BMI 28.04 kg/m      General: pt is in NAD, cooperative.  Skin: normal turgor, moist mucous membranes, no lesions/rashes noticed.  HEENT: ATNC, white sclera, normal conjunctiva.  Respiratory: Symmetric lung excursion, no accessory respiratory muscle use.  Abdomen: Non distended.  Neurological: awake, cooperative, follows commands, the rest of exam was deferred today.  ASSESSMENT AND PLAN:                                                    Assessment: 36-year-old male patient with constellation of symptoms including intermittent electric buzzing sensation behind both eyes radiating to the base of the skull with associated nausea and dizziness, intermittent balance difficulty with associated falls, and persistent left arm numbness since 2018.  His repeat EMG from Sep 2021 was normal.  We reviewed results.  He did not start physical therapy for balance because he recently had septoplasty.    I discussed that I do not see any neurological explanations of his buzzing sensation behind his eyes and balance difficulty.  I would encourage him to consider physical therapy.  He could also explore with his primary care provider or prescribing providers if Cymbalta and mirtazapine could be adjusted to see if it leads to improvement of his symptoms.  Trial of gabapentin could also be considered under the guidance of primary care provider.  ENT referral could be considered to evaluate for otologic causes of his symptoms.    Diagnoses:    ICD-10-CM    1. Paresthesias  R20.2    2. Difficulty balancing  R29.818    3. Numbness and tingling in left hand  R20.0     R20.2      Plan: At today's visit we thoroughly discussed current symptoms, EMG results (normal), available treatment options, and the plan.  I do not  think that any additional neurological evaluation is needed at this time.    We discussed that his symptoms might be related to Cymbalta/mirtazapine side effects.  He will reach out to prescribing provider regarding possible adjustment.  We also discussed that gabapentin could be tried under the guidance of his primary care or mental health providers.    Next follow-up appointment is on as needed basis.    Total Time: 25 minutes spent on the date of the encounter doing chart review, history and exam, documentation and further activities per the note.    Fili Boykin MD  [facility] Neurology  (Chart documentation was completed in part with Dragon voice-recognition software. Even though reviewed, some grammatical, spelling, and word errors may remain.)      Again, thank you for allowing me to participate in the care of your patient.        Sincerely,        Fili Boykin MD

## 2021-09-27 ENCOUNTER — ALLIED HEALTH/NURSE VISIT (OUTPATIENT)
Dept: ALLERGY | Facility: CLINIC | Age: 36
End: 2021-09-27
Payer: COMMERCIAL

## 2021-09-27 DIAGNOSIS — J30.1 SEASONAL ALLERGIC RHINITIS DUE TO POLLEN: Primary | ICD-10-CM

## 2021-09-27 PROCEDURE — 95117 IMMUNOTHERAPY INJECTIONS: CPT

## 2021-09-29 ENCOUNTER — ALLIED HEALTH/NURSE VISIT (OUTPATIENT)
Dept: OTOLARYNGOLOGY | Facility: CLINIC | Age: 36
End: 2021-09-29
Payer: COMMERCIAL

## 2021-09-29 DIAGNOSIS — Z98.890 POSTOPERATIVE STATE: Primary | ICD-10-CM

## 2021-09-29 PROCEDURE — 99207 PR NO CHARGE NURSE ONLY: CPT

## 2021-09-29 NOTE — PROGRESS NOTES
Pt comes in POD 8/31/21 s/p Septorhinoplasty, Bilateral inferior turbinate reduction and outfracture, Nasal valve repair, bilateral, Cosmetic Dorsal Hump Reduction.    Pt's nose remains swollen, although it has greatly improved since his last visit.    He continues to be pleased with the improvement in his breathing and says that he's had to work to remind himself to breathe through his nose, now that it's functional.    Pt also pleased with his profile and became emotional when talking about it.    Encouraged pt to continue with nasal saline spray.    F/u with Dr. Coulter in 2 mos.    Katelin Hatfield RN  9/29/2021 10:24 AM

## 2021-10-03 ENCOUNTER — HEALTH MAINTENANCE LETTER (OUTPATIENT)
Age: 36
End: 2021-10-03

## 2021-10-27 ENCOUNTER — VIRTUAL VISIT (OUTPATIENT)
Dept: FAMILY MEDICINE | Facility: CLINIC | Age: 36
End: 2021-10-27
Payer: COMMERCIAL

## 2021-10-27 DIAGNOSIS — H60.393 INFECTIVE OTITIS EXTERNA, BILATERAL: ICD-10-CM

## 2021-10-27 DIAGNOSIS — R63.5 WEIGHT GAIN: ICD-10-CM

## 2021-10-27 DIAGNOSIS — R20.2 PARESTHESIAS: Primary | ICD-10-CM

## 2021-10-27 RX ORDER — DULOXETIN HYDROCHLORIDE 60 MG/1
120 CAPSULE, DELAYED RELEASE ORAL AT BEDTIME
Qty: 90 CAPSULE | Refills: 0 | COMMUNITY
Start: 2021-10-27

## 2021-10-27 ASSESSMENT — ANXIETY QUESTIONNAIRES
7. FEELING AFRAID AS IF SOMETHING AWFUL MIGHT HAPPEN: NEARLY EVERY DAY
5. BEING SO RESTLESS THAT IT IS HARD TO SIT STILL: MORE THAN HALF THE DAYS
GAD7 TOTAL SCORE: 16
6. BECOMING EASILY ANNOYED OR IRRITABLE: MORE THAN HALF THE DAYS
4. TROUBLE RELAXING: NEARLY EVERY DAY
1. FEELING NERVOUS, ANXIOUS, OR ON EDGE: MORE THAN HALF THE DAYS
2. NOT BEING ABLE TO STOP OR CONTROL WORRYING: MORE THAN HALF THE DAYS
GAD7 TOTAL SCORE: 16
7. FEELING AFRAID AS IF SOMETHING AWFUL MIGHT HAPPEN: NEARLY EVERY DAY
3. WORRYING TOO MUCH ABOUT DIFFERENT THINGS: MORE THAN HALF THE DAYS
8. IF YOU CHECKED OFF ANY PROBLEMS, HOW DIFFICULT HAVE THESE MADE IT FOR YOU TO DO YOUR WORK, TAKE CARE OF THINGS AT HOME, OR GET ALONG WITH OTHER PEOPLE?: VERY DIFFICULT
GAD7 TOTAL SCORE: 16

## 2021-10-27 ASSESSMENT — PATIENT HEALTH QUESTIONNAIRE - PHQ9
SUM OF ALL RESPONSES TO PHQ QUESTIONS 1-9: 12
10. IF YOU CHECKED OFF ANY PROBLEMS, HOW DIFFICULT HAVE THESE PROBLEMS MADE IT FOR YOU TO DO YOUR WORK, TAKE CARE OF THINGS AT HOME, OR GET ALONG WITH OTHER PEOPLE: SOMEWHAT DIFFICULT
SUM OF ALL RESPONSES TO PHQ QUESTIONS 1-9: 12

## 2021-10-27 NOTE — PATIENT INSTRUCTIONS
Recommendations:  Neuro symptoms  Schedule appt with [facility]    Allergies  Appt the allergist about food allergies    Psychiatrist  Mirtazapine and weight gain    Ear infection  1% hydrocortisone ointment on cotton swab  ofloxacillin ear drops as needed  Follow up with ENT if you have recurrence of external ear canals      Influenza vaccine when able

## 2021-10-27 NOTE — PROGRESS NOTES
Ricardo is a 36 year old who is being evaluated via a billable video visit.    Can you please confirm what state you are currently located in? [place]     How would you like to obtain your AVS? MyChart  If the video visit is dropped, the invitation should be resent by: Text to cell phone: mobile   Will anyone else be joining your video visit? No      Video Start Time: 11:49 AM   Video End Time: 12:15 PM  Total video time: 26 minutes    Assessment & Plan     (R20.2) Paresthesias  (primary encounter diagnosis)  Comment: electric buzzing sensation behind both eyes with associated nausea and dizziness, intermittent balance difficulty with associated falls. Current workup with neurologist did not find source.   He would like to seek opinion from a second neurologist.  Plan: Adult Neurology Referral        Referral to [facility] neurology.     (H60.393) Infective otitis externa, bilateral  Comment: recurrent symptoms  Plan: recommend completing ofloxacin drops. May use topical 1% hydrocortisone ointment to ear canals to reduce swelling, pain. See ENT if symptoms do not clear.     (R63.5) Weight gain  Comment: 10 pound weight gain in the past year despite change to healthier diet. Mirtazapine Rx may be partially responsible  Plan: recommend he discuss with his psychiatrist.     28 minutes spend on the date of this encounter doing chart review, history and exam, documentation and further activities as noted above.       Danielle Mitchell MD  Internal Medicine/Pediatrics      I, Smita Orosco, am serving as a scribe to document services personally performed by Dr. Danielle Mitchell, based on data collection and the provider's statements to me. Dr. Mitchell has reviewed, edited, and approved the above note.     Subjective   Ricardo is a 36 year old who presents for the following health issues    HPI     Paresthesias, f/u from neurology consultation  Ricardo has been experiencing an electric buzzing sensation behind both eyes with  "associated nausea and dizziness, intermittent balance difficulty with associated falls. He has had these symptoms, along with persistent left arm numbness since 2018. He saw Dr. Boykin in neurology on 21. Ricardo's symptoms come and go without warning.       EMG of left arm showed no evidence of left ulnar neuropathy, brachial plexopathy or cervical radiculopathy. No neurological explanations of buzzing sensation behind his eyes and balance difficulty were found.     The neurologist recommended discussion with psychiatrist about use of Cymbalta/ mirtazapine. Referral for vestibular therapy was recommended.    They also discussed a trial of gabapentin and ENT referral to evaluate for otologic causes of his symptoms.     Ricardo is interested in getting a second opinion from another neurologist. Ricardo also has many allergies, animals, mold, pollens. Is receiving allergy injections. Wondering if his symptoms could be due to allergies.    Recent ear infections  Ricardo has had two external ear infections (bilateral). The most recent episode was one week ago. He went to the [facility] and was told his ear canals were so swollen that they could not see his TMs. He has been using ofloxacin ear drops, which have been helpful. Symptoms improving. He has not had any other types of infections, but notes one instance of a \"bad allergy attack\" while bringing things in from outside.     Situational stress  Ricardo reports that his maternal grandmother,  this morning at the age of 88. She had Parkinson's disease and a heart murmur. He notes that his M was told that she had one month to live about 8 years ago, so her passing was not completely unexpected. He has lost both grandfathers, an uncle, and now his grandmother during the pandemic.     Ricardo has been working at home during the pandemic and recently received word from his employer that he will be remote permanently.      Discussion on weight  Ricardo has been trying to lose " "weight. He currently weighs 193 pounds. He has greatly changed his diet and is vegetarian, has stopped drinking cows milk, coffee, and soda, and has switched to drinking almond milk. We discussed that mirtazapine can contribute to weight gain, so Ricardo will discuss alternative medications with his psychiatrist at their next appointment.       Review of Systems   Constitutional, HEENT, cardiovascular, pulmonary, gi and gu systems are negative, except as otherwise noted.      Objective    Vitals - Patient Reported  Weight (Patient Reported): 87.5 kg (193 lb)  Height (Patient Reported): 177.8 cm (5' 10\")  BMI (Based on Pt Reported Ht/Wt): 27.69  Vitals:  No vitals were obtained today due to virtual visit.    Physical Exam   GENERAL: Healthy, alert and no distress  EYES: Eyes grossly normal to inspection.  No discharge or erythema, or obvious scleral/conjunctival abnormalities.  RESP: No audible wheeze, cough, or visible cyanosis.  No visible retractions or increased work of breathing.    SKIN: Visible skin clear. No significant rash, abnormal pigmentation or lesions.  NEURO: Cranial nerves grossly intact.  Mentation and speech appropriate for age.  PSYCH: Mentation appears normal, affect normal/bright, judgement and insight intact, normal speech and appearance well-groomed.          Video-Visit Details    Type of service:  Video Visit    Video End Time:12:15 PM    Originating Location (pt. Location): Home    Distant Location (provider location):  [facility]     Platform used for Video Visit: BIMA  Answers for HPI/ROS submitted by the patient on 10/27/2021  If you checked off any problems, how difficult have these problems made it for you to do your work, take care of things at home, or get along with other people?: Somewhat difficult  PHQ9 TOTAL SCORE: 12  LEONOR 7 TOTAL SCORE: 16      "

## 2021-10-28 ASSESSMENT — ANXIETY QUESTIONNAIRES: GAD7 TOTAL SCORE: 16

## 2021-10-28 ASSESSMENT — PATIENT HEALTH QUESTIONNAIRE - PHQ9: SUM OF ALL RESPONSES TO PHQ QUESTIONS 1-9: 12

## 2021-10-30 PROBLEM — J34.89 NASAL OBSTRUCTION: Status: RESOLVED | Noted: 2021-02-26 | Resolved: 2021-10-30

## 2021-10-30 PROBLEM — J34.3 HYPERTROPHY OF INFERIOR NASAL TURBINATE: Status: RESOLVED | Noted: 2021-02-24 | Resolved: 2021-10-30

## 2021-10-30 PROBLEM — G47.33 OBSTRUCTIVE SLEEP APNEA SYNDROME: Status: RESOLVED | Noted: 2021-03-17 | Resolved: 2021-10-30

## 2021-10-30 PROBLEM — J34.2 DEVIATED NASAL SEPTUM: Status: RESOLVED | Noted: 2021-02-24 | Resolved: 2021-10-30

## 2021-11-12 ENCOUNTER — TRANSFERRED RECORDS (OUTPATIENT)
Dept: HEALTH INFORMATION MANAGEMENT | Facility: CLINIC | Age: 36
End: 2021-11-12
Payer: COMMERCIAL

## 2021-11-15 ENCOUNTER — ALLIED HEALTH/NURSE VISIT (OUTPATIENT)
Dept: ALLERGY | Facility: CLINIC | Age: 36
End: 2021-11-15
Payer: COMMERCIAL

## 2021-11-15 DIAGNOSIS — J30.1 SEASONAL ALLERGIC RHINITIS DUE TO POLLEN: Primary | ICD-10-CM

## 2021-11-15 PROCEDURE — 95117 IMMUNOTHERAPY INJECTIONS: CPT

## 2021-11-18 ENCOUNTER — ALLIED HEALTH/NURSE VISIT (OUTPATIENT)
Dept: ALLERGY | Facility: CLINIC | Age: 36
End: 2021-11-18
Payer: COMMERCIAL

## 2021-11-18 DIAGNOSIS — J30.1 SEASONAL ALLERGIC RHINITIS DUE TO POLLEN: Primary | ICD-10-CM

## 2021-11-18 PROCEDURE — 95117 IMMUNOTHERAPY INJECTIONS: CPT

## 2021-11-18 NOTE — PROGRESS NOTES
Patient presented after waiting 30 minutes with no reaction to allergy injections. Discharged from clinic.    Courtney Gama RN

## 2021-11-24 ENCOUNTER — TELEPHONE (OUTPATIENT)
Dept: OTOLARYNGOLOGY | Facility: CLINIC | Age: 36
End: 2021-11-24

## 2021-11-24 ENCOUNTER — OFFICE VISIT (OUTPATIENT)
Dept: OTOLARYNGOLOGY | Facility: CLINIC | Age: 36
End: 2021-11-24
Payer: COMMERCIAL

## 2021-11-24 VITALS — BODY MASS INDEX: 27.63 KG/M2 | TEMPERATURE: 96.4 F | HEIGHT: 70 IN | WEIGHT: 193 LBS

## 2021-11-24 DIAGNOSIS — Z98.890 POSTOPERATIVE STATE: Primary | ICD-10-CM

## 2021-11-24 PROCEDURE — 11900 INJECT SKIN LESIONS </W 7: CPT | Performed by: OTOLARYNGOLOGY

## 2021-11-24 PROCEDURE — 99212 OFFICE O/P EST SF 10 MIN: CPT | Mod: 25 | Performed by: OTOLARYNGOLOGY

## 2021-11-24 ASSESSMENT — PAIN SCALES - GENERAL: PAINLEVEL: NO PAIN (0)

## 2021-11-24 ASSESSMENT — MIFFLIN-ST. JEOR: SCORE: 1811.69

## 2021-11-24 NOTE — TELEPHONE ENCOUNTER
Spoke with patient regarding scheduling a Return in about 2 months (around 1/24/2022) for Follow up. Patient wanted a 3 month Return. Scheduled patient accordingly and sent appointment letter to confirmed address.-Per Patient

## 2021-11-24 NOTE — PROGRESS NOTES
Facial Plastic and Reconstructive Surgery      Juarez Jauregui is doing well  His breathing is optimal. He continues to mold and this is looking great.    I injected kenalog to the supratip area where he has noticeable fullness.     Procedure: Triamcinolone Injection to Hypertrophic/Keloid Scar  Indication: Hypertrophic/ Keloid Scar  Surgeon: Yohana Coulter MD    Verbal consent was obtained. The skin was cleaned with alcohol. 0.1 ml of Kenalog 10mg/ml were injected into the lesion.  Hemostasis was obtained with pressure and guaze. The skin was cleaned.  The patient tolerated the procedure well and there were no complications.  I performed the procedure.

## 2021-11-30 ENCOUNTER — OFFICE VISIT (OUTPATIENT)
Dept: AUDIOLOGY | Facility: CLINIC | Age: 36
End: 2021-11-30
Payer: COMMERCIAL

## 2021-11-30 ENCOUNTER — ALLIED HEALTH/NURSE VISIT (OUTPATIENT)
Dept: ALLERGY | Facility: CLINIC | Age: 36
End: 2021-11-30
Payer: COMMERCIAL

## 2021-11-30 ENCOUNTER — OFFICE VISIT (OUTPATIENT)
Dept: OTOLARYNGOLOGY | Facility: CLINIC | Age: 36
End: 2021-11-30
Payer: COMMERCIAL

## 2021-11-30 VITALS — OXYGEN SATURATION: 98 % | RESPIRATION RATE: 16 BRPM | HEART RATE: 84 BPM

## 2021-11-30 DIAGNOSIS — H93.8X3 PLUGGED FEELING IN EAR, BILATERAL: ICD-10-CM

## 2021-11-30 DIAGNOSIS — H60.8X3 CHRONIC ECZEMATOUS OTITIS EXTERNA OF BOTH EARS: Primary | ICD-10-CM

## 2021-11-30 DIAGNOSIS — H61.23 BILATERAL IMPACTED CERUMEN: ICD-10-CM

## 2021-11-30 DIAGNOSIS — H93.8X3 PLUGGED FEELING IN EAR, BILATERAL: Primary | ICD-10-CM

## 2021-11-30 DIAGNOSIS — J30.1 SEASONAL ALLERGIC RHINITIS DUE TO POLLEN: Primary | ICD-10-CM

## 2021-11-30 PROCEDURE — 99213 OFFICE O/P EST LOW 20 MIN: CPT | Mod: 24 | Performed by: OTOLARYNGOLOGY

## 2021-11-30 PROCEDURE — 92557 COMPREHENSIVE HEARING TEST: CPT | Performed by: AUDIOLOGIST

## 2021-11-30 PROCEDURE — 92550 TYMPANOMETRY & REFLEX THRESH: CPT | Performed by: AUDIOLOGIST

## 2021-11-30 PROCEDURE — 95117 IMMUNOTHERAPY INJECTIONS: CPT

## 2021-11-30 PROCEDURE — 69210 REMOVE IMPACTED EAR WAX UNI: CPT | Performed by: OTOLARYNGOLOGY

## 2021-11-30 PROCEDURE — 99207 PR NO CHARGE LOS: CPT | Performed by: AUDIOLOGIST

## 2021-11-30 RX ORDER — FLUOCINOLONE ACETONIDE 0.11 MG/ML
OIL AURICULAR (OTIC)
Qty: 20 ML | Refills: 1 | Status: SHIPPED | OUTPATIENT
Start: 2021-11-30 | End: 2022-08-03

## 2021-11-30 NOTE — PROGRESS NOTES
Chief Complaint - recurrent ear infections    History of Present Illness - Juarez Jauregui is a 36 year old male who presents to me today with recurrent ear infections, with pain to touch. Has happened 4 times. He gets ear plugging. No drainage. Prior to the last few months he hasn't had infections. One time ear was so swollen they couldn't irrigate. He does have psoriasis. Ears have been itching a lot. He has been given ear drops and antibiotics.     Past Medical History -   Patient Active Problem List   Diagnosis     Strain of neck muscle, initial encounter     Anxiety     History of migraine     Upper back pain     Cervicalgia     Recurrent major depressive disorder, in partial remission (H)     Trauma and stressor-related disorder     H/O multiple allergies     Psoriasis     WILBER (obstructive sleep apnea)       Current Medications -   Current Outpatient Medications:      DULoxetine (CYMBALTA) 60 MG capsule, 60 mg 2 times daily , Disp: 90 capsule, Rfl: 0     LORazepam (ATIVAN) 1 MG tablet, Take 1 tablet (1 mg) by mouth every 8 hours as needed for anxiety, Disp: 20 tablet, Rfl: 0     medical cannabis (Patient's own supply), (The purpose of this order is to document that the patient reports taking medical cannabis.  This is not a prescription, and is not used to certify that the patient has a qualifying medical condition.),  Capsules and vaping once daily, Disp: 0 Information only, Rfl: 0     multivitamin w/minerals (THERA-VIT-M) tablet, Take 1 tablet by mouth daily, Disp: , Rfl:      Omega-3 Fatty Acids (FISH OIL) 1200 MG capsule, Take 1,200 mg by mouth At Bedtime , Disp: , Rfl:      ORDER FOR ALLERGEN IMMUNOTHERAPY, Name of Mix: Mix #1  Cat, Grass, Ragweed, Tree  Cat Hair, Standardized 10,000 BAU/mL, ALK  0.5 ml  Birch Mix PRW 1:20 w/v, HS  0.3 ml Cedar, Red 1:20 w/v, HS  0.3 ml Toño Grass (Std) 100,000 BAU/mL, HS 0.3 ml Ragweed Mixed 1:20 w/v ALK  0.3 ml Diluent: HSA qs to 5ml, Disp: 5 mL, Rfl: PRN     ORDER  FOR ALLERGEN IMMUNOTHERAPY, Name of Mix: Mix #2  Mold Alternaria Tenuis 1:10 w/v, HS  0.2 ml Diluent: HSA qs to 5ml, Disp: 5 mL, Rfl: PRN     prazosin (MINIPRESS) 1 MG capsule, Take 1 mg by mouth nightly as needed, Disp: , Rfl:      prazosin (MINIPRESS) 5 MG capsule, Take 1 at bedtime, may take additonal 1mg dose at bedtime prn. follows with psychiatry, Disp: 90 capsule, Rfl: 0     sildenafil (VIAGRA) 100 MG tablet, Take 1 tablet (100 mg) by mouth daily as needed (ED), Disp: 6 tablet, Rfl: 3     study - emtricitabine-tenofovir 200-300, IDS#[id number], (TRUVADA) per tablet, Take 1 tablet by mouth daily, Disp: 30 tablet, Rfl:      Vitamin D, Cholecalciferol, 25 MCG (1000 UT) CAPS, Take 1 capsule by mouth daily, Disp: 90 capsule, Rfl: 3     mirtazapine (REMERON) 15 MG tablet, Take 1 at bedtime, follows with psychiatrist (Patient not taking: Reported on 11/24/2021), Disp: 90 tablet, Rfl: 0     sodium chloride (OCEAN) 0.65 % nasal spray, Spray 1 spray into both nostrils 4 times daily (Patient not taking: Reported on 11/30/2021), Disp: 44 mL, Rfl: 1    Allergies -   Allergies   Allergen Reactions     Neosporin Af [Miconazole]      Adhesive Tape Rash     Topical creams: neosporin and bacitracin     Bacitracin Rash     Bacitracin-Polymyxin B Rash and GI Disturbance     No Clinical Screening - See Comments Rash     Topical creams: neosporin and bacitracin     Triple Antibiotic Rash       Social History -   Social History     Socioeconomic History     Marital status: Single     Spouse name: Not on file     Number of children: Not on file     Years of education: Not on file     Highest education level: Not on file   Occupational History     Not on file   Tobacco Use     Smoking status: Never Smoker     Smokeless tobacco: Never Used   Substance and Sexual Activity     Alcohol use: Yes     Comment: social     Drug use: No     Sexual activity: Not on file   Other Topics Concern     Not on file   Social History Narrative     Not on file      Social Determinants of Health     Financial Resource Strain: Not on file   Food Insecurity: Not on file   Transportation Needs: Not on file   Physical Activity: Not on file   Stress: Not on file   Social Connections: Not on file   Intimate Partner Violence: Not on file   Housing Stability: Not on file       Family History -   Family History   Problem Relation Age of Onset     Pre-Diabetes Mother      Hyperthyroidism Mother      Parkinsonism Maternal Grandmother      Heart Failure Maternal Grandmother          age 80     Arrhythmia Maternal Grandmother         on blood thinners     Cancer Maternal Grandmother         TOB, ? esophageal     Dementia Maternal Grandfather      Prostate Cancer Maternal Grandfather      Hypothyroidism Maternal Uncle        Review of Systems - As per HPI and PMHx, otherwise 7 system review of the head and neck negative.    Physical Exam  Pulse 84   Resp 16   SpO2 98%   General - The patient is in no distress.  Alert and oriented to person and place, answers questions and cooperates with examination appropriately.   Voice and Breathing - The patient was breathing comfortably without the use of accessory muscles. There was no wheezing, stridor, or stertor.  The patients voice was clear and strong.  Ears - both ears had significant debri not allowing visualization of the tympanic membrane. See below for cleaning.   Eyes - Extraocular movements intact.  Sclera were not icteric or injected.  Neck - Soft, non-tender. Palpation of the occipital, submental, submandibular, internal jugular chain, and supraclavicular nodes did not demonstrateany abnormal lymph nodes or masses. No parotid masses. Palpation of the thyroid was soft and smooth, with no nodules or goiter appreciated.  The trachea was mobile and midline.  Neurological - Cranial nerves 2 through 12 were grossly intact. House-Brackmann grade 1 out of 6 bilaterally.    PROCEDURE - bilateral ear cerumen and debri removal    I laid  the patient supine in the exam chair. Using the binocular microscope I used a #5 suction and suctioned and debrided the cerumen and debri in both canals. I removed all the wax and then could see each tympanic membrane. Canals have eczema with dry flaky skin. Tympanic membranes appeared intact. No evidence of middle ear effusion.    Audiogram - type A tymps bilateral. Normal hearing.     Assessment and Plan - Juarezprudence Jauregui is a 36 year old male has recurrent otitis externa. This is likely due to chronic eczematous otitis externa. I cleaned both ears today. Keep ears dry. Use dermotic for itching. Return prn.     Roberto Funk MD  Otolaryngology  [facility]

## 2021-11-30 NOTE — PROGRESS NOTES
AUDIOLOGY REPORT:    Patient was referred from ENT by Dr. Funk for audiology evaluation. The patient reports that he has had four ear infections since he had nose surgery. He reports that he had surgery due to sinus problems, but had not had infections since he was a young child. The patient reports that he is also having other testing done with neurology. He has been having dizziness. There are concerns about autoimmune disorders and it was recommended that he have his eyes and ears tested. The patient reports that he has psoriasis and his ears itch all the time. He reports a history of occasional noise exposure from music and crowds. He notes a family history of hearing loss with age. The patient reports that he does not have tinnitus.    Testing:    Otoscopy:   Otoscopic exam indicates ears are clear of cerumen bilaterally     Tympanograms:    RIGHT: normal eardrum mobility     LEFT:   normal eardrum mobility    Reflexes (reported by stimulus ear):  RIGHT: Ipsilateral is present at normal levels  RIGHT: Contralateral is present at normal levels  LEFT:   Ipsilateral is present at normal levels  LEFT:   Contralateral is present at normal levels    Thresholds:   Pure Tone Thresholds assessed using conventional audiometry with good reliability from 250-8000 Hz bilaterally using insert earphones and circumaural headphones     RIGHT:  normal hearing sensitivity at all tested frequencies     LEFT:    normal hearing sensitivity at all tested frequencies     Speech Reception Threshold:    RIGHT: 10 dB HL    LEFT:   5 dB HL  Results are in agreement with pure tone average.     Word Recognition Score:     RIGHT: 100% at 50 dB HL using NU-6 recorded word list.    LEFT:   100% at 50 dB HL using NU-6 recorded word list.    Discussed results with the patient.     Patient was returned to ENT for follow up.     Courtney Gross, CCC-A  Licensed Audiologist #[id number]  11/30/2021

## 2021-11-30 NOTE — LETTER
11/30/2021         RE: Juarez Jauregui  [address]  [place] [address]        Dear Colleague,    Thank you for referring your patient, Juarez Jauregui, to the [facility]. Please see a copy of my visit note below.    Chief Complaint - recurrent ear infections    History of Present Illness - Juarez Jauregui is a 36 year old male who presents to me today with recurrent ear infections, with pain to touch. Has happened 4 times. He gets ear plugging. No drainage. Prior to the last few months he hasn't had infections. One time ear was so swollen they couldn't irrigate. He does have psoriasis. Ears have been itching a lot. He has been given ear drops and antibiotics.     Past Medical History -   Patient Active Problem List   Diagnosis     Strain of neck muscle, initial encounter     Anxiety     History of migraine     Upper back pain     Cervicalgia     Recurrent major depressive disorder, in partial remission (H)     Trauma and stressor-related disorder     H/O multiple allergies     Psoriasis     WILBER (obstructive sleep apnea)       Current Medications -   Current Outpatient Medications:      DULoxetine (CYMBALTA) 60 MG capsule, 60 mg 2 times daily , Disp: 90 capsule, Rfl: 0     LORazepam (ATIVAN) 1 MG tablet, Take 1 tablet (1 mg) by mouth every 8 hours as needed for anxiety, Disp: 20 tablet, Rfl: 0     medical cannabis (Patient's own supply), (The purpose of this order is to document that the patient reports taking medical cannabis.  This is not a prescription, and is not used to certify that the patient has a qualifying medical condition.),  Capsules and vaping once daily, Disp: 0 Information only, Rfl: 0     multivitamin w/minerals (THERA-VIT-M) tablet, Take 1 tablet by mouth daily, Disp: , Rfl:      Omega-3 Fatty Acids (FISH OIL) 1200 MG capsule, Take 1,200 mg by mouth At Bedtime , Disp: , Rfl:      ORDER FOR ALLERGEN IMMUNOTHERAPY, Name of Mix: Mix #1  Cat, Grass, Ragweed, Tree   Cat Hair, Standardized 10,000 BAU/mL, ALK  0.5 ml  Birch Mix PRW 1:20 w/v, HS  0.3 ml Cedar, Red 1:20 w/v, HS  0.3 ml Toño Grass (Std) 100,000 BAU/mL, HS 0.3 ml Ragweed Mixed 1:20 w/v ALK  0.3 ml Diluent: HSA qs to 5ml, Disp: 5 mL, Rfl: PRN     ORDER FOR ALLERGEN IMMUNOTHERAPY, Name of Mix: Mix #2  Mold Alternaria Tenuis 1:10 w/v, HS  0.2 ml Diluent: HSA qs to 5ml, Disp: 5 mL, Rfl: PRN     prazosin (MINIPRESS) 1 MG capsule, Take 1 mg by mouth nightly as needed, Disp: , Rfl:      prazosin (MINIPRESS) 5 MG capsule, Take 1 at bedtime, may take additonal 1mg dose at bedtime prn. follows with psychiatry, Disp: 90 capsule, Rfl: 0     sildenafil (VIAGRA) 100 MG tablet, Take 1 tablet (100 mg) by mouth daily as needed (ED), Disp: 6 tablet, Rfl: 3     study - emtricitabine-tenofovir 200-300, IDS#[id number], (TRUVADA) per tablet, Take 1 tablet by mouth daily, Disp: 30 tablet, Rfl:      Vitamin D, Cholecalciferol, 25 MCG (1000 UT) CAPS, Take 1 capsule by mouth daily, Disp: 90 capsule, Rfl: 3     mirtazapine (REMERON) 15 MG tablet, Take 1 at bedtime, follows with psychiatrist (Patient not taking: Reported on 11/24/2021), Disp: 90 tablet, Rfl: 0     sodium chloride (OCEAN) 0.65 % nasal spray, Spray 1 spray into both nostrils 4 times daily (Patient not taking: Reported on 11/30/2021), Disp: 44 mL, Rfl: 1    Allergies -   Allergies   Allergen Reactions     Neosporin Af [Miconazole]      Adhesive Tape Rash     Topical creams: neosporin and bacitracin     Bacitracin Rash     Bacitracin-Polymyxin B Rash and GI Disturbance     No Clinical Screening - See Comments Rash     Topical creams: neosporin and bacitracin     Triple Antibiotic Rash       Social History -   Social History     Socioeconomic History     Marital status: Single     Spouse name: Not on file     Number of children: Not on file     Years of education: Not on file     Highest education level: Not on file   Occupational History     Not on file   Tobacco Use     Smoking  status: Never Smoker     Smokeless tobacco: Never Used   Substance and Sexual Activity     Alcohol use: Yes     Comment: social     Drug use: No     Sexual activity: Not on file   Other Topics Concern     Not on file   Social History Narrative     Not on file     Social Determinants of Health     Financial Resource Strain: Not on file   Food Insecurity: Not on file   Transportation Needs: Not on file   Physical Activity: Not on file   Stress: Not on file   Social Connections: Not on file   Intimate Partner Violence: Not on file   Housing Stability: Not on file       Family History -   Family History   Problem Relation Age of Onset     Pre-Diabetes Mother      Hyperthyroidism Mother      Parkinsonism Maternal Grandmother      Heart Failure Maternal Grandmother          age 80     Arrhythmia Maternal Grandmother         on blood thinners     Cancer Maternal Grandmother         TOB, ? esophageal     Dementia Maternal Grandfather      Prostate Cancer Maternal Grandfather      Hypothyroidism Maternal Uncle        Review of Systems - As per HPI and PMHx, otherwise 7 system review of the head and neck negative.    Physical Exam  Pulse 84   Resp 16   SpO2 98%   General - The patient is in no distress.  Alert and oriented to person and place, answers questions and cooperates with examination appropriately.   Voice and Breathing - The patient was breathing comfortably without the use of accessory muscles. There was no wheezing, stridor, or stertor.  The patients voice was clear and strong.  Ears - both ears had significant debri not allowing visualization of the tympanic membrane. See below for cleaning.   Eyes - Extraocular movements intact.  Sclera were not icteric or injected.  Neck - Soft, non-tender. Palpation of the occipital, submental, submandibular, internal jugular chain, and supraclavicular nodes did not demonstrateany abnormal lymph nodes or masses. No parotid masses. Palpation of the thyroid was soft and  smooth, with no nodules or goiter appreciated.  The trachea was mobile and midline.  Neurological - Cranial nerves 2 through 12 were grossly intact. House-Brackmann grade 1 out of 6 bilaterally.    PROCEDURE - bilateral ear cerumen and debri removal    I laid the patient supine in the exam chair. Using the binocular microscope I used a #5 suction and suctioned and debrided the cerumen and debri in both canals. I removed all the wax and then could see each tympanic membrane. Canals have eczema with dry flaky skin. Tympanic membranes appeared intact. No evidence of middle ear effusion.    Audiogram - type A tymps bilateral. Normal hearing.     Assessment and Plan - Juarez Jauregui is a 36 year old male has recurrent otitis externa. This is likely due to chronic eczematous otitis externa. I cleaned both ears today. Keep ears dry. Use dermotic for itching. Return prn.     Roberto Funk MD  Otolaryngology  [facility]        Again, thank you for allowing me to participate in the care of your patient.        Sincerely,        Roberto Funk MD     Wheelchair/Stroller

## 2021-12-04 ENCOUNTER — MYC MEDICAL ADVICE (OUTPATIENT)
Dept: FAMILY MEDICINE | Facility: CLINIC | Age: 36
End: 2021-12-04
Payer: COMMERCIAL

## 2021-12-06 ENCOUNTER — OFFICE VISIT (OUTPATIENT)
Dept: FAMILY MEDICINE | Facility: CLINIC | Age: 36
End: 2021-12-06
Payer: COMMERCIAL

## 2021-12-06 ENCOUNTER — ALLIED HEALTH/NURSE VISIT (OUTPATIENT)
Dept: ALLERGY | Facility: CLINIC | Age: 36
End: 2021-12-06
Payer: COMMERCIAL

## 2021-12-06 VITALS
OXYGEN SATURATION: 97 % | RESPIRATION RATE: 16 BRPM | BODY MASS INDEX: 25.78 KG/M2 | TEMPERATURE: 97.8 F | HEART RATE: 88 BPM | HEIGHT: 71 IN | WEIGHT: 184.12 LBS | DIASTOLIC BLOOD PRESSURE: 89 MMHG | SYSTOLIC BLOOD PRESSURE: 125 MMHG

## 2021-12-06 DIAGNOSIS — J30.1 SEASONAL ALLERGIC RHINITIS DUE TO POLLEN: Primary | ICD-10-CM

## 2021-12-06 DIAGNOSIS — R25.1 TREMOR: ICD-10-CM

## 2021-12-06 DIAGNOSIS — R11.2 NAUSEA AND VOMITING, INTRACTABILITY OF VOMITING NOT SPECIFIED, UNSPECIFIED VOMITING TYPE: Primary | ICD-10-CM

## 2021-12-06 PROCEDURE — 95117 IMMUNOTHERAPY INJECTIONS: CPT

## 2021-12-06 RX ORDER — GABAPENTIN 100 MG/1
100 CAPSULE ORAL 3 TIMES DAILY
COMMUNITY
End: 2021-12-20 | Stop reason: SINTOL

## 2021-12-06 RX ORDER — CLONIDINE HYDROCHLORIDE 0.1 MG/1
0.1 TABLET ORAL 2 TIMES DAILY
COMMUNITY
End: 2022-12-21

## 2021-12-06 RX ORDER — ONDANSETRON 4 MG/1
4 TABLET, ORALLY DISINTEGRATING ORAL EVERY 8 HOURS PRN
Qty: 30 TABLET | Refills: 0 | Status: SHIPPED | OUTPATIENT
Start: 2021-12-06 | End: 2022-08-03

## 2021-12-06 ASSESSMENT — MIFFLIN-ST. JEOR: SCORE: 1787.29

## 2021-12-06 ASSESSMENT — PAIN SCALES - GENERAL: PAINLEVEL: NO PAIN (0)

## 2021-12-06 NOTE — NURSING NOTE
"36 year old  Chief Complaint   Patient presents with     Vomiting     patient is complains of vomiting 2-3 times every morning for 3 weeks.        Blood pressure 125/89, pulse 88, temperature 97.8  F (36.6  C), temperature source Temporal, resp. rate 16, height 1.803 m (5' 11\"), weight 83.5 kg (184 lb 1.9 oz), SpO2 97 %. Body mass index is 25.68 kg/m .  Patient Active Problem List   Diagnosis     Strain of neck muscle, initial encounter     Anxiety     History of migraine     Upper back pain     Cervicalgia     Recurrent major depressive disorder, in partial remission (H)     Trauma and stressor-related disorder     H/O multiple allergies     Psoriasis     WILBER (obstructive sleep apnea)       Wt Readings from Last 2 Encounters:   12/06/21 83.5 kg (184 lb 1.9 oz)   11/24/21 87.5 kg (193 lb)     BP Readings from Last 3 Encounters:   12/06/21 125/89   09/23/21 101/69   09/01/21 138/84         Current Outpatient Medications   Medication     cloNIDine (CATAPRES) 0.1 MG tablet     DULoxetine (CYMBALTA) 60 MG capsule     fluocinolone acetonide 0.01 % OIL     gabapentin (NEURONTIN) 100 MG capsule     LORazepam (ATIVAN) 1 MG tablet     medical cannabis (Patient's own supply)     multivitamin w/minerals (THERA-VIT-M) tablet     Omega-3 Fatty Acids (FISH OIL) 1200 MG capsule     ORDER FOR ALLERGEN IMMUNOTHERAPY     ORDER FOR ALLERGEN IMMUNOTHERAPY     prazosin (MINIPRESS) 1 MG capsule     prazosin (MINIPRESS) 5 MG capsule     sildenafil (VIAGRA) 100 MG tablet     study - emtricitabine-tenofovir 200-300, IDS#[id number], (TRUVADA) per tablet     Vitamin D, Cholecalciferol, 25 MCG (1000 UT) CAPS     mirtazapine (REMERON) 15 MG tablet     sodium chloride (OCEAN) 0.65 % nasal spray     No current facility-administered medications for this visit.       Social History     Tobacco Use     Smoking status: Never Smoker     Smokeless tobacco: Never Used   Substance Use Topics     Alcohol use: Yes     Comment: social     Drug use: No       There " are no preventive care reminders to display for this patient.    No results found for: MELY Andrade CMA, RICARDO  December 6, 2021 4:42 PM
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
## 2021-12-07 PROBLEM — R25.1 TREMOR: Status: ACTIVE | Noted: 2021-12-07

## 2021-12-18 ENCOUNTER — ANCILLARY PROCEDURE (OUTPATIENT)
Dept: CT IMAGING | Facility: CLINIC | Age: 36
End: 2021-12-18
Attending: INTERNAL MEDICINE
Payer: COMMERCIAL

## 2021-12-18 DIAGNOSIS — R11.2 NAUSEA AND VOMITING, INTRACTABILITY OF VOMITING NOT SPECIFIED, UNSPECIFIED VOMITING TYPE: ICD-10-CM

## 2021-12-18 PROCEDURE — 74177 CT ABD & PELVIS W/CONTRAST: CPT | Performed by: RADIOLOGY

## 2021-12-18 RX ORDER — IOPAMIDOL 755 MG/ML
112 INJECTION, SOLUTION INTRAVASCULAR ONCE
Status: COMPLETED | OUTPATIENT
Start: 2021-12-18 | End: 2021-12-18

## 2021-12-18 RX ADMIN — IOPAMIDOL 112 ML: 755 INJECTION, SOLUTION INTRAVASCULAR at 14:40

## 2021-12-20 NOTE — TELEPHONE ENCOUNTER
Unable to discontinue the gabapentin in the chart, as patient is reporting the prescribing provider has stopped this due to adverse effects.     The following medications are ordered in a more recent encounter, so you cannot change them here:   - gabapentin (NEURONTIN) 100 MG capsule    Chana Gaytan MS RN-BC  12/20/21  10:05 AM

## 2021-12-21 ENCOUNTER — TELEPHONE (OUTPATIENT)
Dept: NEUROSURGERY | Facility: CLINIC | Age: 36
End: 2021-12-21
Payer: COMMERCIAL

## 2021-12-21 DIAGNOSIS — M54.2 CERVICALGIA: Primary | ICD-10-CM

## 2021-12-21 NOTE — TELEPHONE ENCOUNTER
[facility] Call Center    Phone Message    May a detailed message be left on voicemail: yes     Reason for Call: Appointment Intake    Referring Provider Name: Danielle Mitchell  Diagnosis and/or Symptoms:  Cervicalgia  Action Taken: Message routed to:  [facility] Neurosurgery    Travel Screening: Not Applicable

## 2021-12-23 NOTE — TELEPHONE ENCOUNTER
Writer ALON for pt to call back and schedule a visit    Please schedule a new, In person or virtual visit with Allyson Sierra for next available    Araceli Hernandez

## 2021-12-24 NOTE — TELEPHONE ENCOUNTER
Writer spoke with pt and scheduled a virtual visit with Allyson Sierra on 12/27 at 11:30am    Araceli Hernandez

## 2021-12-24 NOTE — TELEPHONE ENCOUNTER
SPINE PATIENTS - NEW PROTOCOL PREVISIT    RECORDS RECEIVED FROM: Internal   REASON FOR VISIT: Cervicalgia   Date of Appt: 12/27/21   NOTES (FOR ALL VISITS) STATUS DETAILS   OFFICE NOTE from referring provider Internal Dr Danielle Mitchell @ [facility] PCP:  12/6/21   OFFICE NOTE from other specialist N/A    DISCHARGE SUMMARY from hospital N/A    DISCHARGE REPORT from ER N/A    EMG REPORT Internal [facility]:  9/16/21   MEDICATION LIST Internal    IMAGING  (FOR ALL VISITS)     MRI (HEAD, NECK, SPINE) Internal [facility]:  Cervical Spine 1/29/19   XRAY (SPINE) *NEUROSURGERY* Internal [facility]:  XR Cervical Spine 2/5/19   CT (HEAD, NECK, SPINE) N/A

## 2021-12-27 ENCOUNTER — PRE VISIT (OUTPATIENT)
Dept: NEUROSURGERY | Facility: CLINIC | Age: 36
End: 2021-12-27

## 2021-12-27 ENCOUNTER — VIRTUAL VISIT (OUTPATIENT)
Dept: NEUROSURGERY | Facility: CLINIC | Age: 36
End: 2021-12-27
Payer: COMMERCIAL

## 2021-12-27 DIAGNOSIS — M54.2 CERVICALGIA: ICD-10-CM

## 2021-12-27 PROCEDURE — 99203 OFFICE O/P NEW LOW 30 MIN: CPT | Mod: 95 | Performed by: NURSE PRACTITIONER
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
## 2021-12-27 NOTE — PATIENT INSTRUCTIONS
~No further imaging or testing recommended from Neurosurgical standpoint   ~Agree with 2nd opinion from [facility]  ~Continue per recommendations of Dr Mitchell, primary care.   ~Unless the patient develops new pain or symptoms, no further imaging is required, and at this time, he can return to the Neurosurgery Clinic  on an as-needed basis        At the end of the visit, all the patient's questions and concerns had been addressed and the patient was agreeable with the plan of care as outlined above. The patient has our office contact information at [phone], and knows to call with any questions, concerns, or changes in condition.

## 2022-02-23 ENCOUNTER — OFFICE VISIT (OUTPATIENT)
Dept: OTOLARYNGOLOGY | Facility: CLINIC | Age: 37
End: 2022-02-23
Payer: COMMERCIAL

## 2022-02-23 VITALS — HEIGHT: 71 IN | WEIGHT: 184 LBS | BODY MASS INDEX: 25.76 KG/M2

## 2022-02-23 DIAGNOSIS — Z98.890 POSTOPERATIVE STATE: Primary | ICD-10-CM

## 2022-02-23 PROCEDURE — 99212 OFFICE O/P EST SF 10 MIN: CPT | Mod: 25 | Performed by: OTOLARYNGOLOGY

## 2022-02-23 PROCEDURE — 11900 INJECT SKIN LESIONS </W 7: CPT | Performed by: OTOLARYNGOLOGY

## 2022-02-23 ASSESSMENT — PAIN SCALES - GENERAL: PAINLEVEL: NO PAIN (0)

## 2022-02-23 NOTE — NURSING NOTE
"Chief Complaint   Patient presents with     RECHECK     2 month follow up       Height 1.803 m (5' 11\"), weight 83.5 kg (184 lb).    Ethan Stallworth, EMT  "

## 2022-02-23 NOTE — PROGRESS NOTES
Facial Plastic and Reconstructive Surgery      Juarez Jauregui is doing very well.  He states that this has been a significant improvement in his quality of life his breathing is wonderful.  He states he is also managed congestion very well.  He continues to have a little bit fullness of the supratip that responded last time.  At this will perform another steroid injection today.  He will follow up in 3 months.      Procedure: Triamcinolone Injection to Hypertrophic/Keloid Scar  Indication: Hypertrophic/ Keloid Scar  Surgeon: Yohana Coulter MD    Verbal consent was obtained. The skin was cleaned with alcohol. 0.1 ml of Kenalog 40 were injected into the lesion.  Hemostasis was obtained with pressure and guaze. The skin was cleaned.  The patient tolerated the procedure well and there were no complications.  I performed the procedure.

## 2022-02-23 NOTE — LETTER
2/23/2022       RE: Juarez Jauregui  [address]  [place] [address]     Dear Colleague,    Thank you for referring your patient, Juarez Jauregui, to the [facility] E[facility] NOSE AND THROAT CLINIC [place] at [facility]. Please see a copy of my visit note below.    Facial Plastic and Reconstructive Surgery      Juarez Jauregui is doing very well.  He states that this has been a significant improvement in his quality of life his breathing is wonderful.  He states he is also managed congestion very well.  He continues to have a little bit fullness of the supratip that responded last time.  At this will perform another steroid injection today.  He will follow up in 3 months.      Procedure: Triamcinolone Injection to Hypertrophic/Keloid Scar  Indication: Hypertrophic/ Keloid Scar  Surgeon: Yohana Coulter MD    Verbal consent was obtained. The skin was cleaned with alcohol. 0.1 ml of Kenalog 40 were injected into the lesion.  Hemostasis was obtained with pressure and guaze. The skin was cleaned.  The patient tolerated the procedure well and there were no complications.  I performed the procedure.        Again, thank you for allowing me to participate in the care of your patient.      Sincerely,    Yoahna Coulter MD

## 2022-03-01 NOTE — LETTER
11/24/2021       RE: Juarez Jauregui  [address]  [place] [address]     Dear Colleague,    Thank you for referring your patient, Juarez Jauregui, to the [facility] EAR NOSE AND THROAT CLINIC [place] at [facility]. Please see a copy of my visit note below.    Facial Plastic and Reconstructive Surgery      Juarez Jauregui is doing well  His breathing is optimal. He continues to mold and this is looking great.    I injected kenalog to the supratip area where he has noticeable fullness.     Procedure: Triamcinolone Injection to Hypertrophic/Keloid Scar  Indication: Hypertrophic/ Keloid Scar  Surgeon: Yohana Coulter MD    Verbal consent was obtained. The skin was cleaned with alcohol. 0.1 ml of Kenalog 10mg/ml were injected into the lesion.  Hemostasis was obtained with pressure and guaze. The skin was cleaned.  The patient tolerated the procedure well and there were no complications.  I performed the procedure.          Again, thank you for allowing me to participate in the care of your patient.      Sincerely,    Yohana Coulter MD      
30 days

## 2022-04-04 ENCOUNTER — OFFICE VISIT (OUTPATIENT)
Dept: FAMILY MEDICINE | Facility: CLINIC | Age: 37
End: 2022-04-04
Payer: COMMERCIAL

## 2022-04-04 VITALS
HEIGHT: 70 IN | BODY MASS INDEX: 24.48 KG/M2 | WEIGHT: 171 LBS | HEART RATE: 76 BPM | DIASTOLIC BLOOD PRESSURE: 87 MMHG | SYSTOLIC BLOOD PRESSURE: 122 MMHG | OXYGEN SATURATION: 96 % | TEMPERATURE: 98.2 F | RESPIRATION RATE: 12 BRPM

## 2022-04-04 DIAGNOSIS — Z00.00 ROUTINE GENERAL MEDICAL EXAMINATION AT A HEALTH CARE FACILITY: Primary | ICD-10-CM

## 2022-04-04 DIAGNOSIS — L40.9 PSORIASIS: ICD-10-CM

## 2022-04-04 DIAGNOSIS — E78.1 HYPERTRIGLYCERIDEMIA: ICD-10-CM

## 2022-04-04 LAB
CHOLEST SERPL-MCNC: 173 MG/DL
FASTING STATUS PATIENT QL REPORTED: YES
HDLC SERPL-MCNC: 35 MG/DL
LDLC SERPL CALC-MCNC: 118 MG/DL
NONHDLC SERPL-MCNC: 138 MG/DL
TRIGL SERPL-MCNC: 102 MG/DL

## 2022-04-04 PROCEDURE — 80061 LIPID PANEL: CPT | Performed by: INTERNAL MEDICINE

## 2022-04-04 RX ORDER — RISANKIZUMAB-RZAA 75 MG/0.83
150 KIT SUBCUTANEOUS ONCE
Qty: 1.66 ML | Refills: 4 | COMMUNITY
Start: 2022-04-04 | End: 2022-12-21

## 2022-04-04 ASSESSMENT — PATIENT HEALTH QUESTIONNAIRE - PHQ9
5. POOR APPETITE OR OVEREATING: NEARLY EVERY DAY
SUM OF ALL RESPONSES TO PHQ QUESTIONS 1-9: 17

## 2022-04-04 ASSESSMENT — ANXIETY QUESTIONNAIRES
3. WORRYING TOO MUCH ABOUT DIFFERENT THINGS: NEARLY EVERY DAY
IF YOU CHECKED OFF ANY PROBLEMS ON THIS QUESTIONNAIRE, HOW DIFFICULT HAVE THESE PROBLEMS MADE IT FOR YOU TO DO YOUR WORK, TAKE CARE OF THINGS AT HOME, OR GET ALONG WITH OTHER PEOPLE: VERY DIFFICULT
6. BECOMING EASILY ANNOYED OR IRRITABLE: MORE THAN HALF THE DAYS
5. BEING SO RESTLESS THAT IT IS HARD TO SIT STILL: NOT AT ALL
2. NOT BEING ABLE TO STOP OR CONTROL WORRYING: MORE THAN HALF THE DAYS
1. FEELING NERVOUS, ANXIOUS, OR ON EDGE: NEARLY EVERY DAY
GAD7 TOTAL SCORE: 14
7. FEELING AFRAID AS IF SOMETHING AWFUL MIGHT HAPPEN: SEVERAL DAYS
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
## 2022-04-04 NOTE — LETTER
April 4, 2022    RE: Juarez Jauregui  [address] [place] [address]      To whom it may concern,    I am the primary care physician for Juarez Jauregui.  Due to current chronic medical conditions, I am recommending he abstain from going to a gym.     Effective date --today  Length of recommendation--one year.    Sincerely,              Danielle Mitchell MD  Internal Medicine/Pediatrics

## 2022-04-04 NOTE — LETTER
April 8, 2022    RE: Juarez Jauregui  [address] [place] [address]      To whom it may concern,    I am the primary care physician for Juarez A Juventino.  Due to current chronic medical conditions, I am recommending he abstain from going to a gym.     Effective date -- April 1, 2022    Length of recommendation--one year, April 1, 2023      Sincerely,          Danielle Mitchell MD  Internal Medicine/Pediatrics       None

## 2022-04-04 NOTE — NURSING NOTE
"36 year old  Chief Complaint   Patient presents with     Physical     Health update     PCMS       Blood pressure 122/87, pulse 76, temperature 98.2  F (36.8  C), temperature source Skin, resp. rate 12, height 1.79 m (5' 10.47\"), weight 77.6 kg (171 lb), SpO2 96 %. Body mass index is 24.21 kg/m .  Patient Active Problem List   Diagnosis     Strain of neck muscle, initial encounter     Anxiety     History of migraine     Upper back pain     Cervicalgia     Recurrent major depressive disorder, in partial remission (H)     Trauma and stressor-related disorder     H/O multiple allergies     Psoriasis     WILBER (obstructive sleep apnea)     Nausea and vomiting, intractability of vomiting not specified, unspecified vomiting type     Tremor     Hypertriglyceridemia       Wt Readings from Last 2 Encounters:   04/04/22 77.6 kg (171 lb)   02/23/22 83.5 kg (184 lb)     BP Readings from Last 3 Encounters:   04/04/22 122/87   12/06/21 125/89   09/23/21 101/69         Current Outpatient Medications   Medication     cloNIDine (CATAPRES) 0.1 MG tablet     DULoxetine (CYMBALTA) 60 MG capsule     fluocinolone acetonide 0.01 % OIL     LORazepam (ATIVAN) 1 MG tablet     medical cannabis (Patient's own supply)     multivitamin w/minerals (THERA-VIT-M) tablet     Omega-3 Fatty Acids (FISH OIL) 1200 MG capsule     ondansetron (ZOFRAN-ODT) 4 MG ODT tab     prazosin (MINIPRESS) 1 MG capsule     prazosin (MINIPRESS) 5 MG capsule     study - emtricitabine-tenofovir 200-300, IDS#[id number], (TRUVADA) per tablet     Vitamin D, Cholecalciferol, 25 MCG (1000 UT) CAPS     amitriptyline (ELAVIL) 25 MG tablet     ORDER FOR ALLERGEN IMMUNOTHERAPY     ORDER FOR ALLERGEN IMMUNOTHERAPY     sildenafil (VIAGRA) 100 MG tablet     No current facility-administered medications for this visit.       Social History     Tobacco Use     Smoking status: Never Smoker     Smokeless tobacco: Never Used   Vaping Use     Vaping Use: Never used   Substance Use Topics     " Alcohol use: Yes     Comment: social     Drug use: Yes     Types: Marijuana       There are no preventive care reminders to display for this patient.    No results found for: PAP      April 4, 2022 1:24 PM

## 2022-04-04 NOTE — PATIENT INSTRUCTIONS
[facility]  ?nausea treatment if zofran not covered  ?meclizine, scopolamine, other?    [facility]    [facility] FAX number [phone]    Preventive Health Recommendations  Male Ages 26 - 39    Yearly exam:             See your health care provider every year in order to  o   Review health changes.   o   Discuss preventive care.    o   Review your medicines if your doctor has prescribed any.    You should be tested each year for STDs (sexually transmitted diseases), if you re at risk.     After age 35, talk to your provider about cholesterol testing. If you are at risk for heart disease, have your cholesterol tested at least every 5 years.     If you are at risk for diabetes, you should have a diabetes test (fasting glucose).  Shots: Get a flu shot each year. Get a tetanus shot every 10 years.     Nutrition:    Eat at least 5 servings of fruits and vegetables daily.     Eat whole-grain bread, whole-wheat pasta and brown rice instead of white grains and rice.     Get adequate Calcium and Vitamin D.     Lifestyle    Exercise for at least 150 minutes a week (30 minutes a day, 5 days a week). This will help you control your weight and prevent disease.     Limit alcohol to one drink per day.     No smoking.     Wear sunscreen to prevent skin cancer.     See your dentist every six months for an exam and cleaning.

## 2022-04-05 ASSESSMENT — ANXIETY QUESTIONNAIRES: GAD7 TOTAL SCORE: 14

## 2022-04-12 ENCOUNTER — MYC MEDICAL ADVICE (OUTPATIENT)
Dept: ALLERGY | Facility: CLINIC | Age: 37
End: 2022-04-12
Payer: COMMERCIAL

## 2022-04-28 ENCOUNTER — TRANSFERRED RECORDS (OUTPATIENT)
Dept: HEALTH INFORMATION MANAGEMENT | Facility: CLINIC | Age: 37
End: 2022-04-28
Payer: COMMERCIAL

## 2022-04-29 ENCOUNTER — TRANSFERRED RECORDS (OUTPATIENT)
Dept: HEALTH INFORMATION MANAGEMENT | Facility: CLINIC | Age: 37
End: 2022-04-29
Payer: COMMERCIAL

## 2022-05-24 ENCOUNTER — OFFICE VISIT (OUTPATIENT)
Dept: ALLERGY | Facility: CLINIC | Age: 37
End: 2022-05-24
Payer: COMMERCIAL

## 2022-05-24 VITALS
HEART RATE: 96 BPM | HEIGHT: 70 IN | OXYGEN SATURATION: 97 % | WEIGHT: 172.6 LBS | SYSTOLIC BLOOD PRESSURE: 124 MMHG | BODY MASS INDEX: 24.71 KG/M2 | DIASTOLIC BLOOD PRESSURE: 82 MMHG | RESPIRATION RATE: 12 BRPM

## 2022-05-24 DIAGNOSIS — J30.1 SEASONAL ALLERGIC RHINITIS DUE TO POLLEN: ICD-10-CM

## 2022-05-24 DIAGNOSIS — J30.89 ALLERGIC RHINITIS DUE TO MOLD: ICD-10-CM

## 2022-05-24 DIAGNOSIS — T78.1XXA ADVERSE REACTION TO FOOD, INITIAL ENCOUNTER: Primary | ICD-10-CM

## 2022-05-24 DIAGNOSIS — J30.81 ALLERGIC RHINITIS DUE TO ANIMALS: ICD-10-CM

## 2022-05-24 PROCEDURE — 99213 OFFICE O/P EST LOW 20 MIN: CPT | Mod: 25 | Performed by: ALLERGY & IMMUNOLOGY

## 2022-05-24 PROCEDURE — 95004 PERQ TESTS W/ALRGNC XTRCS: CPT | Performed by: ALLERGY & IMMUNOLOGY

## 2022-05-24 ASSESSMENT — PAIN SCALES - GENERAL: PAINLEVEL: NO PAIN (0)

## 2022-05-24 ASSESSMENT — ENCOUNTER SYMPTOMS
TREMORS: 1
HEMATOLOGIC/LYMPHATIC NEGATIVE: 1
APPETITE CHANGE: 1
EYES NEGATIVE: 1
UNEXPECTED WEIGHT CHANGE: 1
VOMITING: 1
BACK PAIN: 1
WEAKNESS: 1
NUMBNESS: 1
SPEECH DIFFICULTY: 1
RESPIRATORY NEGATIVE: 1
ENDOCRINE NEGATIVE: 1
PSYCHIATRIC NEGATIVE: 1
ALLERGIC/IMMUNOLOGIC NEGATIVE: 1
DIZZINESS: 1
CARDIOVASCULAR NEGATIVE: 1
HEADACHES: 1
NAUSEA: 1
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
## 2022-05-24 NOTE — PROGRESS NOTES
Juarez Jauregui was seen in the Allergy Clinic at [facility].      Juarez Jauregui is a 36 year old Choose not to answer male who is seen today for a follow-up visit. He has previously been seen for allergic rhinitis and was receiving allergen immunotherapy treatment. His last injection was on 21. 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 He has been having issues with mobility/transportation and inquires as to whether it is reasonable to stop immunotherapy at this time. Additionally, Juarez did have a septorhinoplasty last September. Since then he has felt better and has had significant improvement in his rhinosinusitis symptoms. He used fluticasone nasal spray once recently but has otherwise not taken antihistamines or other medications for allergy symptoms.    Juarez's other concern today is possible food sensitivities. He struggles quite a bit with nausea and has limited his diet due to this. He did switch from cow's milk to plant based milks for awhile and noticed improvement in his symptoms but switched back to regular milk due to the cost. He reports having acute symptoms to shellfish after eating this as a child and has avoided it since. He does eat regular fish. Juarez does not have any other history of symptoms that are consistent with an IgE mediated hypersensitivity reaction.      Past Medical History:   Diagnosis Date     Motion sickness      Obstructive sleep apnea syndrome      WILBER (obstructive sleep apnea)      PONV (postoperative nausea and vomiting)      Prostatitis      Family History   Problem Relation Age of Onset     Pre-Diabetes Mother      Hyperthyroidism Mother      Parkinsonism Maternal Grandmother      Heart Failure Maternal Grandmother          age 80     Arrhythmia Maternal Grandmother         on blood thinners     Cancer Maternal Grandmother          TOB, ? esophageal     Dementia Maternal Grandfather      Prostate Cancer Maternal Grandfather      Hypothyroidism Maternal Uncle      Social History     Tobacco Use     Smoking status: Never Smoker     Smokeless tobacco: Never Used   Vaping Use     Vaping Use: Never used   Substance Use Topics     Alcohol use: Yes     Comment: social     Drug use: Yes     Types: Marijuana     Social History     Social History Narrative     Not on file       Past medical, family, and social history were reviewed.    Review of Systems   Constitutional: Positive for appetite change and unexpected weight change.   HENT: Negative.    Eyes: Negative.    Respiratory: Negative.    Cardiovascular: Negative.    Gastrointestinal: Positive for nausea and vomiting.   Endocrine: Negative.    Musculoskeletal: Positive for back pain.   Skin: Negative.    Allergic/Immunologic: Negative.    Neurological: Positive for dizziness, tremors, syncope, speech difficulty, weakness, numbness and headaches.   Hematological: Negative.    Psychiatric/Behavioral: Negative.          Current Outpatient Medications:      cloNIDine (CATAPRES) 0.1 MG tablet, Take 0.1 mg by mouth 2 times daily, Disp: , Rfl:      DULoxetine (CYMBALTA) 60 MG capsule, 120 mg At Bedtime , Disp: 90 capsule, Rfl: 0     fluocinolone acetonide 0.01 % OIL, Apply 5 drops in affected ear 1-2 times daily for 5-7 days as needed for itching., Disp: 20 mL, Rfl: 1     LORazepam (ATIVAN) 1 MG tablet, Take 1 tablet (1 mg) by mouth every 8 hours as needed for anxiety, Disp: 20 tablet, Rfl: 0     medical cannabis (Patient's own supply), (The purpose of this order is to document that the patient reports taking medical cannabis.  This is not a prescription, and is not used to certify that the patient has a qualifying medical condition.),  Capsules and vaping once daily, Disp: 0 Information only, Rfl: 0     multivitamin w/minerals (THERA-VIT-M) tablet, Take 1 tablet by mouth daily, Disp: , Rfl:      Omega-3  "Fatty Acids (FISH OIL) 1200 MG capsule, Take 1,200 mg by mouth At Bedtime , Disp: , Rfl:      ondansetron (ZOFRAN-ODT) 4 MG ODT tab, Take 1 tablet (4 mg) by mouth every 8 hours as needed for nausea, Disp: 30 tablet, Rfl: 0     prazosin (MINIPRESS) 1 MG capsule, Take 1 mg by mouth nightly as needed, Disp: , Rfl:      prazosin (MINIPRESS) 5 MG capsule, Take 1 at bedtime, may take additonal 1mg dose at bedtime prn. follows with psychiatry, Disp: 90 capsule, Rfl: 0     SKYRIZI, 150 MG DOSE, 75 MG/0.83ML subcutaneous, Inject 1.66 mLs (150 mg) Subcutaneous once for 1 dose, Disp: 1.66 mL, Rfl: 4     study - emtricitabine-tenofovir 200-300, IDS#[id number], (TRUVADA) per tablet, Take 1 tablet by mouth daily, Disp: 30 tablet, Rfl:      Vitamin D, Cholecalciferol, 25 MCG (1000 UT) CAPS, Take 2 capsules by mouth daily , Disp: 90 capsule, Rfl: 3  Allergies   Allergen Reactions     Neosporin Af [Miconazole]      Adhesive Tape Rash     Topical creams: neosporin and bacitracin     Bacitracin Rash     Bacitracin-Polymyxin B Rash and GI Disturbance     Covid-19 (Mrna) Vaccine Rash     On the booster vaccine.     No Clinical Screening - See Comments Rash     Topical creams: neosporin and bacitracin     Triple Antibiotic Rash       EXAM:   /82   Pulse 96   Resp 12   Ht 1.778 m (5' 10\")   Wt 78.3 kg (172 lb 9.6 oz)   SpO2 97%   BMI 24.77 kg/m    GENERAL APPEARANCE: alert, appears to have depressed affect and anxiety, smiling and cooperative  SKIN: no rashes, no lesions, no ecchymoses, no petechiae, no nodules, no jaundice, no purpura, no wounds  HEAD: atraumatic, normocephalic  EYES: lids and lashes normal, conjunctivae and sclerae clear  ENT: no scars or lesions, tongue midline and normal, soft palate, uvula, and tonsils normal  NECK: no asymmetry, masses, or scars  LUNGS: unlabored respirations, no intercostal retractions or accessory muscle use, clear to auscultation without rales or wheezes  HEART: regular rate and rhythm " without murmurs and normal S1 and S2  MUSCULOSKELETAL: no musculoskeletal defects are noted  NEURO: no focal deficits noted  PSYCH: does not appear depressed or anxious      WORKUP:  Skin testing    FOOD ALLERGEN PERCUTANEOUS SKIN TESTING  [employer]  5/24/2022   Consent Y   Ordering Physician Dr. Fiore   Interpreting Physician Dr. Fiore   Testing Technician Silviano [facility]   Time start: 10:09 AM   Time End: 10:24 AM   Positive Control: Histatrol*ALK 1 mg/ml 5/15   Negative Control: 50% Glycerin**Radha Nkechi 0   Shrimp 1:20 (W/F in millimeters) 0   Lobster 1:20 (W/F in millimeters) 0   Crab 1:20 (W/F in millimeters) 0   Clam 1:20 (W/F in millimeters) 0   Oyster 1:20 (W/F in millimeters) 0   Scallops 1:20 (W/F in millimeters) 0      Appropriate response to controls, all others negative    ASSESSMENT/PLAN:  Juarez Jauregui is a 36 year old male here for a follow-up visit.    1. Adverse reaction to food, initial encounter - Juarez reports he has chronic symptoms of nausea and vomiting. He has not correlated these symptoms with any specific foods. He reports a remote history of a reaction to shellfish as a child and has avoided this food since. We discussed the recommendations and indication for allergy testing as well as the differences between food allergies and intolerances and/or sensitivities. Skin testing to shellfish today was negative.    - information provided regarding lactose intolerance - recommend switching to lactose free products vs trial of lactase enzyme replacement therapy  - may introduce shellfish into the diet as desired  - ALLERGY SKIN TESTS,ALLERGENS    2. Seasonal allergic rhinitis due to pollen    - plan to hold allergen immunotherapy treatment for now  - resume cetirizine if needed - 10mg daily  - resume fluticasone if needed - 1 to 2 sprays in each nostril daily     3. Allergic rhinitis due to animals - see above    4. Allergic rhinitis due to mold - see above      Follow-up in  the allergy clinic as needed      Thank you for allowing me to participate in the care of Juarez Jauregui.      Jennifer Fiore MD, FAAAAI  Allergy/Immunology  [facility]      Chart documentation done in part with Dragon Voice Recognition Software. Although reviewed after completion, some word and grammatical errors may remain.

## 2022-05-24 NOTE — PATIENT INSTRUCTIONS
If you have any questions regarding your allergies, asthma, or what we discussed during your visit today please call the allergy clinic or contact us via [employer].    [facility] Allergy RN Line: [phone] - call this number with any questions during or after business/clinic hours  [facility] Scheduling Line: [phone]  [facility] Scheduling Line: [phone] - this number is ONLY for scheduling at the [facility] and should not be used to get in touch with the allergy team    All visits for food challenges, medication/drug allergy testing, and drug challenges MUST be scheduled through the allergy clinic nurse. Please call the nurse at [phone] or send a [employer] message for scheduling. Appointments for these visits that are made through the schedulers or via [employer] may be cancelled or rescheduled.    Clinic Schedule:   [place] - Monday, Tuesday, and Thursday  [address] [place] [address]    [facility] - Wednesday  [address] [place] [address].      Testing for shellfish is negative    FOOD ALLERGEN PERCUTANEOUS SKIN TESTING  [employer]  5/24/2022   Consent Y   Ordering Physician Dr. Fiore   Interpreting Physician Dr. Fiore   Testing Technician Silviano   [facility]   Time start: 10:09 AM   Time End: 10:24 AM   Positive Control: Histatrol*ALK 1 mg/ml 5/15   Negative Control: 50% Glycerin**Radha Nkechi 0   Shrimp 1:20 (W/F in millimeters) 0   Lobster 1:20 (W/F in millimeters) 0   Crab 1:20 (W/F in millimeters) 0   Clam 1:20 (W/F in millimeters) 0   Oyster 1:20 (W/F in millimeters) 0   Scallops 1:20 (W/F in millimeters) 0   Tuna  1:20 (W/F in millimeters) 0   Cod 1:20 (W/F in millimeters) 0   [place]  1:20 (W/F in millimeters) 0

## 2022-05-25 ENCOUNTER — OFFICE VISIT (OUTPATIENT)
Dept: OTOLARYNGOLOGY | Facility: CLINIC | Age: 37
End: 2022-05-25
Payer: COMMERCIAL

## 2022-05-25 VITALS — BODY MASS INDEX: 24.34 KG/M2 | HEIGHT: 70 IN | WEIGHT: 170 LBS

## 2022-05-25 DIAGNOSIS — Z98.890 POSTOPERATIVE STATE: Primary | ICD-10-CM

## 2022-05-25 PROCEDURE — 99212 OFFICE O/P EST SF 10 MIN: CPT | Performed by: OTOLARYNGOLOGY

## 2022-05-25 ASSESSMENT — PATIENT HEALTH QUESTIONNAIRE - PHQ9: SUM OF ALL RESPONSES TO PHQ QUESTIONS 1-9: 19

## 2022-05-25 ASSESSMENT — PAIN SCALES - GENERAL: PAINLEVEL: NO PAIN (0)

## 2022-05-25 NOTE — PROGRESS NOTES
Facial Plastic and Reconstructive Surgery      Juarez BRANDON Jauregui is doing well  His breathing is great and he is happy with his nose.   He continues to have vestibular and ocular therapy for his post concussive effects.    Exam today was stable  I will see him back as needed in the future.

## 2022-05-25 NOTE — LETTER
5/25/2022       RE: Juarez Jauregui  [address]  [place] [address]     Dear Colleague,    Thank you for referring your patient, Juarez Jauregui, to the [facility] [place] at [facility]. Please see a copy of my visit note below.    Facial Plastic and Reconstructive Surgery      Juarez Jauregui is doing well  His breathing is great and he is happy with his nose.   He continues to have vestibular and ocular therapy for his post concussive effects.    Exam today was stable  I will see him back as needed in the future.       Again, thank you for allowing me to participate in the care of your patient.      Sincerely,    Yohana Coulter MD

## 2022-05-25 NOTE — NURSING NOTE
"Chief Complaint   Patient presents with     RECHECK     3 month follow up       Height 1.778 m (5' 10\"), weight 77.1 kg (170 lb).      Depression Response    Patient completed the PHQ-9 assessment for depression and scored >9? Yes  Question 9 on the PHQ-9 was positive for suicidality? No  Does patient have current mental health provider? Yes    Is this a virtual visit? No    I personally notified the following: clinic nurse             Ethan Stallworth, EMT  "
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
## 2022-08-03 ENCOUNTER — OFFICE VISIT (OUTPATIENT)
Dept: FAMILY MEDICINE | Facility: CLINIC | Age: 37
End: 2022-08-03

## 2022-08-03 VITALS
OXYGEN SATURATION: 94 % | TEMPERATURE: 97 F | HEART RATE: 112 BPM | SYSTOLIC BLOOD PRESSURE: 120 MMHG | HEIGHT: 70 IN | BODY MASS INDEX: 24.63 KG/M2 | DIASTOLIC BLOOD PRESSURE: 86 MMHG | WEIGHT: 172.04 LBS

## 2022-08-03 DIAGNOSIS — F07.81 POST CONCUSSIVE SYNDROME: ICD-10-CM

## 2022-08-03 DIAGNOSIS — F43.10 PTSD (POST-TRAUMATIC STRESS DISORDER): Primary | ICD-10-CM

## 2022-08-03 RX ORDER — PROMETHAZINE HYDROCHLORIDE 25 MG/1
TABLET ORAL
COMMUNITY
Start: 2022-07-26 | End: 2022-12-21

## 2022-08-03 ASSESSMENT — ANXIETY QUESTIONNAIRES
IF YOU CHECKED OFF ANY PROBLEMS ON THIS QUESTIONNAIRE, HOW DIFFICULT HAVE THESE PROBLEMS MADE IT FOR YOU TO DO YOUR WORK, TAKE CARE OF THINGS AT HOME, OR GET ALONG WITH OTHER PEOPLE: EXTREMELY DIFFICULT
GAD7 TOTAL SCORE: 17
3. WORRYING TOO MUCH ABOUT DIFFERENT THINGS: NEARLY EVERY DAY
GAD7 TOTAL SCORE: 17
6. BECOMING EASILY ANNOYED OR IRRITABLE: MORE THAN HALF THE DAYS
5. BEING SO RESTLESS THAT IT IS HARD TO SIT STILL: MORE THAN HALF THE DAYS
1. FEELING NERVOUS, ANXIOUS, OR ON EDGE: NEARLY EVERY DAY
7. FEELING AFRAID AS IF SOMETHING AWFUL MIGHT HAPPEN: SEVERAL DAYS
2. NOT BEING ABLE TO STOP OR CONTROL WORRYING: NEARLY EVERY DAY

## 2022-08-03 ASSESSMENT — PATIENT HEALTH QUESTIONNAIRE - PHQ9
5. POOR APPETITE OR OVEREATING: NEARLY EVERY DAY
SUM OF ALL RESPONSES TO PHQ QUESTIONS 1-9: 19
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
## 2022-09-11 ENCOUNTER — HEALTH MAINTENANCE LETTER (OUTPATIENT)
Age: 37
End: 2022-09-11

## 2022-10-04 PROBLEM — R11.2 NAUSEA AND VOMITING: Status: ACTIVE | Noted: 2021-12-06

## 2022-12-21 ENCOUNTER — VIRTUAL VISIT (OUTPATIENT)
Dept: FAMILY MEDICINE | Facility: CLINIC | Age: 37
End: 2022-12-21
Payer: COMMERCIAL

## 2022-12-21 DIAGNOSIS — F43.10 PTSD (POST-TRAUMATIC STRESS DISORDER): ICD-10-CM

## 2022-12-21 DIAGNOSIS — F07.81 POST CONCUSSIVE SYNDROME: Primary | ICD-10-CM

## 2022-12-21 DIAGNOSIS — R11.2 NAUSEA AND VOMITING, UNSPECIFIED VOMITING TYPE: ICD-10-CM

## 2022-12-21 DIAGNOSIS — Z59.9 FINANCIAL DIFFICULTIES: ICD-10-CM

## 2022-12-21 SDOH — ECONOMIC STABILITY - INCOME SECURITY: PROBLEM RELATED TO HOUSING AND ECONOMIC CIRCUMSTANCES, UNSPECIFIED: Z59.9

## 2022-12-21 ASSESSMENT — PATIENT HEALTH QUESTIONNAIRE - PHQ9
SUM OF ALL RESPONSES TO PHQ QUESTIONS 1-9: 11
10. IF YOU CHECKED OFF ANY PROBLEMS, HOW DIFFICULT HAVE THESE PROBLEMS MADE IT FOR YOU TO DO YOUR WORK, TAKE CARE OF THINGS AT HOME, OR GET ALONG WITH OTHER PEOPLE: EXTREMELY DIFFICULT
SUM OF ALL RESPONSES TO PHQ QUESTIONS 1-9: 11

## 2022-12-21 ASSESSMENT — ANXIETY QUESTIONNAIRES
7. FEELING AFRAID AS IF SOMETHING AWFUL MIGHT HAPPEN: NEARLY EVERY DAY
1. FEELING NERVOUS, ANXIOUS, OR ON EDGE: NEARLY EVERY DAY
6. BECOMING EASILY ANNOYED OR IRRITABLE: SEVERAL DAYS
GAD7 TOTAL SCORE: 14
3. WORRYING TOO MUCH ABOUT DIFFERENT THINGS: MORE THAN HALF THE DAYS
5. BEING SO RESTLESS THAT IT IS HARD TO SIT STILL: NOT AT ALL
7. FEELING AFRAID AS IF SOMETHING AWFUL MIGHT HAPPEN: NEARLY EVERY DAY
2. NOT BEING ABLE TO STOP OR CONTROL WORRYING: MORE THAN HALF THE DAYS
4. TROUBLE RELAXING: NEARLY EVERY DAY
IF YOU CHECKED OFF ANY PROBLEMS ON THIS QUESTIONNAIRE, HOW DIFFICULT HAVE THESE PROBLEMS MADE IT FOR YOU TO DO YOUR WORK, TAKE CARE OF THINGS AT HOME, OR GET ALONG WITH OTHER PEOPLE: VERY DIFFICULT
8. IF YOU CHECKED OFF ANY PROBLEMS, HOW DIFFICULT HAVE THESE MADE IT FOR YOU TO DO YOUR WORK, TAKE CARE OF THINGS AT HOME, OR GET ALONG WITH OTHER PEOPLE?: VERY DIFFICULT
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
## 2023-04-05 ENCOUNTER — OFFICE VISIT (OUTPATIENT)
Dept: FAMILY MEDICINE | Facility: CLINIC | Age: 38
End: 2023-04-05
Payer: COMMERCIAL

## 2023-04-05 VITALS
DIASTOLIC BLOOD PRESSURE: 89 MMHG | WEIGHT: 181 LBS | HEIGHT: 70 IN | RESPIRATION RATE: 15 BRPM | TEMPERATURE: 97.4 F | HEART RATE: 101 BPM | OXYGEN SATURATION: 97 % | BODY MASS INDEX: 25.91 KG/M2 | SYSTOLIC BLOOD PRESSURE: 121 MMHG

## 2023-04-05 DIAGNOSIS — Z00.00 ANNUAL PHYSICAL EXAM: Primary | ICD-10-CM

## 2023-04-05 DIAGNOSIS — F33.41 RECURRENT MAJOR DEPRESSIVE DISORDER, IN PARTIAL REMISSION (H): ICD-10-CM

## 2023-04-05 DIAGNOSIS — R11.2 NAUSEA AND VOMITING, UNSPECIFIED VOMITING TYPE: ICD-10-CM

## 2023-04-05 DIAGNOSIS — F07.81 POST CONCUSSIVE SYNDROME: ICD-10-CM

## 2023-04-05 DIAGNOSIS — Z13.220 SCREENING FOR LIPID DISORDERS: ICD-10-CM

## 2023-04-05 DIAGNOSIS — R00.2 PALPITATIONS: ICD-10-CM

## 2023-04-05 LAB
ANION GAP SERPL CALCULATED.3IONS-SCNC: 15 MMOL/L (ref 7–15)
BUN SERPL-MCNC: 10.2 MG/DL (ref 6–20)
CALCIUM SERPL-MCNC: 9.6 MG/DL (ref 8.6–10)
CHLORIDE SERPL-SCNC: 102 MMOL/L (ref 98–107)
CHOLEST SERPL-MCNC: 184 MG/DL
CREAT SERPL-MCNC: 0.99 MG/DL (ref 0.67–1.17)
DEPRECATED HCO3 PLAS-SCNC: 24 MMOL/L (ref 22–29)
ERYTHROCYTE [DISTWIDTH] IN BLOOD BY AUTOMATED COUNT: 13.2 % (ref 10–15)
GFR SERPL CREATININE-BSD FRML MDRD: >90 ML/MIN/1.73M2
GLUCOSE SERPL-MCNC: 84 MG/DL (ref 70–99)
HCT VFR BLD AUTO: 47.9 % (ref 40–53)
HDLC SERPL-MCNC: 37 MG/DL
HGB BLD-MCNC: 16.3 G/DL (ref 13.3–17.7)
LDLC SERPL CALC-MCNC: 111 MG/DL
MCH RBC QN AUTO: 31.3 PG (ref 26.5–33)
MCHC RBC AUTO-ENTMCNC: 34 G/DL (ref 31.5–36.5)
MCV RBC AUTO: 92 FL (ref 78–100)
NONHDLC SERPL-MCNC: 147 MG/DL
PLATELET # BLD AUTO: 344 10E3/UL (ref 150–450)
POTASSIUM SERPL-SCNC: 4.1 MMOL/L (ref 3.4–5.3)
RBC # BLD AUTO: 5.21 10E6/UL (ref 4.4–5.9)
SODIUM SERPL-SCNC: 141 MMOL/L (ref 136–145)
TRIGL SERPL-MCNC: 182 MG/DL
TSH SERPL DL<=0.005 MIU/L-ACNC: 0.76 UIU/ML (ref 0.3–4.2)
WBC # BLD AUTO: 9.6 10E3/UL (ref 4–11)

## 2023-04-05 PROCEDURE — 80061 LIPID PANEL: CPT | Performed by: INTERNAL MEDICINE

## 2023-04-05 PROCEDURE — 84443 ASSAY THYROID STIM HORMONE: CPT | Performed by: INTERNAL MEDICINE

## 2023-04-05 PROCEDURE — 80048 BASIC METABOLIC PNL TOTAL CA: CPT | Performed by: INTERNAL MEDICINE

## 2023-04-05 RX ORDER — ADALIMUMAB 4 MG/ML
KIT INJECTION
COMMUNITY
Start: 2023-03-20 | End: 2023-04-08 | Stop reason: DRUGHIGH

## 2023-04-05 ASSESSMENT — PATIENT HEALTH QUESTIONNAIRE - PHQ9
SUM OF ALL RESPONSES TO PHQ QUESTIONS 1-9: 16
5. POOR APPETITE OR OVEREATING: NEARLY EVERY DAY

## 2023-04-05 ASSESSMENT — ANXIETY QUESTIONNAIRES
5. BEING SO RESTLESS THAT IT IS HARD TO SIT STILL: NEARLY EVERY DAY
3. WORRYING TOO MUCH ABOUT DIFFERENT THINGS: NEARLY EVERY DAY
GAD7 TOTAL SCORE: 18
2. NOT BEING ABLE TO STOP OR CONTROL WORRYING: NEARLY EVERY DAY
IF YOU CHECKED OFF ANY PROBLEMS ON THIS QUESTIONNAIRE, HOW DIFFICULT HAVE THESE PROBLEMS MADE IT FOR YOU TO DO YOUR WORK, TAKE CARE OF THINGS AT HOME, OR GET ALONG WITH OTHER PEOPLE: VERY DIFFICULT
GAD7 TOTAL SCORE: 18
6. BECOMING EASILY ANNOYED OR IRRITABLE: NEARLY EVERY DAY
7. FEELING AFRAID AS IF SOMETHING AWFUL MIGHT HAPPEN: NOT AT ALL
1. FEELING NERVOUS, ANXIOUS, OR ON EDGE: NEARLY EVERY DAY
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
## 2023-04-06 RX ORDER — PROMETHAZINE HYDROCHLORIDE 25 MG/1
25 SUPPOSITORY RECTAL EVERY 6 HOURS PRN
COMMUNITY
Start: 2023-04-06 | End: 2023-12-13

## 2023-04-08 PROBLEM — J30.89 ALLERGIC RHINITIS DUE TO MOLD: Status: RESOLVED | Noted: 2021-02-01 | Resolved: 2023-04-08

## 2023-04-08 PROBLEM — R00.2 PALPITATIONS: Status: ACTIVE | Noted: 2023-04-08

## 2023-04-08 PROBLEM — J30.81 ALLERGIC RHINITIS DUE TO ANIMALS: Status: RESOLVED | Noted: 2021-02-01 | Resolved: 2023-04-08

## 2023-04-08 PROBLEM — J30.1 SEASONAL ALLERGIC RHINITIS DUE TO POLLEN: Status: RESOLVED | Noted: 2018-03-20 | Resolved: 2023-04-08

## 2023-04-08 RX ORDER — PRAZOSIN HYDROCHLORIDE 5 MG/1
CAPSULE ORAL
Qty: 90 CAPSULE | Refills: 0 | COMMUNITY
Start: 2023-04-08 | End: 2024-08-30

## 2023-04-08 RX ORDER — PRAZOSIN HYDROCHLORIDE 2 MG/1
2 CAPSULE ORAL AT BEDTIME
COMMUNITY
Start: 2023-04-08 | End: 2024-08-30

## 2023-04-20 ENCOUNTER — HOSPITAL ENCOUNTER (OUTPATIENT)
Dept: CARDIOLOGY | Facility: CLINIC | Age: 38
Discharge: HOME | End: 2023-04-20
Attending: INTERNAL MEDICINE | Admitting: INTERNAL MEDICINE
Payer: COMMERCIAL

## 2023-04-20 DIAGNOSIS — R00.2 PALPITATIONS: ICD-10-CM

## 2023-04-20 PROCEDURE — 93242 EXT ECG>48HR<7D RECORDING: CPT

## 2023-05-02 ENCOUNTER — OFFICE VISIT (OUTPATIENT)
Dept: CARDIOLOGY | Facility: CLINIC | Age: 38
End: 2023-05-02
Attending: INTERNAL MEDICINE
Payer: COMMERCIAL

## 2023-05-02 VITALS
OXYGEN SATURATION: 98 % | SYSTOLIC BLOOD PRESSURE: 113 MMHG | HEART RATE: 106 BPM | WEIGHT: 183.4 LBS | HEIGHT: 70 IN | BODY MASS INDEX: 26.26 KG/M2 | DIASTOLIC BLOOD PRESSURE: 81 MMHG

## 2023-05-02 DIAGNOSIS — R07.89 OTHER CHEST PAIN: ICD-10-CM

## 2023-05-02 DIAGNOSIS — R00.2 PALPITATIONS: Primary | ICD-10-CM

## 2023-05-02 DIAGNOSIS — E78.1 HYPERTRIGLYCERIDEMIA: ICD-10-CM

## 2023-05-02 PROCEDURE — 93000 ELECTROCARDIOGRAM COMPLETE: CPT | Performed by: INTERNAL MEDICINE

## 2023-05-02 PROCEDURE — 99204 OFFICE O/P NEW MOD 45 MIN: CPT | Performed by: INTERNAL MEDICINE

## 2023-05-02 RX ORDER — RIZATRIPTAN BENZOATE 5 MG/1
TABLET ORAL
COMMUNITY
Start: 2023-04-06
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
## 2023-05-09 ENCOUNTER — TELEPHONE (OUTPATIENT)
Dept: CARDIOLOGY | Facility: CLINIC | Age: 38
End: 2023-05-09

## 2023-05-09 ENCOUNTER — HOSPITAL ENCOUNTER (OUTPATIENT)
Dept: CARDIOLOGY | Facility: CLINIC | Age: 38
Discharge: HOME | End: 2023-05-09
Attending: INTERNAL MEDICINE | Admitting: INTERNAL MEDICINE
Payer: COMMERCIAL

## 2023-05-09 DIAGNOSIS — R07.89 OTHER CHEST PAIN: Primary | ICD-10-CM

## 2023-05-09 DIAGNOSIS — E78.1 HYPERTRIGLYCERIDEMIA: ICD-10-CM

## 2023-05-09 DIAGNOSIS — I77.810 ASCENDING AORTA DILATATION (H): ICD-10-CM

## 2023-05-09 DIAGNOSIS — R07.89 OTHER CHEST PAIN: ICD-10-CM

## 2023-05-09 DIAGNOSIS — R00.2 PALPITATIONS: ICD-10-CM

## 2023-05-09 LAB — LVEF ECHO: NORMAL

## 2023-05-09 PROCEDURE — 93306 TTE W/DOPPLER COMPLETE: CPT

## 2023-05-09 PROCEDURE — 93306 TTE W/DOPPLER COMPLETE: CPT | Mod: 26 | Performed by: INTERNAL MEDICINE

## 2023-05-09 NOTE — TELEPHONE ENCOUNTER
Echo 5/9/2023 noted. Ordered for work-up of chest pain and palpitations.    Schedule today lists a stress echo was planned but only echo portion was done.    Echo:  Moderately decreased left ventricular systolic function. The visual ejection fraction is 30-35%. There is moderate global hypokinesia of the left ventricle.  The right ventricle is not well visualized. Moderately decreased right ventricular systolic function on limited views.  Moderate aortic root dilatation. Ao. root is 4.7cm. The ascending aorta is Mildly dilated.  No prior study. Findings discussed with Dr. Cottrell.      Per Dr. Cottrell's dictation on 5/2/2023:   My suspicion is Pb is having sinus tachycardia partially due to his medications including amitriptyline.  His tachycardia Karime in the shower may be related to his prazosin.  I will order a modified Ramakrishna protocol stress echocardiogram to look for any underlying structural heart disease and to see what his heart, rhythm and blood pressure does with exercise.  If this is abnormal then we will proceed appropriately.  If this is normal I would consider a calcium score to evaluate his long-term risk for coronary artery disease.    Will message Dr. Cottrell to review
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
## 2023-05-11 NOTE — TELEPHONE ENCOUNTER
Message left to return call. Dr. Cottrell .   Dr. Cottrell F/Up OV order placed.     CT's scheduled 5-17-23.     Dr. Cottrell's reply -   To be safe yes hold Ritalin.  Get him in with a follow-up appointment with me after his CT scans

## 2023-05-11 NOTE — TELEPHONE ENCOUNTER
Spoke with Patient who just spoke with scheduling - Dr. Cottrell F/Up OV is 5-24-23.  And agrees to be safe and will  hold Ritalin.

## 2023-05-17 ENCOUNTER — HOSPITAL ENCOUNTER (OUTPATIENT)
Dept: CARDIOLOGY | Facility: CLINIC | Age: 38
Discharge: HOME | End: 2023-05-17
Attending: INTERNAL MEDICINE | Admitting: INTERNAL MEDICINE
Payer: COMMERCIAL

## 2023-05-17 VITALS — HEART RATE: 66 BPM | SYSTOLIC BLOOD PRESSURE: 127 MMHG | DIASTOLIC BLOOD PRESSURE: 100 MMHG

## 2023-05-17 DIAGNOSIS — R07.89 OTHER CHEST PAIN: ICD-10-CM

## 2023-05-17 DIAGNOSIS — I77.810 ASCENDING AORTA DILATATION (H): ICD-10-CM

## 2023-05-17 PROCEDURE — 71275 CT ANGIOGRAPHY CHEST: CPT

## 2023-05-17 PROCEDURE — 250N000013 HC RX MED GY IP 250 OP 250 PS 637: Performed by: INTERNAL MEDICINE

## 2023-05-17 PROCEDURE — 250N000011 HC RX IP 250 OP 636: Performed by: INTERNAL MEDICINE

## 2023-05-17 PROCEDURE — 71275 CT ANGIOGRAPHY CHEST: CPT | Mod: 26 | Performed by: INTERNAL MEDICINE

## 2023-05-17 PROCEDURE — 75574 CT ANGIO HRT W/3D IMAGE: CPT | Mod: 26 | Performed by: INTERNAL MEDICINE

## 2023-05-17 PROCEDURE — 250N000009 HC RX 250: Performed by: INTERNAL MEDICINE

## 2023-05-17 PROCEDURE — 75574 CT ANGIO HRT W/3D IMAGE: CPT

## 2023-05-17 RX ORDER — METHYLPREDNISOLONE SODIUM SUCCINATE 125 MG/2ML
125 INJECTION, POWDER, LYOPHILIZED, FOR SOLUTION INTRAMUSCULAR; INTRAVENOUS
Status: DISCONTINUED | OUTPATIENT
Start: 2023-05-17 | End: 2023-05-18 | Stop reason: HOSPADM

## 2023-05-17 RX ORDER — NITROGLYCERIN 0.4 MG/1
0.4 TABLET SUBLINGUAL
Status: DISCONTINUED | OUTPATIENT
Start: 2023-05-17 | End: 2023-05-18 | Stop reason: HOSPADM

## 2023-05-17 RX ORDER — ACYCLOVIR 200 MG/1
0-1 CAPSULE ORAL
Status: DISCONTINUED | OUTPATIENT
Start: 2023-05-17 | End: 2023-05-18 | Stop reason: HOSPADM

## 2023-05-17 RX ORDER — IVABRADINE 5 MG/1
5-15 TABLET, FILM COATED ORAL
Status: COMPLETED | OUTPATIENT
Start: 2023-05-17 | End: 2023-05-17

## 2023-05-17 RX ORDER — ONDANSETRON 2 MG/ML
4 INJECTION INTRAMUSCULAR; INTRAVENOUS
Status: DISCONTINUED | OUTPATIENT
Start: 2023-05-17 | End: 2023-05-18 | Stop reason: HOSPADM

## 2023-05-17 RX ORDER — METOPROLOL TARTRATE 25 MG/1
25-100 TABLET, FILM COATED ORAL
Status: COMPLETED | OUTPATIENT
Start: 2023-05-17 | End: 2023-05-17

## 2023-05-17 RX ORDER — DIPHENHYDRAMINE HCL 25 MG
25 CAPSULE ORAL
Status: DISCONTINUED | OUTPATIENT
Start: 2023-05-17 | End: 2023-05-18 | Stop reason: HOSPADM

## 2023-05-17 RX ORDER — DIPHENHYDRAMINE HYDROCHLORIDE 50 MG/ML
25-50 INJECTION INTRAMUSCULAR; INTRAVENOUS
Status: DISCONTINUED | OUTPATIENT
Start: 2023-05-17 | End: 2023-05-18 | Stop reason: HOSPADM

## 2023-05-17 RX ORDER — IOPAMIDOL 755 MG/ML
120 INJECTION, SOLUTION INTRAVASCULAR ONCE
Status: COMPLETED | OUTPATIENT
Start: 2023-05-17 | End: 2023-05-17

## 2023-05-17 RX ORDER — DILTIAZEM HCL 60 MG
120 TABLET ORAL
Status: DISCONTINUED | OUTPATIENT
Start: 2023-05-17 | End: 2023-05-18 | Stop reason: HOSPADM

## 2023-05-17 RX ORDER — METOPROLOL TARTRATE 1 MG/ML
5-15 INJECTION, SOLUTION INTRAVENOUS
Status: DISCONTINUED | OUTPATIENT
Start: 2023-05-17 | End: 2023-05-18 | Stop reason: HOSPADM

## 2023-05-17 RX ORDER — DILTIAZEM HYDROCHLORIDE 5 MG/ML
10-15 INJECTION INTRAVENOUS
Status: DISCONTINUED | OUTPATIENT
Start: 2023-05-17 | End: 2023-05-18 | Stop reason: HOSPADM

## 2023-05-17 RX ADMIN — NITROGLYCERIN 0.4 MG: 0.4 TABLET SUBLINGUAL at 12:36

## 2023-05-17 RX ADMIN — METOPROLOL TARTRATE 10 MG: 5 INJECTION INTRAVENOUS at 11:58

## 2023-05-17 RX ADMIN — METOPROLOL TARTRATE 100 MG: 50 TABLET, FILM COATED ORAL at 10:50

## 2023-05-17 RX ADMIN — METOPROLOL TARTRATE 10 MG: 5 INJECTION INTRAVENOUS at 11:49

## 2023-05-17 RX ADMIN — METOPROLOL TARTRATE 10 MG: 5 INJECTION INTRAVENOUS at 11:39

## 2023-05-17 RX ADMIN — IOPAMIDOL 120 ML: 755 INJECTION, SOLUTION INTRAVENOUS at 13:07

## 2023-05-17 RX ADMIN — IVABRADINE 15 MG: 5 TABLET, FILM COATED ORAL at 10:51

## 2023-05-17 RX ADMIN — METOPROLOL TARTRATE 10 MG: 5 INJECTION INTRAVENOUS at 12:05

## 2023-05-19 ENCOUNTER — TELEPHONE (OUTPATIENT)
Dept: CARDIOLOGY | Facility: CLINIC | Age: 38
End: 2023-05-19
Payer: COMMERCIAL

## 2023-05-19 NOTE — TELEPHONE ENCOUNTER
Radiology view of CT scans 5/19/2023 noted. Copy sent to PCP, Dr. Mitchell.    Radiology view: 5 mm subpleural nodule right middle lobe image 34 series  17.      Patient to see Dr. Cottrell on 5/24/2023 to review abnormal CT scans.   Will message Dr. Cottrell to review pre-OV.

## 2023-05-24 ENCOUNTER — OFFICE VISIT (OUTPATIENT)
Dept: CARDIOLOGY | Facility: CLINIC | Age: 38
End: 2023-05-24
Attending: INTERNAL MEDICINE
Payer: COMMERCIAL

## 2023-05-24 VITALS
DIASTOLIC BLOOD PRESSURE: 80 MMHG | SYSTOLIC BLOOD PRESSURE: 114 MMHG | HEIGHT: 70 IN | BODY MASS INDEX: 25.8 KG/M2 | OXYGEN SATURATION: 95 % | HEART RATE: 109 BPM | WEIGHT: 180.2 LBS

## 2023-05-24 DIAGNOSIS — I42.9 CARDIOMYOPATHY, UNSPECIFIED TYPE (H): Primary | ICD-10-CM

## 2023-05-24 DIAGNOSIS — R00.0 TACHYCARDIA: ICD-10-CM

## 2023-05-24 DIAGNOSIS — R55 NEAR SYNCOPE: ICD-10-CM

## 2023-05-24 DIAGNOSIS — I77.810 ASCENDING AORTA DILATATION (H): ICD-10-CM

## 2023-05-24 PROCEDURE — 99215 OFFICE O/P EST HI 40 MIN: CPT | Performed by: INTERNAL MEDICINE

## 2023-05-24 RX ORDER — CARVEDILOL 6.25 MG/1
6.25 TABLET ORAL 2 TIMES DAILY WITH MEALS
Qty: 180 TABLET | Refills: 4 | Status: SHIPPED | OUTPATIENT
Start: 2023-05-24 | End: 2023-07-11
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
## 2023-05-26 ENCOUNTER — TELEPHONE (OUTPATIENT)
Dept: FAMILY MEDICINE | Facility: CLINIC | Age: 38
End: 2023-05-26

## 2023-05-26 NOTE — TELEPHONE ENCOUNTER
[facility] - Moon Palencia NP  Not in clinic on Weds: [phone] direct line  RN Johana at clinic: [phone]    Moon is calling to have discussion with Dr. Mitchell about Ricardo's care at TBI clinic. She states the care team is struggling because pt is asking for significant amounts of disability paperwork but having trouble because his diagnosis is not confirmed at this point - based on her assessment Ricardo does not meet diagnostic criteria for TBI. Moon states that based on pt's presentation his care team at  does not feel that he has had a TBI. Came to  with diagnosis from outside providers. Has not responded much to what  has been able to offer up to this point.    Saw  neurology for r/o of functional neurologic disorders to no success.     Requesting consult with Dr. Mitchell for updates to pt's plan of care. Will forward to Dr. Mitchell for her review and follow-up.    Edouard MAN, RN  05/26/23 8:31 AM

## 2023-07-03 ENCOUNTER — HOSPITAL ENCOUNTER (OUTPATIENT)
Dept: CARDIOLOGY | Facility: CLINIC | Age: 38
Discharge: HOME | End: 2023-07-03
Attending: INTERNAL MEDICINE | Admitting: INTERNAL MEDICINE
Payer: COMMERCIAL

## 2023-07-03 ENCOUNTER — LAB (OUTPATIENT)
Dept: LAB | Facility: CLINIC | Age: 38
End: 2023-07-03
Payer: COMMERCIAL

## 2023-07-03 DIAGNOSIS — I42.9 CARDIOMYOPATHY, UNSPECIFIED TYPE (H): ICD-10-CM

## 2023-07-03 LAB — TROPONIN T SERPL HS-MCNC: <6 NG/L

## 2023-07-03 PROCEDURE — 75561 CARDIAC MRI FOR MORPH W/DYE: CPT

## 2023-07-03 PROCEDURE — A9585 GADOBUTROL INJECTION: HCPCS | Performed by: INTERNAL MEDICINE

## 2023-07-03 PROCEDURE — 75561 CARDIAC MRI FOR MORPH W/DYE: CPT | Mod: 26 | Performed by: INTERNAL MEDICINE

## 2023-07-03 PROCEDURE — 36415 COLL VENOUS BLD VENIPUNCTURE: CPT | Performed by: INTERNAL MEDICINE

## 2023-07-03 PROCEDURE — 84484 ASSAY OF TROPONIN QUANT: CPT | Performed by: INTERNAL MEDICINE

## 2023-07-03 PROCEDURE — 255N000002 HC RX 255 OP 636: Performed by: INTERNAL MEDICINE

## 2023-07-03 RX ORDER — ONDANSETRON 2 MG/ML
4 INJECTION INTRAMUSCULAR; INTRAVENOUS
Status: DISCONTINUED | OUTPATIENT
Start: 2023-07-03 | End: 2023-07-04 | Stop reason: HOSPADM

## 2023-07-03 RX ORDER — CAFFEINE CITRATE 20 MG/ML
60 SOLUTION INTRAVENOUS
Status: DISCONTINUED | OUTPATIENT
Start: 2023-07-03 | End: 2023-07-04 | Stop reason: HOSPADM

## 2023-07-03 RX ORDER — AMINOPHYLLINE 25 MG/ML
100 INJECTION, SOLUTION INTRAVENOUS ONCE
Status: DISCONTINUED | OUTPATIENT
Start: 2023-07-03 | End: 2023-07-04 | Stop reason: HOSPADM

## 2023-07-03 RX ORDER — GADOBUTROL 604.72 MG/ML
11 INJECTION INTRAVENOUS ONCE
Status: COMPLETED | OUTPATIENT
Start: 2023-07-03 | End: 2023-07-03

## 2023-07-03 RX ORDER — REGADENOSON 0.08 MG/ML
0.4 INJECTION, SOLUTION INTRAVENOUS ONCE
Status: DISCONTINUED | OUTPATIENT
Start: 2023-07-03 | End: 2023-07-04 | Stop reason: HOSPADM

## 2023-07-03 RX ORDER — ACYCLOVIR 200 MG/1
0-1 CAPSULE ORAL
Status: DISCONTINUED | OUTPATIENT
Start: 2023-07-03 | End: 2023-07-04 | Stop reason: HOSPADM

## 2023-07-03 RX ORDER — DIAZEPAM 5 MG
5 TABLET ORAL EVERY 30 MIN PRN
Status: DISCONTINUED | OUTPATIENT
Start: 2023-07-03 | End: 2023-07-04 | Stop reason: HOSPADM

## 2023-07-03 RX ORDER — ALBUTEROL SULFATE 90 UG/1
2 AEROSOL, METERED RESPIRATORY (INHALATION) EVERY 5 MIN PRN
Status: DISCONTINUED | OUTPATIENT
Start: 2023-07-03 | End: 2023-07-04 | Stop reason: HOSPADM

## 2023-07-03 RX ORDER — DIPHENHYDRAMINE HYDROCHLORIDE 50 MG/ML
25-50 INJECTION INTRAMUSCULAR; INTRAVENOUS
Status: DISCONTINUED | OUTPATIENT
Start: 2023-07-03 | End: 2023-07-04 | Stop reason: HOSPADM

## 2023-07-03 RX ORDER — METHYLPREDNISOLONE SODIUM SUCCINATE 125 MG/2ML
125 INJECTION, POWDER, LYOPHILIZED, FOR SOLUTION INTRAMUSCULAR; INTRAVENOUS
Status: DISCONTINUED | OUTPATIENT
Start: 2023-07-03 | End: 2023-07-04 | Stop reason: HOSPADM

## 2023-07-03 RX ORDER — DIPHENHYDRAMINE HCL 25 MG
25 CAPSULE ORAL
Status: DISCONTINUED | OUTPATIENT
Start: 2023-07-03 | End: 2023-07-04 | Stop reason: HOSPADM

## 2023-07-03 RX ADMIN — GADOBUTROL 11 ML: 604.72 INJECTION INTRAVENOUS at 12:12

## 2023-07-03 NOTE — PROGRESS NOTES
Norm Cedeño MD Gaustad, Kristine; Sarah Galvez  MRI Cardiac Core protocol  T2 star   T2 mapping

## 2023-07-06 ENCOUNTER — TRANSFERRED RECORDS (OUTPATIENT)
Dept: HEALTH INFORMATION MANAGEMENT | Facility: CLINIC | Age: 38
End: 2023-07-06

## 2023-07-11 ENCOUNTER — OFFICE VISIT (OUTPATIENT)
Dept: CARDIOLOGY | Facility: CLINIC | Age: 38
End: 2023-07-11
Attending: INTERNAL MEDICINE
Payer: COMMERCIAL

## 2023-07-11 VITALS
HEIGHT: 70 IN | SYSTOLIC BLOOD PRESSURE: 104 MMHG | BODY MASS INDEX: 16.86 KG/M2 | WEIGHT: 117.8 LBS | DIASTOLIC BLOOD PRESSURE: 72 MMHG | HEART RATE: 61 BPM

## 2023-07-11 DIAGNOSIS — I42.9 CARDIOMYOPATHY, UNSPECIFIED TYPE (H): ICD-10-CM

## 2023-07-11 PROCEDURE — 93000 ELECTROCARDIOGRAM COMPLETE: CPT | Performed by: INTERNAL MEDICINE

## 2023-07-11 PROCEDURE — 99215 OFFICE O/P EST HI 40 MIN: CPT | Performed by: INTERNAL MEDICINE

## 2023-07-11 RX ORDER — DILTIAZEM HYDROCHLORIDE 120 MG/1
120 CAPSULE, EXTENDED RELEASE ORAL DAILY
Qty: 30 CAPSULE | Refills: 11 | Status: SHIPPED | OUTPATIENT
Start: 2023-07-11 | End: 2024-02-07
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
## 2023-07-17 ENCOUNTER — OFFICE VISIT (OUTPATIENT)
Dept: FAMILY MEDICINE | Facility: CLINIC | Age: 38
End: 2023-07-17
Payer: COMMERCIAL

## 2023-07-17 VITALS
RESPIRATION RATE: 18 BRPM | TEMPERATURE: 98.1 F | HEART RATE: 89 BPM | WEIGHT: 178.5 LBS | OXYGEN SATURATION: 95 % | SYSTOLIC BLOOD PRESSURE: 107 MMHG | DIASTOLIC BLOOD PRESSURE: 77 MMHG | BODY MASS INDEX: 25.61 KG/M2

## 2023-07-17 DIAGNOSIS — F43.10 PTSD (POST-TRAUMATIC STRESS DISORDER): Primary | ICD-10-CM

## 2023-07-17 DIAGNOSIS — F07.81 POST CONCUSSIVE SYNDROME: ICD-10-CM

## 2023-07-17 DIAGNOSIS — I42.9 CARDIOMYOPATHY, UNSPECIFIED TYPE (H): ICD-10-CM
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
## 2023-07-17 NOTE — NURSING NOTE
38 year old  Chief Complaint   Patient presents with     Follow Up     Medical cannabis certification.       Blood pressure 107/77, pulse 89, temperature 98.1  F (36.7  C), temperature source Temporal, resp. rate 18, weight 81 kg (178 lb 8 oz), SpO2 95 %. Body mass index is 25.61 kg/m .  Patient Active Problem List   Diagnosis     Strain of neck muscle, initial encounter     Anxiety     History of migraine     Upper back pain     Cervicalgia     Recurrent major depressive disorder, in partial remission (H)     PTSD (post-traumatic stress disorder)     H/O multiple allergies     Psoriasis     WILBER (obstructive sleep apnea)     Nausea and vomiting, intractability of vomiting not specified, unspecified vomiting type     Tremor     Hypertriglyceridemia     Post concussive syndrome     Palpitations     Other chest pain     Cardiomyopathy, unspecified type (H)     Ascending aorta dilatation (H)     Tachycardia     Near syncope       Wt Readings from Last 2 Encounters:   07/17/23 81 kg (178 lb 8 oz)   07/11/23 53.4 kg (117 lb 12.8 oz)     BP Readings from Last 3 Encounters:   07/17/23 107/77   07/11/23 104/72   05/24/23 114/80         Current Outpatient Medications   Medication     adalimumab (HUMIRA *CF*) 40 MG/0.4ML prefilled syringe kit     diltiazem ER (DILT-XR) 120 MG 24 hr capsule     DULoxetine (CYMBALTA) 60 MG capsule     galcanezumab-gnlm (EMGALITY) 120 MG/ML injection     LORazepam (ATIVAN) 1 MG tablet     medical cannabis (Patient's own supply)     multivitamin w/minerals (THERA-VIT-M) tablet     Omega-3 Fatty Acids (FISH OIL) 1200 MG capsule     OnabotulinumtoxinA (BOTOX IJ)     prazosin (MINIPRESS) 2 MG capsule     prazosin (MINIPRESS) 5 MG capsule     promethazine (PHENERGAN) 25 MG suppository     rizatriptan (MAXALT) 5 MG tablet     study - emtricitabine-tenofovir 200-300, IDS#[id number], (TRUVADA) per tablet     Vitamin D, Cholecalciferol, 25 MCG (1000 UT) CAPS     No current facility-administered medications for  this visit.       Social History     Tobacco Use     Smoking status: Former     Packs/day: 0.20     Years: 3.00     Pack years: 0.60     Types: Cigarettes     Quit date:      Years since quittin.5     Smokeless tobacco: Never   Vaping Use     Vaping Use: Never used   Substance Use Topics     Alcohol use: Not Currently     Drug use: Yes     Types: Marijuana       Health Maintenance Due   Topic Date Due     Pneumococcal Vaccine: Pediatrics (0 to 5 Years) and At-Risk Patients (6 to 64 Years) (1 - PCV) 1991     COVID-19 Vaccine (3 - Booster for Joel series) 2022       No results found for: PAP      2023 3:56 PM

## 2023-08-09 ENCOUNTER — MYC MEDICAL ADVICE (OUTPATIENT)
Dept: FAMILY MEDICINE | Facility: CLINIC | Age: 38
End: 2023-08-09

## 2023-08-10 ENCOUNTER — VIRTUAL VISIT (OUTPATIENT)
Dept: FAMILY MEDICINE | Facility: CLINIC | Age: 38
End: 2023-08-10
Payer: COMMERCIAL

## 2023-08-10 DIAGNOSIS — U07.1 INFECTION DUE TO 2019 NOVEL CORONAVIRUS: Primary | ICD-10-CM

## 2023-08-10 RX ORDER — NEOMYCIN SULFATE, POLYMYXIN B SULFATE, HYDROCORTISONE 3.5; 10000; 1 MG/ML; [USP'U]/ML; MG/ML
3 SOLUTION/ DROPS AURICULAR (OTIC) DAILY PRN
COMMUNITY
Start: 2023-08-05 | End: 2023-08-12

## 2023-08-10 RX ORDER — OXCARBAZEPINE 150 MG/1
150 TABLET, FILM COATED ORAL
COMMUNITY
Start: 2023-06-05

## 2023-08-10 RX ORDER — CARVEDILOL 6.25 MG/1
6.25 TABLET ORAL
COMMUNITY
Start: 2023-05-24 | End: 2023-08-16 | Stop reason: ALTCHOICE

## 2023-08-10 RX ORDER — METHYLPHENIDATE HYDROCHLORIDE 18 MG/1
1 TABLET, EXTENDED RELEASE ORAL
COMMUNITY
Start: 2023-05-12 | End: 2023-11-22

## 2023-08-10 NOTE — PROGRESS NOTES
Ricardo is a 38 year old who is being evaluated via a billable video visit.      How would you like to obtain your AVS? MyChart  If the video visit is dropped, the invitation should be resent by: Text to cell phone: [phone]  Will anyone else be joining your video visit? No          Assessment & Plan   Problem List Items Addressed This Visit    None  Visit Diagnoses       Infection due to 2019 novel coronavirus    -  Primary    Relevant Medications    nirmatrelvir and ritonavir (PAXLOVID) 300 mg/100 mg therapy pack             23 minutes spent on the date of the encounter doing chart review, history and exam, documentation and further activities as noted.     MD YURIY Rae [facility]    Subjective   Ricardo is a 38 year old, presenting for the following health issues:  COVID Treatment      HPI     COVID-19 Symptom Review  How many days ago did these symptoms start? 1 day    Are any of the following symptoms significant for you?  New or worsening difficulty breathing? No  Worsening cough? Yes, I am coughing up mucus.  Fever or chills? Yes, the highest temperature was 101  Headache: YES  Sore throat: YES  Chest pain: No  Diarrhea: No  Body aches? No    What treatments has patient tried? Guaifenesin (mucinex), Acetaminophen, Nonsteroidals, Antihistamines , and Decongestant - oral   Does patient live in a nursing home, group home, or shelter? No  Does patient have a way to get food/medications during quarantined? Yes, I have a friend or family member who can help me.        Review of Systems   Constitutional, HEENT, cardiovascular, pulmonary, gi and gu systems are negative, except as otherwise noted.      Objective           Vitals:  No vitals were obtained today due to virtual visit.    Physical Exam   GENERAL: Lying in bed, fatigued but engaged  EYES: Eyes grossly normal to inspection.  No discharge or erythema, or obvious scleral/conjunctival abnormalities.  RESP: No audible wheeze, cough, or  visible cyanosis.  No visible retractions or increased work of breathing.    SKIN: Visible skin clear. No significant rash, abnormal pigmentation or lesions.  NEURO: Cranial nerves grossly intact.  Mentation and speech appropriate for age.  PSYCH: Mentation appears normal, affect normal/bright, judgement and insight intact, normal speech and appearance well-groomed.          Video-Visit Details    Type of service:  Video Visit   Video Start Time: 2:41 PM  Video End Time: 2:26 PM    Originating Location (pt. Location): Home    Distant Location (provider location):  On-site  Platform used for Video Visit: Ale

## 2023-08-10 NOTE — TELEPHONE ENCOUNTER
Covid Treatment Medication Review    Diltiazem: Adjust dose or hold for 8 days.    Last BMP on 4/05/2023 renal function WNL    Please send to [facility] in [address] [facility]      RN COVID TREATMENT VISIT  08/10/23      The patient has been triaged and does not require a higher level of care.    Juarez Jauregui  38 year old  Current weight? 175 lb    Has the patient been seen by a primary care provider at an [facility] or [facility] within the past two years? Yes.   Have you been in close proximity to/do you have a known exposure to a person with a confirmed case of influenza? No.     General treatment eligibility:  Date of positive COVID test (PCR or at home)?  8/9/2023    Are you or have you been hospitalized for this COVID-19 infection? No.   Have you received monoclonal antibodies or antiviral treatment for COVID-19 since this positive test? No.   Do you have any of the following conditions that place you at risk of being very sick from COVID-19?   Yes, patient has at least one high risk condition as noted above.     Current COVID symptoms:   - fever or chills  - fatigue  - headache  - nausea or vomiting  Yes. Patient has at least one symptom as selected.     How many days since symptoms started? 5 days or less. Established patient, 12 years or older weighing at least 88.2 lbs, who has symptoms that started in the past 5 days, has not been hospitalized nor received treatment already, and is at risk for being very sick from COVID-19.     Treatment eligibility by RN:  Are you currently pregnant or nursing? No  Do you have a clinically significant hypersensitivity to nirmatrelvir or ritonavir, or toxic epidermal necrolysis (TEN) or Sin-Saul Syndrome? No  Do you have a history of hepatitis, any hepatic impairment on the Problem List (such as Child-Braden Class C, cirrhosis, fatty liver disease, alcoholic liver disease), or was the last liver lab (hepatic panel, ALT, AST, ALK Phos,  bilirubin) elevated in the past 6 months? No  Do you have any history of severe renal impairment (eGFR < 30mL/min)? No    Is patient eligible to continue? Yes, patient meets all eligibility requirements for the RN COVID treatment (as denoted by all no responses above).     Current Outpatient Medications   Medication Sig Dispense Refill    adalimumab (HUMIRA *CF*) 40 MG/0.4ML prefilled syringe kit Inject 0.4 mLs (40 mg) Subcutaneous every 14 days RX from Dermatologist at [facility]-psoriasis      diltiazem ER (DILT-XR) 120 MG 24 hr capsule Take 1 capsule (120 mg) by mouth daily 30 capsule 11    DULoxetine (CYMBALTA) 60 MG capsule 120 mg At Bedtime  90 capsule 0    galcanezumab-gnlm (EMGALITY) 120 MG/ML injection Inject 120 mg Subcutaneous      LORazepam (ATIVAN) 1 MG tablet Take 1 tablet (1 mg) by mouth every 8 hours as needed for anxiety 20 tablet 0    medical cannabis (Patient's own supply) (The purpose of this order is to document that the patient reports taking medical cannabis.  This is not a prescription, and is not used to certify that the patient has a qualifying medical condition.),   Capsules and vaping once daily 0 Information only 0    multivitamin w/minerals (THERA-VIT-M) tablet Take 1 tablet by mouth daily      Omega-3 Fatty Acids (FISH OIL) 1200 MG capsule Take 1,200 mg by mouth At Bedtime       OnabotulinumtoxinA (BOTOX IJ) Inject as directed daily as needed      prazosin (MINIPRESS) 2 MG capsule Take one along with 5mg dose at bedtime. rx from psychiatrist      prazosin (MINIPRESS) 5 MG capsule Take 1 at bedtime, along with 2mg dose. Rx from psychiatrist 90 capsule 0    promethazine (PHENERGAN) 25 MG suppository Place 1 suppository (25 mg) rectally every 6 hours as needed for nausea      rizatriptan (MAXALT) 5 MG tablet at onset of headache      study - emtricitabine-tenofovir 200-300, IDS#[id number], (TRUVADA) per tablet Take 1 tablet by mouth daily 30 tablet     Vitamin D, Cholecalciferol, 25 MCG (1000 UT)  CAPS Take 2 capsules by mouth daily  90 capsule 3       Medications from List 1 of the standing order (on medications that exclude the use of Paxlovid) that patient is taking: NONE. Is patient taking Diana's Wort? No  Is patient taking Diana's Wort or any meds from List 1? No.   Medications from List 2 of the standing order (on meds that provider needs to adjust) that patient is taking: diltiazem (Cardizem, Tiazac), explained a provider visit is necessary to discuss medication adjustments while taking Paxlovid. Is patient on any of the meds from List 2? Yes. Patient will be scheduled or transferred to a  at the end of this call.     MAGDA Layton, RN  08/10/23, 11:09 AM

## 2023-08-16 ENCOUNTER — MYC MEDICAL ADVICE (OUTPATIENT)
Dept: CARDIOLOGY | Facility: CLINIC | Age: 38
End: 2023-08-16
Payer: COMMERCIAL

## 2023-08-16 DIAGNOSIS — I77.810 ASCENDING AORTA DILATATION (H): Primary | ICD-10-CM

## 2023-08-16 DIAGNOSIS — I42.9 CARDIOMYOPATHY, UNSPECIFIED TYPE (H): ICD-10-CM

## 2023-08-16 NOTE — TELEPHONE ENCOUNTER
My chart message from patient:   Ricardo Jauregui [facility] Team 2  Phone Number: [phone]     Hey I contracted Covid last week and had to reschedule my imagining. I will run out of my heart meds in a few days. Wondering if I could get a refill until I am able to meet with you again?  Thanks.  Juarez    Patient's echo on 8/11/2023 was canceled due to illness.      Reply to patient:    Ricki, Mr. Jauregui,    The diltiazem script has 11 refills, so that should just need a call to refill to your pharmacy    The coreg was discontinued at the July visit.    The echo can be rescheduled in a couple of weeks, as soon as you are feeling well. Please call scheduling at [phone] to set up a new date.    Thank you  Team 2 R.N.s  [phone]

## 2023-08-30 ENCOUNTER — HOSPITAL ENCOUNTER (OUTPATIENT)
Dept: CARDIOLOGY | Facility: CLINIC | Age: 38
Discharge: HOME | End: 2023-08-30
Attending: INTERNAL MEDICINE | Admitting: INTERNAL MEDICINE
Payer: COMMERCIAL

## 2023-08-30 ENCOUNTER — TELEPHONE (OUTPATIENT)
Dept: CARDIOLOGY | Facility: CLINIC | Age: 38
End: 2023-08-30

## 2023-08-30 DIAGNOSIS — I42.9 CARDIOMYOPATHY, UNSPECIFIED TYPE (H): ICD-10-CM

## 2023-08-30 DIAGNOSIS — I77.810 ASCENDING AORTA DILATATION (H): ICD-10-CM

## 2023-08-30 LAB
BI-PLANE LVEF ECHO: NORMAL
LVEF ECHO: NORMAL

## 2023-08-30 PROCEDURE — 93306 TTE W/DOPPLER COMPLETE: CPT | Mod: 26 | Performed by: INTERNAL MEDICINE

## 2023-08-30 PROCEDURE — 93306 TTE W/DOPPLER COMPLETE: CPT

## 2023-08-30 NOTE — TELEPHONE ENCOUNTER
Echo completed as below, ordered by Dr Cottrell at visit 8/11/22 for reassessment of patient's cardiomyopathy of unclear etiology. Pt was started on diltiazem 120mg daily at that time. Test was rescheduled after +Covid on 8/16. Next visit is in October with Dr Cottrell. Routed to provider to review.       Left ventricular systolic function is mild to moderately reduced.  Biplane LVEF is 43%.  Moderate aortic root dilatation. 4.6cm  In comparison to previous study, 5/2023, LVEF slightly improved.  [place]: Dr Mejia

## 2023-08-31 NOTE — TELEPHONE ENCOUNTER
"Reply from Dr. Cottrell:  EF has improved from 30-35  to 43.  We will continue with medical management     Patient to see Dr. Cottrell on 10/25/2023.    0955 left a message for patient of improved EF on his echo and a reminder to see Dr. Cottrell in October. My chart update also sent.    Ricki Mr. Jauregui,  Dr. Cottrell reviewed your echo. He said, \"the EF has improved from 30-35  to 43.  We will continue with medical management.\"    Your next visit is on October 25 @ 1:30 PM in the [place] office with Dr. Cottrell.    Thank you  Team 2 R.N.s  [phone]  "

## 2023-09-04 ENCOUNTER — MYC MEDICAL ADVICE (OUTPATIENT)
Dept: CARDIOLOGY | Facility: CLINIC | Age: 38
End: 2023-09-04
Payer: COMMERCIAL

## 2023-09-05 NOTE — TELEPHONE ENCOUNTER
"Reply from Dr. Cottrell:  Uriel Cottrell MD [place], Joaquina BRADY, RN  Phone Number: [phone]     Prazosin can affect blood pressure.  If he gets lightheaded or dizziness this make contributing.  Although I suspect he is in the land diminishing returns does not go to get much more of a blood pressure affect.  I am okay if he wants to increase it.    Reply to patient:    Ricki Mr. Jauregui,    Dr. Cottrell said, \"Prazosin can affect blood pressure.  If he gets lightheaded or dizziness this may be contributing.   I am okay if he wants to increase it.\"    Thank you  Team 2 R.N.s  [phone]      "

## 2023-09-05 NOTE — TELEPHONE ENCOUNTER
My chart message from patient:  Ricardo Jauregui [facility] Heart Team 2  Phone Number: [phone]     I have been taking 7mg of prazosin for nightmares and wanted to increase the dose but my psychiatrist suggested I ask your opinion since the medication was initially a heart med and he didn t want to interfere with your drugs. Any thoughts would be appreciated.    Ricardo    Per Dr. Cottrell's dictation 7/11/2023:   Ricardo has a cardiomyopathy of unclear etiology.  His MRI now demonstrates an ejection fraction of 48.6%.  Unclear if this is an apples to orange comparison, is is ejection fraction better with a more controlled heart rate due to the carvedilol or is it just serendipity and his EF is improving.  Given his intolerance to beta-blockers and apparent improvement in his cardiomyopathy I will stop his carvedilol and try diltiazem 120 mg daily.  Other considerations would be Bystolic or Corlanor.  I will have him return in 1 month with a repeat echocardiogram.  His heart rate never seemed fast enough to be responsible for a rate related cardiomyopathy.  Again with a low blood pressure we will have to see if he can tolerate diltiazem at 120 mg daily   He has many questions regarding his dilated aortic root and aorta.  We will follow this over time.  He states he may have history of some of his grandparents having aneurysms.    Will message Dr. Cottrell to review

## 2023-10-25 ENCOUNTER — OFFICE VISIT (OUTPATIENT)
Dept: CARDIOLOGY | Facility: CLINIC | Age: 38
End: 2023-10-25
Attending: INTERNAL MEDICINE
Payer: COMMERCIAL

## 2023-10-25 VITALS
HEIGHT: 70 IN | SYSTOLIC BLOOD PRESSURE: 132 MMHG | HEART RATE: 94 BPM | BODY MASS INDEX: 24.92 KG/M2 | DIASTOLIC BLOOD PRESSURE: 86 MMHG | WEIGHT: 174.1 LBS | OXYGEN SATURATION: 95 %

## 2023-10-25 DIAGNOSIS — I42.9 CARDIOMYOPATHY, UNSPECIFIED TYPE (H): Primary | ICD-10-CM

## 2023-10-25 DIAGNOSIS — R00.0 TACHYCARDIA: ICD-10-CM

## 2023-10-25 DIAGNOSIS — R55 NEAR SYNCOPE: ICD-10-CM

## 2023-10-25 DIAGNOSIS — I77.810 ASCENDING AORTA DILATATION (H): ICD-10-CM

## 2023-10-25 PROCEDURE — 99215 OFFICE O/P EST HI 40 MIN: CPT | Performed by: INTERNAL MEDICINE
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
## 2023-10-31 ENCOUNTER — TELEPHONE (OUTPATIENT)
Dept: CARDIOLOGY | Facility: CLINIC | Age: 38
End: 2023-10-31

## 2023-10-31 ENCOUNTER — HOSPITAL ENCOUNTER (OUTPATIENT)
Dept: CARDIOLOGY | Facility: CLINIC | Age: 38
Discharge: HOME | End: 2023-10-31
Attending: INTERNAL MEDICINE | Admitting: INTERNAL MEDICINE
Payer: COMMERCIAL

## 2023-10-31 DIAGNOSIS — I42.9 CARDIOMYOPATHY, UNSPECIFIED TYPE (H): Primary | ICD-10-CM

## 2023-10-31 DIAGNOSIS — I42.9 CARDIOMYOPATHY, UNSPECIFIED TYPE (H): ICD-10-CM

## 2023-10-31 PROCEDURE — 93350 STRESS TTE ONLY: CPT | Mod: 26 | Performed by: INTERNAL MEDICINE

## 2023-10-31 PROCEDURE — 93325 DOPPLER ECHO COLOR FLOW MAPG: CPT | Mod: TC

## 2023-10-31 PROCEDURE — 93325 DOPPLER ECHO COLOR FLOW MAPG: CPT | Mod: 26 | Performed by: INTERNAL MEDICINE

## 2023-10-31 PROCEDURE — 255N000002 HC RX 255 OP 636: Performed by: INTERNAL MEDICINE

## 2023-10-31 PROCEDURE — 93017 CV STRESS TEST TRACING ONLY: CPT

## 2023-10-31 PROCEDURE — 93018 CV STRESS TEST I&R ONLY: CPT | Performed by: INTERNAL MEDICINE

## 2023-10-31 PROCEDURE — 93321 DOPPLER ECHO F-UP/LMTD STD: CPT | Mod: 26 | Performed by: INTERNAL MEDICINE

## 2023-10-31 PROCEDURE — 93016 CV STRESS TEST SUPVJ ONLY: CPT | Performed by: INTERNAL MEDICINE

## 2023-10-31 RX ADMIN — HUMAN ALBUMIN MICROSPHERES AND PERFLUTREN 9 ML: 10; .22 INJECTION, SOLUTION INTRAVENOUS at 14:39

## 2023-10-31 NOTE — TELEPHONE ENCOUNTER
Stress echo 10-31-23   non-diagnostic stress echocardiogram due to the inability to achieve target heart rate.  Target Heart Rate was not achieved due to dyspnea.     Rest echocardiogram demonstrates mild-moderately reduced systolic function.  The visual ejection fraction is 40-45%.  Left ventricular cavity size does not change with exercise.  Global LV systolic function does not change with exercise. The visual ejection  fraction is estimated at 40-45%.  Rest ECG normal sinus rhythm.  Stress ECG non-diagnostic due to the inability to achieve target heart rate.  No ischemic changes at below-target HR.  _____________________________________    Stress  The patient exercised 7:59.  RPP 19,650.  Exercise was stopped due to dyspnea.  There was a normal BP response to exercise.  The patient exhibited dyspnea during exercise.  Patient stated, he was becoming very lightheaded and experiencing extreme SOB.  This was non-diagnostic stress EKG due to the inability to achieve target  heart rate.  Target Heart Rate was not achieved due to dyspnea.      Next Dr. Cottrell OV 11-22-23

## 2023-11-01 NOTE — TELEPHONE ENCOUNTER
Reply from Dr. Cottrell:   I would like to check an N-terminal proBNP.       Spoke with patient to review inconclusive stress echo and Dr. Cottrell's recommendation to check a BNP.   Order placed for BNP  Message to scheduling to contact the patient.

## 2023-11-21 ENCOUNTER — LAB (OUTPATIENT)
Dept: LAB | Facility: CLINIC | Age: 38
End: 2023-11-21
Payer: COMMERCIAL

## 2023-11-21 DIAGNOSIS — I42.9 CARDIOMYOPATHY, UNSPECIFIED TYPE (H): ICD-10-CM

## 2023-11-21 LAB — NT-PROBNP SERPL-MCNC: <36 PG/ML (ref 0–450)

## 2023-11-21 PROCEDURE — 36415 COLL VENOUS BLD VENIPUNCTURE: CPT | Performed by: INTERNAL MEDICINE

## 2023-11-21 PROCEDURE — 83880 ASSAY OF NATRIURETIC PEPTIDE: CPT | Performed by: INTERNAL MEDICINE

## 2023-11-22 ENCOUNTER — OFFICE VISIT (OUTPATIENT)
Dept: CARDIOLOGY | Facility: CLINIC | Age: 38
End: 2023-11-22
Payer: COMMERCIAL

## 2023-11-22 ENCOUNTER — HOSPITAL ENCOUNTER (OUTPATIENT)
Dept: CARDIOLOGY | Facility: CLINIC | Age: 38
Discharge: HOME | End: 2023-11-22
Attending: INTERNAL MEDICINE | Admitting: INTERNAL MEDICINE
Payer: COMMERCIAL

## 2023-11-22 VITALS
WEIGHT: 177.9 LBS | HEART RATE: 116 BPM | SYSTOLIC BLOOD PRESSURE: 121 MMHG | BODY MASS INDEX: 25.47 KG/M2 | HEIGHT: 70 IN | OXYGEN SATURATION: 95 % | DIASTOLIC BLOOD PRESSURE: 85 MMHG

## 2023-11-22 DIAGNOSIS — I77.810 ASCENDING AORTA DILATATION (H): ICD-10-CM

## 2023-11-22 DIAGNOSIS — R00.0 TACHYCARDIA: ICD-10-CM

## 2023-11-22 DIAGNOSIS — R00.0 TACHYCARDIA: Primary | ICD-10-CM

## 2023-11-22 DIAGNOSIS — I42.9 CARDIOMYOPATHY, UNSPECIFIED TYPE (H): ICD-10-CM

## 2023-11-22 DIAGNOSIS — R55 NEAR SYNCOPE: ICD-10-CM

## 2023-11-22 PROCEDURE — 93248 EXT ECG>7D<15D REV&INTERPJ: CPT | Performed by: INTERNAL MEDICINE

## 2023-11-22 PROCEDURE — 99215 OFFICE O/P EST HI 40 MIN: CPT | Performed by: INTERNAL MEDICINE

## 2023-11-22 PROCEDURE — 93246 EXT ECG>7D<15D RECORDING: CPT

## 2023-11-22 PROCEDURE — 93000 ELECTROCARDIOGRAM COMPLETE: CPT | Performed by: INTERNAL MEDICINE
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
## 2023-12-13 ENCOUNTER — TELEPHONE (OUTPATIENT)
Dept: FAMILY MEDICINE | Facility: CLINIC | Age: 38
End: 2023-12-13

## 2023-12-13 ENCOUNTER — NURSE TRIAGE (OUTPATIENT)
Dept: NURSING | Facility: CLINIC | Age: 38
End: 2023-12-13
Payer: COMMERCIAL

## 2023-12-13 DIAGNOSIS — R11.0 NAUSEA: Primary | ICD-10-CM

## 2023-12-13 DIAGNOSIS — F41.9 ANXIETY: ICD-10-CM

## 2023-12-13 DIAGNOSIS — F07.81 POST CONCUSSIVE SYNDROME: Primary | ICD-10-CM

## 2023-12-13 RX ORDER — PROMETHAZINE HYDROCHLORIDE 25 MG/1
25 SUPPOSITORY RECTAL EVERY 6 HOURS PRN
Qty: 30 SUPPOSITORY | Refills: 1 | Status: SHIPPED | OUTPATIENT
Start: 2023-12-13 | End: 2024-03-23

## 2023-12-13 RX ORDER — PROMETHAZINE HYDROCHLORIDE 25 MG/1
25 SUPPOSITORY RECTAL EVERY 6 HOURS PRN
Qty: 30 SUPPOSITORY | Refills: 1 | Status: SHIPPED | OUTPATIENT
Start: 2023-12-13 | End: 2023-12-13

## 2023-12-13 RX ORDER — FAMOTIDINE 20 MG
2 TABLET ORAL DAILY
Qty: 90 CAPSULE | Refills: 3 | Status: SHIPPED | OUTPATIENT
Start: 2023-12-13

## 2023-12-13 RX ORDER — MULTIPLE VITAMINS W/ MINERALS TAB 9MG-400MCG
1 TAB ORAL DAILY
Qty: 90 TABLET | Refills: 3 | Status: SHIPPED | OUTPATIENT
Start: 2023-12-13 | End: 2023-12-14

## 2023-12-13 RX ORDER — LORAZEPAM 1 MG/1
1 TABLET ORAL EVERY 8 HOURS PRN
Qty: 20 TABLET | Refills: 0 | OUTPATIENT
Start: 2023-12-13

## 2023-12-13 RX ORDER — AMOXICILLIN 500 MG
1200 CAPSULE ORAL AT BEDTIME
Qty: 90 CAPSULE | Refills: 3 | Status: SHIPPED | OUTPATIENT
Start: 2023-12-13

## 2023-12-13 NOTE — TELEPHONE ENCOUNTER
JEOVANY Multani calling from [facility].    Pt needs refills on 2 medications that PCP Dr. Mitchell has ordered:  Lorazepam   Promethazine suppository to Pharmacy [facility] in [place] on file.    In addition, please fax the diagnosis list - fax [phone] to corrie Multani RN     Please advise Nerissa at [phone].    Thank you  Jahaira Lucio RN  [facility] Nurse Advisor     Reason for Disposition   Caller requesting a CONTROLLED substance prescription refill (e.g., narcotics, ADHD medicines)    Additional Information   Negative: New-onset or worsening symptoms, see that guideline (e.g., diarrhea, runny nose, sore throat)   Negative: Medicine question not related to refill or renewal   Negative: Caller (e.g., patient or pharmacist) requesting information about a new medicine   Negative: Caller requesting information unrelated to medicine   Negative: [1] Prescription refill request for ESSENTIAL medicine (i.e., likelihood of harm to patient if not taken) AND [2] triager unable to refill per department policy   Negative: [1] Prescription not at pharmacy AND [2] was prescribed by PCP recently  (Exception: Triager has access to EMR and prescription is recorded there. Go to Home Care and confirm for pharmacy.)   Negative: [1] Pharmacy calling with prescription questions AND [2] triager unable to answer question   Negative: Prescription request for new medicine (not a refill)    Protocols used: Medication Refill and Renewal Call-A-

## 2023-12-13 NOTE — TELEPHONE ENCOUNTER
Received call from Jolanta at [facility] in [place] requesting we fax pt's medication list to them at [phone]. She also requested we fax the medication list to [facility] (fax [phone]) in [place]. Faxed medication list to both locations. Updated Dr. Mitchell on pt's location.    MAGDA Layton, RN  12/13/23, 12:41 PM

## 2023-12-13 NOTE — TELEPHONE ENCOUNTER
Called Jolanta back at [phone], from [facility],  to let her know she will need to contact Juarez's psychiatry team through [facility] for prescriptions for his psych medications as we do not manage those.  I can send prescriptions for the MVI, Vit D and Fish oil.  JAYSON HaddadN, RN, CCM  RN Care Coordinator  [facility]  12/13/23  2:35 PM  Phone: [phone]

## 2023-12-14 ENCOUNTER — TELEPHONE (OUTPATIENT)
Dept: FAMILY MEDICINE | Facility: CLINIC | Age: 38
End: 2023-12-14

## 2023-12-14 DIAGNOSIS — F07.81 POST CONCUSSIVE SYNDROME: ICD-10-CM

## 2023-12-14 RX ORDER — MULTIPLE VITAMINS W/ MINERALS TAB 9MG-400MCG
1 TAB ORAL DAILY
Qty: 90 TABLET | Refills: 3 | Status: SHIPPED | OUTPATIENT
Start: 2023-12-14

## 2023-12-14 NOTE — TELEPHONE ENCOUNTER
Call from [facility] at [facility].  [facility] pharmacy claims to not have received multiple vitamin order.  Rx resent.  Asked [facility] to please stop calling for the other prescriptions that we do not manage.  I told her that I have provided her the phone numbers of the other providers she is to be calling and she said well, these medications are on the med list you sent me.  I explained that's because that medication list is from his general EPIC chart.    JAYSON HaddadN, RN, [facility]  RN Care Coordinator  [facility]  12/14/23  2:17 PM  Phone: [phone]

## 2023-12-14 NOTE — TELEPHONE ENCOUNTER
Rx for lorazepam not filled, it was removed from the med list as it had not been prescribed since 2019. Would ask psychiatry for this rx if needed.    Ok to fill the promethazine for nausea if needed. I faxed this rx to pharmacy.      Please notify Nerissa THAYER of these changes.    Danielle Mitchell MD  Internal Medicine/Pediatrics

## 2023-12-29 ENCOUNTER — TELEPHONE (OUTPATIENT)
Dept: CARDIOLOGY | Facility: CLINIC | Age: 38
End: 2023-12-29

## 2023-12-29 ENCOUNTER — OFFICE VISIT (OUTPATIENT)
Dept: CARDIOLOGY | Facility: CLINIC | Age: 38
End: 2023-12-29
Payer: COMMERCIAL

## 2023-12-29 VITALS
BODY MASS INDEX: 25.34 KG/M2 | OXYGEN SATURATION: 100 % | HEART RATE: 116 BPM | HEIGHT: 70 IN | DIASTOLIC BLOOD PRESSURE: 86 MMHG | WEIGHT: 177 LBS | SYSTOLIC BLOOD PRESSURE: 118 MMHG | RESPIRATION RATE: 17 BRPM

## 2023-12-29 DIAGNOSIS — I42.9 CARDIOMYOPATHY, UNSPECIFIED TYPE (H): ICD-10-CM

## 2023-12-29 DIAGNOSIS — R55 NEAR SYNCOPE: ICD-10-CM

## 2023-12-29 DIAGNOSIS — I77.810 ASCENDING AORTA DILATATION (H): Primary | ICD-10-CM

## 2023-12-29 DIAGNOSIS — R00.0 SINUS TACHYCARDIA: ICD-10-CM

## 2023-12-29 PROCEDURE — 99214 OFFICE O/P EST MOD 30 MIN: CPT | Performed by: INTERNAL MEDICINE

## 2023-12-29 RX ORDER — METOPROLOL SUCCINATE 25 MG/1
12.5 TABLET, EXTENDED RELEASE ORAL DAILY
Qty: 45 TABLET | Refills: 4 | Status: SHIPPED | OUTPATIENT
Start: 2023-12-29 | End: 2024-02-07

## 2023-12-29 NOTE — TELEPHONE ENCOUNTER
Spoke with Patient who states he did not call and has no further questions.     Last Dr. Cottrell OV 12-29-23
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
## 2023-12-29 NOTE — TELEPHONE ENCOUNTER
Health Call Center    Phone Message    May a detailed message be left on voicemail: yes     Reason for Call: Medication Question or concern regarding medication   Prescription Clarification  Name of Medication: metoprolol succinate ER (TOPROL XL) 25 MG 24 hr tablet, diltiazem ER (DILT-XR) 120 MG 24 hr capsule   Prescribing Provider: Simba   Pharmacy: [facility]. [facility] [address]    What on the order needs clarification? Pharmacy wants to confirm that the new prescription of metoprolol succinate is not a replacement for the diltiazam.      Action Taken: Other: Cardiology    Travel Screening: Not Applicable    Thank you!  [facility]
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
## 2023-12-31 ENCOUNTER — MYC MEDICAL ADVICE (OUTPATIENT)
Dept: CARDIOLOGY | Facility: CLINIC | Age: 38
End: 2023-12-31
Payer: COMMERCIAL

## 2024-01-02 NOTE — TELEPHONE ENCOUNTER
My Chart message 12-31-23  - Lab draw request -     Last Dr. Cottrell OV 12-29-23   Cardiology Problems  Cardiomyopathy, unspecified type (H)  Ascending aorta dilatation (H24)  Sinus tachycardia  Near syncope  Other chest pain  Palpitations  Hypertriglyceridemia      PCP is Dr. Danielle Mitchell.

## 2024-01-02 NOTE — TELEPHONE ENCOUNTER
My Chart message sent to Patient.     Dr. Cottrell's reply - I would defer to neurology or endocrinology or primary

## 2024-01-15 ENCOUNTER — OFFICE VISIT (OUTPATIENT)
Dept: FAMILY MEDICINE | Facility: CLINIC | Age: 39
End: 2024-01-15
Payer: COMMERCIAL

## 2024-01-15 VITALS
OXYGEN SATURATION: 97 % | TEMPERATURE: 97.8 F | WEIGHT: 174 LBS | SYSTOLIC BLOOD PRESSURE: 118 MMHG | DIASTOLIC BLOOD PRESSURE: 89 MMHG | HEIGHT: 70 IN | HEART RATE: 89 BPM | BODY MASS INDEX: 24.91 KG/M2 | RESPIRATION RATE: 15 BRPM

## 2024-01-15 DIAGNOSIS — R11.10 VOMITING, UNSPECIFIED VOMITING TYPE, UNSPECIFIED WHETHER NAUSEA PRESENT: ICD-10-CM

## 2024-01-15 DIAGNOSIS — R00.0 TACHYCARDIA: ICD-10-CM

## 2024-01-15 DIAGNOSIS — I42.9 CARDIOMYOPATHY, UNSPECIFIED TYPE (H): Primary | ICD-10-CM

## 2024-01-15 DIAGNOSIS — R53.83 OTHER FATIGUE: ICD-10-CM

## 2024-01-15 LAB
ANION GAP SERPL CALCULATED.3IONS-SCNC: 13 MMOL/L (ref 7–15)
BUN SERPL-MCNC: 14.1 MG/DL (ref 6–20)
CALCIUM SERPL-MCNC: 9.6 MG/DL (ref 8.6–10)
CHLORIDE SERPL-SCNC: 103 MMOL/L (ref 98–107)
CREAT SERPL-MCNC: 1.08 MG/DL (ref 0.67–1.17)
DEPRECATED HCO3 PLAS-SCNC: 25 MMOL/L (ref 22–29)
EGFRCR SERPLBLD CKD-EPI 2021: 90 ML/MIN/1.73M2
GLUCOSE SERPL-MCNC: 74 MG/DL (ref 70–99)
POTASSIUM SERPL-SCNC: 4.2 MMOL/L (ref 3.4–5.3)
SODIUM SERPL-SCNC: 141 MMOL/L (ref 135–145)
TSH SERPL DL<=0.005 MIU/L-ACNC: 1.93 UIU/ML (ref 0.3–4.2)

## 2024-01-15 PROCEDURE — 84443 ASSAY THYROID STIM HORMONE: CPT | Performed by: INTERNAL MEDICINE

## 2024-01-15 PROCEDURE — 80048 BASIC METABOLIC PNL TOTAL CA: CPT | Performed by: INTERNAL MEDICINE

## 2024-01-15 NOTE — NURSING NOTE
"38 year old  Chief Complaint   Patient presents with    RECHECK     Follow up cardiology appointments and results.     Phq declined.        Blood pressure 118/89, pulse 89, temperature 97.8  F (36.6  C), temperature source Skin, resp. rate 15, height 1.778 m (5' 10\"), weight 78.9 kg (174 lb), SpO2 97%. Body mass index is 24.97 kg/m .  Patient Active Problem List   Diagnosis    Strain of neck muscle, initial encounter    Anxiety    History of migraine    Upper back pain    Cervicalgia    Recurrent major depressive disorder, in partial remission (H24)    PTSD (post-traumatic stress disorder)    H/O multiple allergies    Psoriasis    WILBER (obstructive sleep apnea)    Nausea and vomiting, intractability of vomiting not specified, unspecified vomiting type    Tremor    Hypertriglyceridemia    Post concussive syndrome    Palpitations    Other chest pain    Cardiomyopathy, unspecified type (H)    Ascending aorta dilatation (H24)    Sinus tachycardia    Near syncope       Wt Readings from Last 2 Encounters:   01/15/24 78.9 kg (174 lb)   12/29/23 80.3 kg (177 lb)     BP Readings from Last 3 Encounters:   01/15/24 118/89   12/29/23 118/86   11/22/23 121/85         Current Outpatient Medications   Medication    diltiazem ER (DILT-XR) 120 MG 24 hr capsule    DULoxetine (CYMBALTA) 60 MG capsule    galcanezumab-gnlm (EMGALITY) 120 MG/ML injection    medical cannabis (Patient's own supply)    metoprolol succinate ER (TOPROL XL) 25 MG 24 hr tablet    multivitamin w/minerals (THERA-VIT-M) tablet    Omega-3 Fatty Acids (FISH OIL) 1200 MG capsule    OXcarbazepine (TRILEPTAL) 150 MG tablet    prazosin (MINIPRESS) 2 MG capsule    prazosin (MINIPRESS) 5 MG capsule    promethazine (PHENERGAN) 25 MG suppository    Risankizumab-rzaa (SKYRIZI) 150 MG/ML subcutaneous    rizatriptan (MAXALT) 5 MG tablet    study - emtricitabine-tenofovir 200-300, IDS#[id number], (TRUVADA) per tablet    Vitamin D, Cholecalciferol, 25 MCG (1000 UT) CAPS     No current " "facility-administered medications for this visit.       Social History     Tobacco Use    Smoking status: Former     Packs/day: 0.20     Years: 3.00     Additional pack years: 0.00     Total pack years: 0.60     Types: Cigarettes     Quit date:      Years since quittin.0    Smokeless tobacco: Never   Vaping Use    Vaping Use: Never used   Substance Use Topics    Alcohol use: Not Currently    Drug use: Yes     Types: Marijuana       Health Maintenance Due   Topic Date Due    Pneumococcal Vaccine: Pediatrics (0 to 5 Years) and At-Risk Patients (6 to 64 Years) (1 of 2 - PCV) 1991    PHQ-9  10/05/2023    COVID-19 Vaccine (2023- season) 2024       No results found for: \"PAP\"      January 15, 2024 1:02 PM    "
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
## 2024-02-07 ENCOUNTER — OFFICE VISIT (OUTPATIENT)
Dept: CARDIOLOGY | Facility: CLINIC | Age: 39
End: 2024-02-07
Attending: INTERNAL MEDICINE
Payer: COMMERCIAL

## 2024-02-07 VITALS
HEART RATE: 88 BPM | OXYGEN SATURATION: 97 % | HEIGHT: 70 IN | WEIGHT: 177 LBS | DIASTOLIC BLOOD PRESSURE: 77 MMHG | SYSTOLIC BLOOD PRESSURE: 108 MMHG | BODY MASS INDEX: 25.34 KG/M2

## 2024-02-07 DIAGNOSIS — R00.0 TACHYCARDIA: ICD-10-CM

## 2024-02-07 DIAGNOSIS — I42.9 CARDIOMYOPATHY, UNSPECIFIED TYPE (H): ICD-10-CM

## 2024-02-07 DIAGNOSIS — R55 NEAR SYNCOPE: ICD-10-CM

## 2024-02-07 DIAGNOSIS — R00.0 SINUS TACHYCARDIA: Primary | ICD-10-CM

## 2024-02-07 DIAGNOSIS — I77.810 ASCENDING AORTA DILATATION (H): ICD-10-CM

## 2024-02-07 PROCEDURE — 99215 OFFICE O/P EST HI 40 MIN: CPT | Performed by: INTERNAL MEDICINE

## 2024-02-07 RX ORDER — METOPROLOL SUCCINATE 25 MG/1
25 TABLET, EXTENDED RELEASE ORAL DAILY
Qty: 45 TABLET | Refills: 4 | Status: SHIPPED | OUTPATIENT
Start: 2024-02-07 | End: 2024-03-12 | Stop reason: ALTCHOICE
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
## 2024-02-08 ENCOUNTER — TELEPHONE (OUTPATIENT)
Dept: CARDIOLOGY | Facility: CLINIC | Age: 39
End: 2024-02-08
Payer: COMMERCIAL

## 2024-02-08 NOTE — TELEPHONE ENCOUNTER
Nicky THAYER from [facility].  , Ph# [phone], Fax # [phone],   Called Patient would like to  mange his cardiac medications pills on his own.  Would needs orders to be sent that Patient can self administer cardiac  medications., TCU will still provide him the medications.     Last Dr. Cottrell OV 2-7-24

## 2024-02-13 ENCOUNTER — TELEPHONE (OUTPATIENT)
Dept: CARDIOLOGY | Facility: CLINIC | Age: 39
End: 2024-02-13
Payer: COMMERCIAL

## 2024-02-13 NOTE — TELEPHONE ENCOUNTER
Order has been received, awaiting provider signature. Spoke to JEOVANY Multani about this, hopefully can get the order sent over today. Fax [phone].

## 2024-02-13 NOTE — TELEPHONE ENCOUNTER
[facility] Call Center    Phone Message    May a detailed message be left on voicemail: yes     Reason for Call: Other: Nerissa called on behalf of the patient requesting to speak with a member of his care team. Patient would like to self administer his medication, but they are requesting an order from  stating it is ok for the patient to do so. Please call back to further discuss.     Action Taken: Message routed to:  Other: Cardiology    Travel Screening: Not Applicable    Thank you!  [facility]

## 2024-02-13 NOTE — TELEPHONE ENCOUNTER
Order form with approval for patient to self-administer his medications faxed to Nicky THAYER, [facility] @ [phone].

## 2024-02-15 NOTE — TELEPHONE ENCOUNTER
Magda is calling and is still hasn't received the fax and magda would like a call back asap, please advise,thank you.  Thank you!  [facility]  JORGE LUIS YEBOAH on 2/15/2024 at 10:14 AM \

## 2024-03-08 ENCOUNTER — MYC MEDICAL ADVICE (OUTPATIENT)
Dept: CARDIOLOGY | Facility: CLINIC | Age: 39
End: 2024-03-08
Payer: COMMERCIAL

## 2024-03-08 DIAGNOSIS — R00.2 PALPITATIONS: ICD-10-CM

## 2024-03-08 DIAGNOSIS — I42.9 CARDIOMYOPATHY, UNSPECIFIED TYPE (H): Primary | ICD-10-CM

## 2024-03-08 NOTE — TELEPHONE ENCOUNTER
My Chart for review - also see attachment     Dr. Schilling OV 4-22-24     Last Dr. Cottrell OV 2-7-24.   As he appears to be tolerating the metoprolol and this drug makes more sense for cardiomyopathy and probably catecholamine driven sinus tachycardia I will increase his metoprolol to 25 mg daily and stop his diltiazem to avoid any hypotension.    -  I will refer  to Dr. Charlie John for inappropriate sinus tachycardia.  To see if he has any ideas or recommendations.

## 2024-03-11 NOTE — TELEPHONE ENCOUNTER
My Chart message sent to Patient regarding more information.      Dr. Cottrell's message reply -     Does Have an arrhythmia monitor too?  If so did it  any A-fib or a flutter.  Does not have any rhythm strips?  It is possible he was having a bad dream and it is just sinus tachycardia.  If it continues with any regularity we will place a Zio patch.  How is he tolerating metoprolol?  If he is tolerating it well we can consider bumping up to a higher dose.

## 2024-03-11 NOTE — TELEPHONE ENCOUNTER
Awaiting reply for Patient regarding more information     Mr. Jauregui,     Dr. Cottrell's reviewed your My Chart message.  Dr. Cottrell would like more information      Do you have a arrhythmia monitor? If so did it  any A Fib or flutter? Any rhythm strips available.?     It is possible you were having a bad dream and it just was sinus tach - fast heart rate.   If it continues we may do another ZIO Patch,      2. How are you tolerating the Metoprolol  and what dose are you on ?     Please reply,     Thank you,  Dr. Cottrell's Nurse Team 2.

## 2024-03-12 RX ORDER — ATENOLOL 25 MG/1
25 TABLET ORAL DAILY
Qty: 90 TABLET | Refills: 1 | Status: SHIPPED | OUTPATIENT
Start: 2024-03-12 | End: 2024-04-22

## 2024-03-12 NOTE — TELEPHONE ENCOUNTER
Called patient to review recent nighttime event. He states with his PTSD he is having a lot of nightmares. He has noticed an increase since starting the metoprolol 25mg daily. Overall his sleep quality is much worse although he sleeps more hours.   He is compliant with the metoprolol dose.  His watch does not records rhythms, just rates.  Patient states the current dose of metoprolol has not changed the palpitations that he feels during the day.    Per Dr. Cottrell's dictation 2/7/2024: Rae felt horrible on the carvedilol and I switched him to diltiazem 120 daily.  He had no energy and felt extremely fatigued.      As he appears to be tolerating the metoprolol and this drug makes more sense for cardiomyopathy and probably catecholamine driven sinus tachycardia I will increase his metoprolol to 25 mg daily and stop his diltiazem to avoid any hypotension.      Will message Dr. Cottrell to review

## 2024-03-12 NOTE — TELEPHONE ENCOUNTER
Reply from Dr. Cottrell:  beta-blockers do cause nightmares.  He had this reaction last time we started him on a beta-blocker.  We can try changing him to atenolol 25 mg once in the morning.  It is probably the least likely to cause nightmares.  It has a shorter half-life and is hydrophilic.  Both qualities that make it less likely to cause nightmares.  Coreg may be a another option as we can give very low doses but his blood pressure is tenuous.     1050  Attempted to contact patient to discuss changing metoprolol to atenolol 25mg daily. Left message for patient to call back to Team 2 R.N.s @ [phone] spoke with patient to review Dr. Cottrell's recommendation to change to atenolol 25mg daily. Rx escripted.   Med list updated.

## 2024-03-20 ENCOUNTER — OFFICE VISIT (OUTPATIENT)
Dept: FAMILY MEDICINE | Facility: CLINIC | Age: 39
End: 2024-03-20
Payer: COMMERCIAL

## 2024-03-20 VITALS
HEART RATE: 83 BPM | BODY MASS INDEX: 25.34 KG/M2 | HEIGHT: 70 IN | RESPIRATION RATE: 15 BRPM | DIASTOLIC BLOOD PRESSURE: 73 MMHG | TEMPERATURE: 97.3 F | SYSTOLIC BLOOD PRESSURE: 104 MMHG | WEIGHT: 177 LBS | OXYGEN SATURATION: 95 %

## 2024-03-20 DIAGNOSIS — F43.10 PTSD (POST-TRAUMATIC STRESS DISORDER): Primary | ICD-10-CM

## 2024-03-20 DIAGNOSIS — F33.41 RECURRENT MAJOR DEPRESSIVE DISORDER, IN PARTIAL REMISSION (H): ICD-10-CM

## 2024-03-20 DIAGNOSIS — G43.711 INTRACTABLE CHRONIC MIGRAINE WITHOUT AURA AND WITH STATUS MIGRAINOSUS: ICD-10-CM

## 2024-03-20 DIAGNOSIS — F07.81 POST CONCUSSIVE SYNDROME: ICD-10-CM

## 2024-03-20 NOTE — PATIENT INSTRUCTIONS
Edouard Thibodeaux, DO - 02/07/2024 2:30 PM CST   [facility]  Botox injection    Call psychiatrist--for summary, medical opinion, disability statement    Call Dr Jordan expert opinion, 3rd party opinion  Regarding summary  Letter or summary of his findings to date
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
## 2024-03-20 NOTE — NURSING NOTE
"38 year old  Chief Complaint   Patient presents with    Forms     Pt need forms filled out for disability.    Phq declined.       Blood pressure 104/73, pulse 83, temperature 97.3  F (36.3  C), temperature source Skin, resp. rate 15, height 1.778 m (5' 10\"), weight 80.3 kg (177 lb), SpO2 95%. Body mass index is 25.4 kg/m .  Patient Active Problem List   Diagnosis    Strain of neck muscle, initial encounter    Anxiety    History of migraine    Upper back pain    Cervicalgia    Recurrent major depressive disorder, in partial remission (H24)    PTSD (post-traumatic stress disorder)    H/O multiple allergies    Psoriasis    WILBER (obstructive sleep apnea)    Nausea and vomiting, intractability of vomiting not specified, unspecified vomiting type    Tremor    Hypertriglyceridemia    Post concussive syndrome    Palpitations    Other chest pain    Cardiomyopathy, unspecified type (H)    Ascending aorta dilatation (H24)    Sinus tachycardia    Near syncope       Wt Readings from Last 2 Encounters:   03/20/24 80.3 kg (177 lb)   02/07/24 80.3 kg (177 lb)     BP Readings from Last 3 Encounters:   03/20/24 104/73   02/07/24 108/77   01/15/24 118/89         Current Outpatient Medications   Medication    atenolol (TENORMIN) 25 MG tablet    DULoxetine (CYMBALTA) 60 MG capsule    galcanezumab-gnlm (EMGALITY) 120 MG/ML injection    medical cannabis (Patient's own supply)    multivitamin w/minerals (THERA-VIT-M) tablet    Omega-3 Fatty Acids (FISH OIL) 1200 MG capsule    OXcarbazepine (TRILEPTAL) 150 MG tablet    prazosin (MINIPRESS) 2 MG capsule    prazosin (MINIPRESS) 5 MG capsule    Risankizumab-rzaa (SKYRIZI) 150 MG/ML subcutaneous    rizatriptan (MAXALT) 5 MG tablet    study - emtricitabine-tenofovir 200-300, IDS#[id number], (TRUVADA) per tablet    Vitamin D, Cholecalciferol, 25 MCG (1000 UT) CAPS    promethazine (PHENERGAN) 25 MG suppository     No current facility-administered medications for this visit.       Social History " "    Tobacco Use    Smoking status: Former     Packs/day: 0.20     Years: 3.00     Additional pack years: 0.00     Total pack years: 0.60     Types: Cigarettes     Quit date:      Years since quittin.2    Smokeless tobacco: Never   Vaping Use    Vaping Use: Never used   Substance Use Topics    Alcohol use: Not Currently    Drug use: Yes     Types: Marijuana       Health Maintenance Due   Topic Date Due    Pneumococcal Vaccine: Pediatrics (0 to 5 Years) and At-Risk Patients (6 to 64 Years) (1 of 2 - PCV) 1991    PHQ-9  10/05/2023    DEPRESSION 12 MO INDEX REPEAT PHQ-9  2024    YEARLY PREVENTIVE VISIT  2024    LIPID  2024       No results found for: \"PAP\"      2024 11:20 AM    "

## 2024-03-23 PROBLEM — G43.711 INTRACTABLE CHRONIC MIGRAINE WITHOUT AURA AND WITH STATUS MIGRAINOSUS: Status: ACTIVE | Noted: 2024-03-23

## 2024-04-22 ENCOUNTER — OFFICE VISIT (OUTPATIENT)
Dept: CARDIOLOGY | Facility: CLINIC | Age: 39
End: 2024-04-22
Attending: INTERNAL MEDICINE
Payer: COMMERCIAL

## 2024-04-22 ENCOUNTER — TELEPHONE (OUTPATIENT)
Dept: CARDIOLOGY | Facility: CLINIC | Age: 39
End: 2024-04-22

## 2024-04-22 VITALS
HEIGHT: 70 IN | DIASTOLIC BLOOD PRESSURE: 78 MMHG | HEART RATE: 84 BPM | SYSTOLIC BLOOD PRESSURE: 110 MMHG | BODY MASS INDEX: 26.05 KG/M2 | OXYGEN SATURATION: 96 % | WEIGHT: 182 LBS

## 2024-04-22 DIAGNOSIS — R00.0 SINUS TACHYCARDIA: ICD-10-CM

## 2024-04-22 DIAGNOSIS — R00.2 PALPITATIONS: Primary | ICD-10-CM

## 2024-04-22 DIAGNOSIS — I77.810 ASCENDING AORTA DILATATION (H): ICD-10-CM

## 2024-04-22 DIAGNOSIS — I42.9 CARDIOMYOPATHY, UNSPECIFIED TYPE (H): ICD-10-CM

## 2024-04-22 DIAGNOSIS — R55 NEAR SYNCOPE: ICD-10-CM

## 2024-04-22 DIAGNOSIS — R00.0 TACHYCARDIA: ICD-10-CM

## 2024-04-22 DIAGNOSIS — R00.0 SINUS TACHYCARDIA: Primary | ICD-10-CM

## 2024-04-22 PROCEDURE — 99215 OFFICE O/P EST HI 40 MIN: CPT | Performed by: INTERNAL MEDICINE

## 2024-04-22 RX ORDER — BISOPROLOL FUMARATE 5 MG/1
2.5 TABLET, FILM COATED ORAL DAILY
Status: SHIPPED | DISCHARGE
Start: 2024-04-22 | End: 2024-09-16 | Stop reason: DRUGHIGH

## 2024-04-22 RX ORDER — BISOPROLOL FUMARATE 5 MG/1
2.5 TABLET, FILM COATED ORAL DAILY
COMMUNITY
End: 2024-04-22

## 2024-04-22 RX ORDER — BISOPROLOL FUMARATE 5 MG/1
5 TABLET, FILM COATED ORAL DAILY
Status: DISCONTINUED | OUTPATIENT
Start: 2024-04-22 | End: 2024-04-22 | Stop reason: DRUGHIGH

## 2024-04-22 RX ORDER — BISOPROLOL FUMARATE 5 MG/1
2.5 TABLET, FILM COATED ORAL DAILY
Status: SHIPPED | DISCHARGE
Start: 2024-04-22 | End: 2024-04-22

## 2024-04-22 RX ORDER — MIDODRINE HYDROCHLORIDE 5 MG/1
TABLET ORAL
Qty: 60 TABLET | Refills: 3 | Status: SHIPPED | OUTPATIENT
Start: 2024-04-22

## 2024-04-22 NOTE — LETTER
"4/22/2024    Danielle Mitchell MD  [address] [facility] [address]  [place] [address]    RE: Juarez Jauregui       Dear Colleague,     I had the pleasure of seeing Juarez Jauregui in the [facility].  HPI and Plan:   I the pleasure meeting Mr. Jauregui today.  He is referred by my partner Dr. Gipson for possible POTS syndrome.  I did review Dr. Gipson's notes including the lab work which is all in order.  Important that the story is I do not think he has POTS based on my blood pressure numbers today.  His history was well outlined by Dr. Gipson and there is a number of issues going separate from possible POTS he has a dilated aorta that significantly enlarged but still under 50 mm he has a cardiomyopathy unclear etiology Dr. Gipson did touch on those already.  He has been tried on a atenolol metoprolol and diltiazem and had side effects or ineffectiveness of those drugs his story is that in the morning he will be nauseated and throw up and feel poorly that he is fine for most in the middle of the day and then after dinnertime when he goes to walk his dog he will have lightheaded spells.  He is never actually had a blackout spell but more of a \"gray out spell\".  Does not have significant chest pain Dr. Gipson touched on that he does not have coronary disease based on previous testing with Dr. Gipson    I did check his uvula is not bifid he is not double-jointed he does not have joint dislocations there is no known family history of Michael-Danlos or any of the aorta syndromes.  Supine blood pressure 112/79 heart rate 76 and at 3 minutes 108/81 with heart rate of 80    Standing motionless blood pressure 114/90 heart rate 80 2 minutes blood pressure 108/98 with heart rate 84 and at 3 minutes blood pressure 90/80 heart rate still in the 80s note that there is a very narrowed pulse pressure on the last 2 readings and his heart rate does not go up to 1 needs understand he is on a beta-blocker today    None " of this precipitate any dizziness    I asked the patient since he has an Apple Watch to check check his heart rate at the moment he is having the symptoms in the evening when he walks his dog he does not have access to a blood pressure machine when he is outside walking.  He does not like the atenolol either and because he has a cardiomyopathy and a dilated aorta I would like to have some beta-blocker but not for POTS I will try Zebeta 2.5 mg daily with the option of going higher if necessary I am going to prescribe him midodrine 5 mg in the morning when he first wakes up and then 5 mg at dinnertime before he takes his dog for the walk I will see if clinically this makes him feel better because the only thing I demonstrated is mild orthostasis without any tachycardia I did check his left arm and blood pressure and his right arm blood pressure and they were equal.    We could  try Florinef.  But I probably want to do a tilt table first to see if we can document true orthostasis not the mild numbers and getting here    I also reviewed his medications with him Cymbalta can cause or mimic some of the symptoms prazosin is not a favorite drug at all he is taking it for nightmares is a pure alpha blocker which is really the opposite of the midodrine that I am prescribing but he takes that at bedtime which is after most of this event we may want to keep in mind stopping the prazosin at some point see how he feels off of it the other medicine he is on are unlikely to be causing or interacting with his heart.    Regards so we do after I have his medications adjusted whether it be the midodrine the beta-blocker Florinef or tilt table we still have to keep in mind he is a young man with some type of cardiomyopathy and dilated aorta which will need to be followed indefinitely this was a 1 hour visit    No orders of the defined types were placed in this encounter.    Orders Placed This Encounter   Medications    bisoprolol  (ZEBETA) 5 MG tablet     Sig: Take 2.5 mg by mouth daily Take 2.5mg once daily     Medications Discontinued During This Encounter   Medication Reason    atenolol (TENORMIN) 25 MG tablet          Encounter Diagnoses   Name Primary?    Cardiomyopathy, unspecified type (H)     Sinus tachycardia     Ascending aorta dilatation (H24)     Tachycardia     Near syncope        CURRENT MEDICATIONS:  Current Outpatient Medications   Medication Sig Dispense Refill    bisoprolol (ZEBETA) 5 MG tablet Take 2.5 mg by mouth daily Take 2.5mg once daily      DULoxetine (CYMBALTA) 60 MG capsule 120 mg at bedtime 90 capsule 0    galcanezumab-gnlm (EMGALITY) 120 MG/ML injection Inject 120 mg Subcutaneous every 28 days      medical cannabis (Patient's own supply) (The purpose of this order is to document that the patient reports taking medical cannabis.  This is not a prescription, and is not used to certify that the patient has a qualifying medical condition.),   Capsules and vaping once daily 0 Information only 0    multivitamin w/minerals (THERA-VIT-M) tablet Take 1 tablet by mouth daily 90 tablet 3    Omega-3 Fatty Acids (FISH OIL) 1200 MG capsule Take 1 capsule (1,200 mg) by mouth at bedtime 90 capsule 3    OXcarbazepine (TRILEPTAL) 150 MG tablet Take 150 mg by mouth      prazosin (MINIPRESS) 2 MG capsule Take 2 mg by mouth at bedtime Take one along with 5mg dose at bedtime. rx from psychiatrist      prazosin (MINIPRESS) 5 MG capsule Take 1 at bedtime, along with 2mg dose. Rx from psychiatrist 90 capsule 0    Risankizumab-rzaa (SKYRIZI) 150 MG/ML subcutaneous Inject 1ml SC x 1 on week 0, 4, then q12 weeks.      rizatriptan (MAXALT) 5 MG tablet at onset of headache      study - emtricitabine-tenofovir 200-300, IDS#[id number], (TRUVADA) per tablet Take 1 tablet by mouth daily 30 tablet     Vitamin D, Cholecalciferol, 25 MCG (1000 UT) CAPS Take 2 capsules by mouth daily 90 capsule 3       ALLERGIES     Allergies   Allergen Reactions     Metoprolol Other (See Comments)     nightmares    Miconazole Other (See Comments)    Adhesive Tape Rash     Topical creams: neosporin and bacitracin    Bacitracin-Polymyxin B GI Disturbance, Rash, Dermatitis, Nausea and Other (See Comments)    Covid-19 (Mrna) Vaccine Rash     On the booster vaccine.    On the booster vaccine.   On the booster vaccine.   On the booster vaccine.    On the booster vaccine.   On the booster vaccine.    Neomycin-Bacitracin Zn-Polymyx Rash       PAST MEDICAL HISTORY:  Past Medical History:   Diagnosis Date    Motion sickness     Obstructive sleep apnea syndrome     WILBER (obstructive sleep apnea)     PONV (postoperative nausea and vomiting)     Prostatitis        PAST SURGICAL HISTORY:  Past Surgical History:   Procedure Laterality Date    COLONOSCOPY  10/2007    DENTAL SURGERY      MS REPAIR OF BICEPS TENDON AT ELBOW Left 2019    ROTATOR CUFF REPAIR RT/LT Left 2019    SEPTORHINOPLASTY N/A 2021    Procedure: Septorhinoplasty, Cosmetic Dorsal Hump Reduction;  Surgeon: Yohana Coulter MD;  Location:  OR       FAMILY HISTORY:  Family History   Problem Relation Age of Onset    Pre-Diabetes Mother     Hyperthyroidism Mother     Endometrial Cancer Mother         surgery    Other - See Comments Father         unknown    Parkinsonism Maternal Grandmother     Heart Failure Maternal Grandmother          age 80    Arrhythmia Maternal Grandmother         on blood thinners    Cancer Maternal Grandmother         TOB, ? esophageal    Dementia Maternal Grandfather     Prostate Cancer Maternal Grandfather     Colon Cancer Maternal Grandfather 84    Dementia Paternal Grandfather     Other - See Comments Sister         hysterectomy after 4th child    Hypothyroidism Maternal Uncle        SOCIAL HISTORY:  Social History     Socioeconomic History    Marital status: Single   Tobacco Use    Smoking status: Former     Current packs/day: 0.00     Average packs/day: 0.2 packs/day for 3.0 years  "(0.6 ttl pk-yrs)     Types: Cigarettes     Start date:      Quit date: 2011     Years since quittin.3    Smokeless tobacco: Never   Vaping Use    Vaping status: Never Used   Substance and Sexual Activity    Alcohol use: Not Currently    Drug use: Yes     Types: Marijuana     Social Determinants of Health     Interpersonal Safety: High Risk (1/15/2024)    Interpersonal Safety     Do you feel physically and emotionally safe where you currently live?: No     Within the past 12 months, have you been hit, slapped, kicked or otherwise physically hurt by someone?: No     Within the past 12 months, have you been humiliated or emotionally abused in other ways by your partner or ex-partner?: No       Review of Systems:  Skin:        Eyes:       ENT:       Respiratory:       Cardiovascular:       Gastroenterology:      Genitourinary:       Musculoskeletal:       Neurologic:       Psychiatric:       Heme/Lymph/Imm:       Endocrine:         Physical Exam:  Vitals: /78   Pulse 84   Ht 1.778 m (5' 10\")   Wt 82.6 kg (182 lb)   SpO2 96%   BMI 26.11 kg/m        Constitutional:           Skin:             Head:           Eyes:           Lymph:      ENT:           Neck:           Respiratory:            Cardiac:                                                           GI:           Extremities and Muscular Skeletal:                 Neurological:           Psych:         Recent Lab Results:  LIPID RESULTS:  Lab Results   Component Value Date    CHOL 184 2023    CHOL 162.0 2021    HDL 37 (L) 2023    HDL 32.0 (L) 2021     (H) 2023    LDL 76.0 2021    LDL 88 2018    TRIG 182 (H) 2023    TRIG 272.0 (H) 2021    CHOLHDLRATIO 5.1 (H) 2021       LIVER ENZYME RESULTS:  Lab Results   Component Value Date    AST 40.0 2021    ALT 48.0 2021       CBC RESULTS:  Lab Results   Component Value Date    WBC 9.6 2023    WBC 6.6 2019    RBC 5.21 " "04/05/2023    RBC 5.18 03/08/2019    HGB 16.3 04/05/2023    HGB 16.0 03/17/2021    HCT 47.9 04/05/2023    HCT 47.0 03/17/2021    MCV 92 04/05/2023    MCV 90.7 03/17/2021    MCH 31.3 04/05/2023    MCH 30.9 03/17/2021    MCHC 34.0 04/05/2023    MCHC 34.0 03/17/2021    RDW 13.2 04/05/2023    RDW 13.1 03/17/2021     04/05/2023     03/08/2019       BMP RESULTS:  Lab Results   Component Value Date     01/15/2024    .0 03/17/2021    POTASSIUM 4.2 01/15/2024    POTASSIUM 4.3 08/31/2021    POTASSIUM 4.0 03/17/2021    CHLORIDE 103 01/15/2024    CHLORIDE 106.0 03/17/2021    CO2 25 01/15/2024    CO2 28.0 03/17/2021    ANIONGAP 13 01/15/2024    ANIONGAP 11 05/24/2015    GLC 74 01/15/2024     (H) 09/01/2021    .0 (H) 03/17/2021    BUN 14.1 01/15/2024    BUN 10.0 03/17/2021    CR 1.08 01/15/2024    CR 0.9 03/17/2021    GFRESTIMATED 90 01/15/2024    GFRESTIMATED >90  Non  GFR Calc   05/24/2015    GFRESTBLACK >90   GFR Calc   05/24/2015    ISAIAH 9.6 01/15/2024    ISAIAH 9.6 03/17/2021        A1C RESULTS:  Lab Results   Component Value Date    A1C 5.2 03/17/2021       INR RESULTS:  No results found for: \"INR\"        CC  Uriel Cottrell MD  [address]  [place] [id number]      Thank you for allowing me to participate in the care of your patient.      Sincerely,     Charlie Schilling MD     [facility]  "

## 2024-04-22 NOTE — PROGRESS NOTES
"HPI and Plan:   I the pleasure meeting Mr. Jauregui today.  He is referred by my partner Dr. Gipson for possible POTS syndrome.  I did review Dr. Gipson's notes including the lab work which is all in order.  Important that the story is I do not think he has POTS based on my blood pressure numbers today.  His history was well outlined by Dr. Gipson and there is a number of issues going separate from possible POTS he has a dilated aorta that significantly enlarged but still under 50 mm he has a cardiomyopathy unclear etiology Dr. Gipson did touch on those already.  He has been tried on a atenolol metoprolol and diltiazem and had side effects or ineffectiveness of those drugs his story is that in the morning he will be nauseated and throw up and feel poorly that he is fine for most in the middle of the day and then after dinnertime when he goes to walk his dog he will have lightheaded spells.  He is never actually had a blackout spell but more of a \"gray out spell\".  Does not have significant chest pain Dr. Gipson touched on that he does not have coronary disease based on previous testing with Dr. Gipson    I did check his uvula is not bifid he is not double-jointed he does not have joint dislocations there is no known family history of Michael-Danlos or any of the aorta syndromes.  Supine blood pressure 112/79 heart rate 76 and at 3 minutes 108/81 with heart rate of 80    Standing motionless blood pressure 114/90 heart rate 80 2 minutes blood pressure 108/98 with heart rate 84 and at 3 minutes blood pressure 90/80 heart rate still in the 80s note that there is a very narrowed pulse pressure on the last 2 readings and his heart rate does not go up to 1 needs understand he is on a beta-blocker today    None of this precipitate any dizziness    I asked the patient since he has an Apple Watch to check check his heart rate at the moment he is having the symptoms in the evening when he walks his dog he does not have " access to a blood pressure machine when he is outside walking.  He does not like the atenolol either and because he has a cardiomyopathy and a dilated aorta I would like to have some beta-blocker but not for POTS I will try Zebeta 2.5 mg daily with the option of going higher if necessary I am going to prescribe him midodrine 5 mg in the morning when he first wakes up and then 5 mg at dinnertime before he takes his dog for the walk I will see if clinically this makes him feel better because the only thing I demonstrated is mild orthostasis without any tachycardia I did check his left arm and blood pressure and his right arm blood pressure and they were equal.    We could  try Florinef.  But I probably want to do a tilt table first to see if we can document true orthostasis not the mild numbers and getting here    I also reviewed his medications with him Cymbalta can cause or mimic some of the symptoms prazosin is not a favorite drug at all he is taking it for nightmares is a pure alpha blocker which is really the opposite of the midodrine that I am prescribing but he takes that at bedtime which is after most of this event we may want to keep in mind stopping the prazosin at some point see how he feels off of it the other medicine he is on are unlikely to be causing or interacting with his heart.    Regards so we do after I have his medications adjusted whether it be the midodrine the beta-blocker Florinef or tilt table we still have to keep in mind he is a young man with some type of cardiomyopathy and dilated aorta which will need to be followed indefinitely this was a 1 hour visit    No orders of the defined types were placed in this encounter.    Orders Placed This Encounter   Medications    bisoprolol (ZEBETA) 5 MG tablet     Sig: Take 2.5 mg by mouth daily Take 2.5mg once daily     Medications Discontinued During This Encounter   Medication Reason    atenolol (TENORMIN) 25 MG tablet          Encounter Diagnoses    Name Primary?    Cardiomyopathy, unspecified type (H)     Sinus tachycardia     Ascending aorta dilatation (H24)     Tachycardia     Near syncope        CURRENT MEDICATIONS:  Current Outpatient Medications   Medication Sig Dispense Refill    bisoprolol (ZEBETA) 5 MG tablet Take 2.5 mg by mouth daily Take 2.5mg once daily      DULoxetine (CYMBALTA) 60 MG capsule 120 mg at bedtime 90 capsule 0    galcanezumab-gnlm (EMGALITY) 120 MG/ML injection Inject 120 mg Subcutaneous every 28 days      medical cannabis (Patient's own supply) (The purpose of this order is to document that the patient reports taking medical cannabis.  This is not a prescription, and is not used to certify that the patient has a qualifying medical condition.),   Capsules and vaping once daily 0 Information only 0    multivitamin w/minerals (THERA-VIT-M) tablet Take 1 tablet by mouth daily 90 tablet 3    Omega-3 Fatty Acids (FISH OIL) 1200 MG capsule Take 1 capsule (1,200 mg) by mouth at bedtime 90 capsule 3    OXcarbazepine (TRILEPTAL) 150 MG tablet Take 150 mg by mouth      prazosin (MINIPRESS) 2 MG capsule Take 2 mg by mouth at bedtime Take one along with 5mg dose at bedtime. rx from psychiatrist      prazosin (MINIPRESS) 5 MG capsule Take 1 at bedtime, along with 2mg dose. Rx from psychiatrist 90 capsule 0    Risankizumab-rzaa (SKYRIZI) 150 MG/ML subcutaneous Inject 1ml SC x 1 on week 0, 4, then q12 weeks.      rizatriptan (MAXALT) 5 MG tablet at onset of headache      study - emtricitabine-tenofovir 200-300, IDS#[id number], (TRUVADA) per tablet Take 1 tablet by mouth daily 30 tablet     Vitamin D, Cholecalciferol, 25 MCG (1000 UT) CAPS Take 2 capsules by mouth daily 90 capsule 3       ALLERGIES     Allergies   Allergen Reactions    Metoprolol Other (See Comments)     nightmares    Miconazole Other (See Comments)    Adhesive Tape Rash     Topical creams: neosporin and bacitracin    Bacitracin-Polymyxin B GI Disturbance, Rash, Dermatitis, Nausea and  Other (See Comments)    Covid-19 (Mrna) Vaccine Rash     On the booster vaccine.    On the booster vaccine.   On the booster vaccine.   On the booster vaccine.    On the booster vaccine.   On the booster vaccine.    Neomycin-Bacitracin Zn-Polymyx Rash       PAST MEDICAL HISTORY:  Past Medical History:   Diagnosis Date    Motion sickness     Obstructive sleep apnea syndrome     WILBER (obstructive sleep apnea)     PONV (postoperative nausea and vomiting)     Prostatitis        PAST SURGICAL HISTORY:  Past Surgical History:   Procedure Laterality Date    COLONOSCOPY  10/2007    DENTAL SURGERY      MO REPAIR OF BICEPS TENDON AT ELBOW Left 2019    ROTATOR CUFF REPAIR RT/LT Left 2019    SEPTORHINOPLASTY N/A 2021    Procedure: Septorhinoplasty, Cosmetic Dorsal Hump Reduction;  Surgeon: Yohana Coulter MD;  Location:  OR       FAMILY HISTORY:  Family History   Problem Relation Age of Onset    Pre-Diabetes Mother     Hyperthyroidism Mother     Endometrial Cancer Mother         surgery    Other - See Comments Father         unknown    Parkinsonism Maternal Grandmother     Heart Failure Maternal Grandmother          age 80    Arrhythmia Maternal Grandmother         on blood thinners    Cancer Maternal Grandmother         TOB, ? esophageal    Dementia Maternal Grandfather     Prostate Cancer Maternal Grandfather     Colon Cancer Maternal Grandfather 84    Dementia Paternal Grandfather     Other - See Comments Sister         hysterectomy after 4th child    Hypothyroidism Maternal Uncle        SOCIAL HISTORY:  Social History     Socioeconomic History    Marital status: Single   Tobacco Use    Smoking status: Former     Current packs/day: 0.00     Average packs/day: 0.2 packs/day for 3.0 years (0.6 ttl pk-yrs)     Types: Cigarettes     Start date:      Quit date: 2011     Years since quittin.3    Smokeless tobacco: Never   Vaping Use    Vaping status: Never Used   Substance and Sexual Activity     "Alcohol use: Not Currently    Drug use: Yes     Types: Marijuana     Social Determinants of Health     Interpersonal Safety: High Risk (1/15/2024)    Interpersonal Safety     Do you feel physically and emotionally safe where you currently live?: No     Within the past 12 months, have you been hit, slapped, kicked or otherwise physically hurt by someone?: No     Within the past 12 months, have you been humiliated or emotionally abused in other ways by your partner or ex-partner?: No       Review of Systems:  Skin:        Eyes:       ENT:       Respiratory:       Cardiovascular:       Gastroenterology:      Genitourinary:       Musculoskeletal:       Neurologic:       Psychiatric:       Heme/Lymph/Imm:       Endocrine:         Physical Exam:  Vitals: /78   Pulse 84   Ht 1.778 m (5' 10\")   Wt 82.6 kg (182 lb)   SpO2 96%   BMI 26.11 kg/m        Constitutional:           Skin:             Head:           Eyes:           Lymph:      ENT:           Neck:           Respiratory:            Cardiac:                                                           GI:           Extremities and Muscular Skeletal:                 Neurological:           Psych:         Recent Lab Results:  LIPID RESULTS:  Lab Results   Component Value Date    CHOL 184 04/05/2023    CHOL 162.0 03/17/2021    HDL 37 (L) 04/05/2023    HDL 32.0 (L) 03/17/2021     (H) 04/05/2023    LDL 76.0 03/17/2021    LDL 88 03/20/2018    TRIG 182 (H) 04/05/2023    TRIG 272.0 (H) 03/17/2021    CHOLHDLRATIO 5.1 (H) 03/17/2021       LIVER ENZYME RESULTS:  Lab Results   Component Value Date    AST 40.0 03/17/2021    ALT 48.0 03/17/2021       CBC RESULTS:  Lab Results   Component Value Date    WBC 9.6 04/05/2023    WBC 6.6 03/08/2019    RBC 5.21 04/05/2023    RBC 5.18 03/08/2019    HGB 16.3 04/05/2023    HGB 16.0 03/17/2021    HCT 47.9 04/05/2023    HCT 47.0 03/17/2021    MCV 92 04/05/2023    MCV 90.7 03/17/2021    MCH 31.3 04/05/2023    MCH 30.9 03/17/2021 " "   MCHC 34.0 04/05/2023    MCHC 34.0 03/17/2021    RDW 13.2 04/05/2023    RDW 13.1 03/17/2021     04/05/2023     03/08/2019       BMP RESULTS:  Lab Results   Component Value Date     01/15/2024    .0 03/17/2021    POTASSIUM 4.2 01/15/2024    POTASSIUM 4.3 08/31/2021    POTASSIUM 4.0 03/17/2021    CHLORIDE 103 01/15/2024    CHLORIDE 106.0 03/17/2021    CO2 25 01/15/2024    CO2 28.0 03/17/2021    ANIONGAP 13 01/15/2024    ANIONGAP 11 05/24/2015    GLC 74 01/15/2024     (H) 09/01/2021    .0 (H) 03/17/2021    BUN 14.1 01/15/2024    BUN 10.0 03/17/2021    CR 1.08 01/15/2024    CR 0.9 03/17/2021    GFRESTIMATED 90 01/15/2024    GFRESTIMATED >90  Non  GFR Calc   05/24/2015    GFRESTBLACK >90   GFR Calc   05/24/2015    ISAIAH 9.6 01/15/2024    ISAIAH 9.6 03/17/2021        A1C RESULTS:  Lab Results   Component Value Date    A1C 5.2 03/17/2021       INR RESULTS:  No results found for: \"INR\"        CC  Uriel Cottrell MD  [address]  [place] [id number]  "

## 2024-04-22 NOTE — TELEPHONE ENCOUNTER
Patient here in clinic today, Dr. Schilling is unable to prescribe Bisoprolol as an in clinic medication.  Can you please contact patients pharmacy- [facility], [facility]- Phone [phone] Fax [phone] and give a verbal order to prescribe Bisoprolol 2.5mg daily per Dr. Schilling.  Medication added to patients med list.      Anup SILVA(AAMA) 04/22/24  10:18 AM

## 2024-04-24 ENCOUNTER — MYC MEDICAL ADVICE (OUTPATIENT)
Dept: CARDIOLOGY | Facility: CLINIC | Age: 39
End: 2024-04-24
Payer: COMMERCIAL

## 2024-04-25 NOTE — TELEPHONE ENCOUNTER
Called Pt Per DR Schilling and Reviewed:     Charlie Schilling MD Schwartz, Tammy J, RN  Phone Number: [phone]     Yes as you said this HR is not too disconcerting  I think we started zebeta/bisoprolol at 2.5  Was he on it yet?  If not try starting it  If already on he could try 5mg/day but we would need him to check bp once on the higher dose  Does he have upcoming office visits?   He may need tilt table and I dont remember if I rx midodrine prn for him  I am not a fan  of prazosin so he might want to stop that for 2-3 weeks and see if that helps      Pt had not started Zebeta yet. He started right after this HR check. He has Office visit 5/13/24 for F/U. He does not have midodrine, but it is on med list, will message Pt to see if he picked it up.  and a tilt table not yet ordered. Discussed also Prazosin, and if he may want to stop it and see if that helps. Asked Pt to let this nurse know if futher concerns. GONSALO Durham RN

## 2024-05-31 ENCOUNTER — OFFICE VISIT (OUTPATIENT)
Dept: CARDIOLOGY | Facility: CLINIC | Age: 39
End: 2024-05-31
Payer: COMMERCIAL

## 2024-05-31 VITALS
SYSTOLIC BLOOD PRESSURE: 112 MMHG | OXYGEN SATURATION: 98 % | DIASTOLIC BLOOD PRESSURE: 70 MMHG | WEIGHT: 182 LBS | HEIGHT: 70 IN | BODY MASS INDEX: 26.05 KG/M2 | HEART RATE: 92 BPM

## 2024-05-31 DIAGNOSIS — I42.9 CARDIOMYOPATHY, UNSPECIFIED TYPE (H): ICD-10-CM

## 2024-05-31 DIAGNOSIS — R55 NEAR SYNCOPE: Primary | ICD-10-CM

## 2024-05-31 DIAGNOSIS — R00.0 TACHYCARDIA: ICD-10-CM

## 2024-05-31 DIAGNOSIS — I77.810 ASCENDING AORTA DILATATION (H): ICD-10-CM

## 2024-05-31 DIAGNOSIS — D72.18 EOSINOPHILIC MYOCARDITIS: ICD-10-CM

## 2024-05-31 DIAGNOSIS — I40.1 EOSINOPHILIC MYOCARDITIS: ICD-10-CM

## 2024-05-31 DIAGNOSIS — I71.20 THORACIC AORTIC ANEURYSM WITHOUT RUPTURE, UNSPECIFIED PART (H): ICD-10-CM

## 2024-05-31 DIAGNOSIS — R00.0 SINUS TACHYCARDIA: ICD-10-CM

## 2024-05-31 PROCEDURE — 99213 OFFICE O/P EST LOW 20 MIN: CPT | Performed by: INTERNAL MEDICINE

## 2024-05-31 NOTE — LETTER
5/31/2024    Danielle Mitchell MD  [address] [facility]  [facility] [place] [facility] [address]    RE: Juarez TAYLOR Juventino       Dear Colleague,     I had the pleasure of seeing Juarez Jauregui in the [facility].  HPI and Plan:   Today had a follow-up visit with Mr. Jauregui he was referred to me by my partner Dr. Gipson.  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  Because of possible POTS syndrome that is why he was sent to me but he did not meet any of the criteria for POTS when I saw him on the first visit and I repeated at least in the office limited orthostatics and they are completely normal he does note on his Apple Watch still some of his heart rate will be 120s or higher when he is feeling the most fatigued.  I do not know if he has Michael-Danlos or similar syndrome but he has a cardiomyopathy    Echo on 5/2023 reports EF of 30 to 35% with a global abnormal pattern right ventricle was listed as a moderate RV dysfunction in the ER was dilated to 47 mm a follow-up echo in August 2023 shows LVEF 35-40 visual and 43 by biplane the right ventricle is now reported as normal the aorta is still reported to be dilated 46 mm.  A stress echo dated 10/2023 shows the patient could not elevate his heart rate even going 8 minutes and exercise the EF is now up to 40-45 the peak heart rate was 131 blood pressure 150/56    The patient never had a cardiac MRI he was actually referred to me for POTS but I do not think he has POTS and that is why I am's suspicious there may be another disease the episodes did not start until later in life at age 37-38 but I wonder if he should get a cardiac MRI looking for infiltrative cardiomyopathy which should also still possibly include viral cardiomyopathy metabolic or bio enzyme problems.  There is no clinical or family history of cardiomyopathy      Thyroid levels were  normal in 2021 they were not repeated since then.  HIV testing etc. was all negative    Overall that he was referred to me for POTS I do not diagnose POTS by her vital signs on the him see my teacher Dr. Boyle and perhaps get a repeat tilt table study    After that I think we should return care to Dr. Gipson and I would probably recommend cardiac MRI we may want to do genetic testing for cardiomyopathy if we do not find anything specific to explain the drop in ejection fraction I do not think the dilated aorta that is moderate will account for these variable symptoms.  Whether he should see a neurologist might be important and also determine if there is some neuromuscular issue that were not seen and again I am totally excluded inflammatory infectious or metabolic problems or cough with a cardiomyopathy.    This point I think the patient should limit his exercise and standing I am not can order another echo because I suspect he should get a cardiac MRI I would suggest that he really not stand for even 30 to 60 minutes because he does become rather symptomatic and tachycardic and dizzy.  Exercise program is appropriate this time until we do additional testing.  Will asked Dr. Boyle his opinion and if he wants to move ahead with ordering cardiac MRI and some the other discussions above    No orders of the defined types were placed in this encounter.    There are no discontinued medications.      Encounter Diagnoses   Name Primary?    Near syncope Yes    Eosinophilic myocarditis     Cardiomyopathy, unspecified type (H)     Thoracic aortic aneurysm without rupture, unspecified part (H24)        CURRENT MEDICATIONS:  Current Outpatient Medications   Medication Sig Dispense Refill    bisoprolol (ZEBETA) 5 MG tablet Take 0.5 tablets (2.5 mg) by mouth daily      DULoxetine (CYMBALTA) 60 MG capsule 120 mg at bedtime 90 capsule 0    galcanezumab-gnlm (EMGALITY) 120 MG/ML injection Inject 120 mg Subcutaneous every 28 days       medical cannabis (Patient's own supply) (The purpose of this order is to document that the patient reports taking medical cannabis.  This is not a prescription, and is not used to certify that the patient has a qualifying medical condition.),   Capsules and vaping once daily 0 Information only 0    midodrine (PROAMATINE) 5 MG tablet 1 tablet in AM and 1 tablet at dinner time 60 tablet 3    multivitamin w/minerals (THERA-VIT-M) tablet Take 1 tablet by mouth daily 90 tablet 3    Omega-3 Fatty Acids (FISH OIL) 1200 MG capsule Take 1 capsule (1,200 mg) by mouth at bedtime 90 capsule 3    OXcarbazepine (TRILEPTAL) 150 MG tablet Take 150 mg by mouth      prazosin (MINIPRESS) 2 MG capsule Take 2 mg by mouth at bedtime Take one along with 5mg dose at bedtime. rx from psychiatrist      prazosin (MINIPRESS) 5 MG capsule Take 1 at bedtime, along with 2mg dose. Rx from psychiatrist 90 capsule 0    Risankizumab-rzaa (SKYRIZI) 150 MG/ML subcutaneous Inject 1ml SC x 1 on week 0, 4, then q12 weeks.      rizatriptan (MAXALT) 5 MG tablet at onset of headache      study - emtricitabine-tenofovir 200-300, IDS#[id number], (TRUVADA) per tablet Take 1 tablet by mouth daily 30 tablet     Vitamin D, Cholecalciferol, 25 MCG (1000 UT) CAPS Take 2 capsules by mouth daily 90 capsule 3       ALLERGIES     Allergies   Allergen Reactions    Metoprolol Other (See Comments)     nightmares    Miconazole Other (See Comments)    Adhesive Tape Rash     Topical creams: neosporin and bacitracin    Bacitracin-Polymyxin B GI Disturbance, Rash, Dermatitis, Nausea and Other (See Comments)    Covid-19 (Mrna) Vaccine Rash     On the booster vaccine.    On the booster vaccine.   On the booster vaccine.   On the booster vaccine.    On the booster vaccine.   On the booster vaccine.    Neomycin-Bacitracin Zn-Polymyx Rash       PAST MEDICAL HISTORY:  Past Medical History:   Diagnosis Date    Motion sickness     Obstructive sleep apnea syndrome     WILBER (obstructive  sleep apnea)     PONV (postoperative nausea and vomiting)     Prostatitis        PAST SURGICAL HISTORY:  Past Surgical History:   Procedure Laterality Date    COLONOSCOPY  10/2007    DENTAL SURGERY      MA REPAIR OF BICEPS TENDON AT ELBOW Left 2019    ROTATOR CUFF REPAIR RT/LT Left 2019    SEPTORHINOPLASTY N/A 2021    Procedure: Septorhinoplasty, Cosmetic Dorsal Hump Reduction;  Surgeon: Yohana Coulter MD;  Location: [facility]       FAMILY HISTORY:  Family History   Problem Relation Age of Onset    Pre-Diabetes Mother     Hyperthyroidism Mother     Endometrial Cancer Mother         surgery    Other - See Comments Father         unknown    Parkinsonism Maternal Grandmother     Heart Failure Maternal Grandmother          age 80    Arrhythmia Maternal Grandmother         on blood thinners    Cancer Maternal Grandmother         TOB, ? esophageal    Dementia Maternal Grandfather     Prostate Cancer Maternal Grandfather     Colon Cancer Maternal Grandfather 84    Dementia Paternal Grandfather     Other - See Comments Sister         hysterectomy after 4th child    Hypothyroidism Maternal Uncle        SOCIAL HISTORY:  Social History     Socioeconomic History    Marital status: Single   Tobacco Use    Smoking status: Former     Current packs/day: 0.00     Average packs/day: 0.2 packs/day for 3.0 years (0.6 ttl pk-yrs)     Types: Cigarettes     Start date:      Quit date: 2011     Years since quittin.4    Smokeless tobacco: Never   Vaping Use    Vaping status: Never Used   Substance and Sexual Activity    Alcohol use: Not Currently    Drug use: Yes     Types: Marijuana     Social Determinants of Health     Interpersonal Safety: High Risk (1/15/2024)    Interpersonal Safety     Do you feel physically and emotionally safe where you currently live?: No     Within the past 12 months, have you been hit, slapped, kicked or otherwise physically hurt by someone?: No     Within the past 12 months, have you  "been humiliated or emotionally abused in other ways by your partner or ex-partner?: No       Review of Systems:  Skin:        Eyes:       ENT:       Respiratory:       Cardiovascular:       Gastroenterology:      Genitourinary:       Musculoskeletal:       Neurologic:       Psychiatric:       Heme/Lymph/Imm:       Endocrine:         Physical Exam:  Vitals: /70 (Patient Position: Standing)   Pulse 92   Ht 1.778 m (5' 10\")   Wt 82.6 kg (182 lb)   SpO2 98%   BMI 26.11 kg/m   Orthostatic Vitals from 05/29/24 1518 to 05/31/24 1518    Date and Time Orthostatic BP Orthostatic Pulse Patient Position BP   Location Cuff Size   05/31/24 1510 -- -- Standing -- --   05/31/24 1419 -- -- Sitting -- --         Constitutional:           Skin:             Head:           Eyes:           Lymph:      ENT:           Neck:           Respiratory:            Cardiac:                                                           GI:           Extremities and Muscular Skeletal:                 Neurological:           Psych:         Recent Lab Results:  LIPID RESULTS:  Lab Results   Component Value Date    CHOL 184 04/05/2023    CHOL 162.0 03/17/2021    HDL 37 (L) 04/05/2023    HDL 32.0 (L) 03/17/2021     (H) 04/05/2023    LDL 76.0 03/17/2021    LDL 88 03/20/2018    TRIG 182 (H) 04/05/2023    TRIG 272.0 (H) 03/17/2021    CHOLHDLRATIO 5.1 (H) 03/17/2021       LIVER ENZYME RESULTS:  Lab Results   Component Value Date    AST 40.0 03/17/2021    ALT 48.0 03/17/2021       CBC RESULTS:  Lab Results   Component Value Date    WBC 9.6 04/05/2023    WBC 6.6 03/08/2019    RBC 5.21 04/05/2023    RBC 5.18 03/08/2019    HGB 16.3 04/05/2023    HGB 16.0 03/17/2021    HCT 47.9 04/05/2023    HCT 47.0 03/17/2021    MCV 92 04/05/2023    MCV 90.7 03/17/2021    MCH 31.3 04/05/2023    MCH 30.9 03/17/2021    MCHC 34.0 04/05/2023    MCHC 34.0 03/17/2021    RDW 13.2 04/05/2023    RDW 13.1 03/17/2021     04/05/2023     03/08/2019       BMP " "RESULTS:  Lab Results   Component Value Date     01/15/2024    .0 03/17/2021    POTASSIUM 4.2 01/15/2024    POTASSIUM 4.3 08/31/2021    POTASSIUM 4.0 03/17/2021    CHLORIDE 103 01/15/2024    CHLORIDE 106.0 03/17/2021    CO2 25 01/15/2024    CO2 28.0 03/17/2021    ANIONGAP 13 01/15/2024    ANIONGAP 11 05/24/2015    GLC 74 01/15/2024     (H) 09/01/2021    .0 (H) 03/17/2021    BUN 14.1 01/15/2024    BUN 10.0 03/17/2021    CR 1.08 01/15/2024    CR 0.9 03/17/2021    GFRESTIMATED 90 01/15/2024    GFRESTIMATED >90  Non  GFR Calc   05/24/2015    GFRESTBLACK >90   GFR Calc   05/24/2015    ISAIAH 9.6 01/15/2024    SIAIAH 9.6 03/17/2021        A1C RESULTS:  Lab Results   Component Value Date    A1C 5.2 03/17/2021       INR RESULTS:  No results found for: \"INR\"        CC  Referred Self,     Thank you for allowing me to participate in the care of your patient.      Sincerely,     Charlie Schilling MD     [facility]  "

## 2024-05-31 NOTE — PROGRESS NOTES
HPI and Plan:   Today had a follow-up visit with Mr. Jauregui he was referred to me by my partner Dr. Gipson.  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  Because of possible POTS syndrome that is why he was sent to me but he did not meet any of the criteria for POTS when I saw him on the first visit and I repeated at least in the office limited orthostatics and they are completely normal he does note on his Apple Watch still some of his heart rate will be 120s or higher when he is feeling the most fatigued.  I do not know if he has Michael-Danlos or similar syndrome but he has a cardiomyopathy    Echo on 5/2023 reports EF of 30 to 35% with a global abnormal pattern right ventricle was listed as a moderate RV dysfunction in the ER was dilated to 47 mm a follow-up echo in August 2023 shows LVEF 35-40 visual and 43 by biplane the right ventricle is now reported as normal the aorta is still reported to be dilated 46 mm.  A stress echo dated 10/2023 shows the patient could not elevate his heart rate even going 8 minutes and exercise the EF is now up to 40-45 the peak heart rate was 131 blood pressure 150/56    The patient never had a cardiac MRI he was actually referred to me for POTS but I do not think he has POTS and that is why I am's suspicious there may be another disease the episodes did not start until later in life at age [age] but I wonder if he should get a cardiac MRI looking for infiltrative cardiomyopathy which should also still possibly include viral cardiomyopathy metabolic or bio enzyme problems.  There is no clinical or family history of cardiomyopathy      Thyroid levels were normal in 2021 they were not repeated since then.  HIV testing etc. was all negative    Overall that he was referred to me for POTS I do not diagnose POTS by her vital signs on the him see my teacher Dr. Boyle  and perhaps get a repeat tilt table study    After that I think we should return care to Dr. Gipson and I would probably recommend cardiac MRI we may want to do genetic testing for cardiomyopathy if we do not find anything specific to explain the drop in ejection fraction I do not think the dilated aorta that is moderate will account for these variable symptoms.  Whether he should see a neurologist might be important and also determine if there is some neuromuscular issue that were not seen and again I am totally excluded inflammatory infectious or metabolic problems or cough with a cardiomyopathy.    This point I think the patient should limit his exercise and standing I am not can order another echo because I suspect he should get a cardiac MRI I would suggest that he really not stand for even 30 to 60 minutes because he does become rather symptomatic and tachycardic and dizzy.  Exercise program is appropriate this time until we do additional testing.  Will asked Dr. Boyle his opinion and if he wants to move ahead with ordering cardiac MRI and some the other discussions above    No orders of the defined types were placed in this encounter.    There are no discontinued medications.      Encounter Diagnoses   Name Primary?    Near syncope Yes    Eosinophilic myocarditis     Cardiomyopathy, unspecified type (H)     Thoracic aortic aneurysm without rupture, unspecified part (H24)        CURRENT MEDICATIONS:  Current Outpatient Medications   Medication Sig Dispense Refill    bisoprolol (ZEBETA) 5 MG tablet Take 0.5 tablets (2.5 mg) by mouth daily      DULoxetine (CYMBALTA) 60 MG capsule 120 mg at bedtime 90 capsule 0    galcanezumab-gnlm (EMGALITY) 120 MG/ML injection Inject 120 mg Subcutaneous every 28 days      medical cannabis (Patient's own supply) (The purpose of this order is to document that the patient reports taking medical cannabis.  This is not a prescription, and is not used to certify that the patient  has a qualifying medical condition.),   Capsules and vaping once daily 0 Information only 0    midodrine (PROAMATINE) 5 MG tablet 1 tablet in AM and 1 tablet at dinner time 60 tablet 3    multivitamin w/minerals (THERA-VIT-M) tablet Take 1 tablet by mouth daily 90 tablet 3    Omega-3 Fatty Acids (FISH OIL) 1200 MG capsule Take 1 capsule (1,200 mg) by mouth at bedtime 90 capsule 3    OXcarbazepine (TRILEPTAL) 150 MG tablet Take 150 mg by mouth      prazosin (MINIPRESS) 2 MG capsule Take 2 mg by mouth at bedtime Take one along with 5mg dose at bedtime. rx from psychiatrist      prazosin (MINIPRESS) 5 MG capsule Take 1 at bedtime, along with 2mg dose. Rx from psychiatrist 90 capsule 0    Risankizumab-rzaa (SKYRIZI) 150 MG/ML subcutaneous Inject 1ml SC x 1 on week 0, 4, then q12 weeks.      rizatriptan (MAXALT) 5 MG tablet at onset of headache      study - emtricitabine-tenofovir 200-300, IDS#[id number], (TRUVADA) per tablet Take 1 tablet by mouth daily 30 tablet     Vitamin D, Cholecalciferol, 25 MCG (1000 UT) CAPS Take 2 capsules by mouth daily 90 capsule 3       ALLERGIES     Allergies   Allergen Reactions    Metoprolol Other (See Comments)     nightmares    Miconazole Other (See Comments)    Adhesive Tape Rash     Topical creams: neosporin and bacitracin    Bacitracin-Polymyxin B GI Disturbance, Rash, Dermatitis, Nausea and Other (See Comments)    Covid-19 (Mrna) Vaccine Rash     On the booster vaccine.    On the booster vaccine.   On the booster vaccine.   On the booster vaccine.    On the booster vaccine.   On the booster vaccine.    Neomycin-Bacitracin Zn-Polymyx Rash       PAST MEDICAL HISTORY:  Past Medical History:   Diagnosis Date    Motion sickness     Obstructive sleep apnea syndrome     WILBER (obstructive sleep apnea)     PONV (postoperative nausea and vomiting)     Prostatitis        PAST SURGICAL HISTORY:  Past Surgical History:   Procedure Laterality Date    COLONOSCOPY  10/2007    DENTAL SURGERY      AR REPAIR  OF BICEPS TENDON AT ELBOW Left 2019    ROTATOR CUFF REPAIR RT/LT Left 2019    SEPTORHINOPLASTY N/A 2021    Procedure: Septorhinoplasty, Cosmetic Dorsal Hump Reduction;  Surgeon: Yohana Coulter MD;  Location:  OR       FAMILY HISTORY:  Family History   Problem Relation Age of Onset    Pre-Diabetes Mother     Hyperthyroidism Mother     Endometrial Cancer Mother         surgery    Other - See Comments Father         unknown    Parkinsonism Maternal Grandmother     Heart Failure Maternal Grandmother          age 80    Arrhythmia Maternal Grandmother         on blood thinners    Cancer Maternal Grandmother         TOB, ? esophageal    Dementia Maternal Grandfather     Prostate Cancer Maternal Grandfather     Colon Cancer Maternal Grandfather 84    Dementia Paternal Grandfather     Other - See Comments Sister         hysterectomy after 4th child    Hypothyroidism Maternal Uncle        SOCIAL HISTORY:  Social History     Socioeconomic History    Marital status: Single   Tobacco Use    Smoking status: Former     Current packs/day: 0.00     Average packs/day: 0.2 packs/day for 3.0 years (0.6 ttl pk-yrs)     Types: Cigarettes     Start date:      Quit date:      Years since quittin.4    Smokeless tobacco: Never   Vaping Use    Vaping status: Never Used   Substance and Sexual Activity    Alcohol use: Not Currently    Drug use: Yes     Types: Marijuana     Social Determinants of Health     Interpersonal Safety: High Risk (1/15/2024)    Interpersonal Safety     Do you feel physically and emotionally safe where you currently live?: No     Within the past 12 months, have you been hit, slapped, kicked or otherwise physically hurt by someone?: No     Within the past 12 months, have you been humiliated or emotionally abused in other ways by your partner or ex-partner?: No       Review of Systems:  Skin:        Eyes:       ENT:       Respiratory:       Cardiovascular:       Gastroenterology:     "  Genitourinary:       Musculoskeletal:       Neurologic:       Psychiatric:       Heme/Lymph/Imm:       Endocrine:         Physical Exam:  Vitals: /70 (Patient Position: Standing)   Pulse 92   Ht 1.778 m (5' 10\")   Wt 82.6 kg (182 lb)   SpO2 98%   BMI 26.11 kg/m   Orthostatic Vitals from 05/29/24 1518 to 05/31/24 1518    Date and Time Orthostatic BP Orthostatic Pulse Patient Position BP   Location Cuff Size   05/31/24 1510 -- -- Standing -- --   05/31/24 1419 -- -- Sitting -- --         Constitutional:           Skin:             Head:           Eyes:           Lymph:      ENT:           Neck:           Respiratory:            Cardiac:                                                           GI:           Extremities and Muscular Skeletal:                 Neurological:           Psych:         Recent Lab Results:  LIPID RESULTS:  Lab Results   Component Value Date    CHOL 184 04/05/2023    CHOL 162.0 03/17/2021    HDL 37 (L) 04/05/2023    HDL 32.0 (L) 03/17/2021     (H) 04/05/2023    LDL 76.0 03/17/2021    LDL 88 03/20/2018    TRIG 182 (H) 04/05/2023    TRIG 272.0 (H) 03/17/2021    CHOLHDLRATIO 5.1 (H) 03/17/2021       LIVER ENZYME RESULTS:  Lab Results   Component Value Date    AST 40.0 03/17/2021    ALT 48.0 03/17/2021       CBC RESULTS:  Lab Results   Component Value Date    WBC 9.6 04/05/2023    WBC 6.6 03/08/2019    RBC 5.21 04/05/2023    RBC 5.18 03/08/2019    HGB 16.3 04/05/2023    HGB 16.0 03/17/2021    HCT 47.9 04/05/2023    HCT 47.0 03/17/2021    MCV 92 04/05/2023    MCV 90.7 03/17/2021    MCH 31.3 04/05/2023    MCH 30.9 03/17/2021    MCHC 34.0 04/05/2023    MCHC 34.0 03/17/2021    RDW 13.2 04/05/2023    RDW 13.1 03/17/2021     04/05/2023     03/08/2019       BMP RESULTS:  Lab Results   Component Value Date     01/15/2024    .0 03/17/2021    POTASSIUM 4.2 01/15/2024    POTASSIUM 4.3 08/31/2021    POTASSIUM 4.0 03/17/2021    CHLORIDE 103 01/15/2024    CHLORIDE " "106.0 03/17/2021    CO2 25 01/15/2024    CO2 28.0 03/17/2021    ANIONGAP 13 01/15/2024    ANIONGAP 11 05/24/2015    GLC 74 01/15/2024     (H) 09/01/2021    .0 (H) 03/17/2021    BUN 14.1 01/15/2024    BUN 10.0 03/17/2021    CR 1.08 01/15/2024    CR 0.9 03/17/2021    GFRESTIMATED 90 01/15/2024    GFRESTIMATED >90  Non  GFR Calc   05/24/2015    GFRESTBLACK >90   GFR Calc   05/24/2015    ISAIAH 9.6 01/15/2024    ISAIAH 9.6 03/17/2021        A1C RESULTS:  Lab Results   Component Value Date    A1C 5.2 03/17/2021       INR RESULTS:  No results found for: \"INR\"        CC  Referred Self, MD  No address on file  "

## 2024-06-06 ENCOUNTER — OFFICE VISIT (OUTPATIENT)
Dept: FAMILY MEDICINE | Facility: CLINIC | Age: 39
End: 2024-06-06
Payer: COMMERCIAL

## 2024-06-06 VITALS
WEIGHT: 182 LBS | HEART RATE: 81 BPM | BODY MASS INDEX: 26.11 KG/M2 | SYSTOLIC BLOOD PRESSURE: 105 MMHG | OXYGEN SATURATION: 99 % | DIASTOLIC BLOOD PRESSURE: 75 MMHG | TEMPERATURE: 98.2 F | RESPIRATION RATE: 15 BRPM

## 2024-06-06 DIAGNOSIS — I42.9 CARDIOMYOPATHY, UNSPECIFIED TYPE (H): ICD-10-CM

## 2024-06-06 DIAGNOSIS — S06.9X0S TRAUMATIC BRAIN INJURY, WITHOUT LOSS OF CONSCIOUSNESS, SEQUELA (H): ICD-10-CM

## 2024-06-06 DIAGNOSIS — R55 NEAR SYNCOPE: ICD-10-CM

## 2024-06-06 DIAGNOSIS — F07.81 POST CONCUSSIVE SYNDROME: Primary | ICD-10-CM

## 2024-06-06 DIAGNOSIS — R26.89 IMPAIRED GAIT AND MOBILITY: ICD-10-CM

## 2024-06-06 ASSESSMENT — ANXIETY QUESTIONNAIRES
6. BECOMING EASILY ANNOYED OR IRRITABLE: NEARLY EVERY DAY
4. TROUBLE RELAXING: NEARLY EVERY DAY
GAD7 TOTAL SCORE: 19
8. IF YOU CHECKED OFF ANY PROBLEMS, HOW DIFFICULT HAVE THESE MADE IT FOR YOU TO DO YOUR WORK, TAKE CARE OF THINGS AT HOME, OR GET ALONG WITH OTHER PEOPLE?: EXTREMELY DIFFICULT
3. WORRYING TOO MUCH ABOUT DIFFERENT THINGS: NEARLY EVERY DAY
1. FEELING NERVOUS, ANXIOUS, OR ON EDGE: NEARLY EVERY DAY
2. NOT BEING ABLE TO STOP OR CONTROL WORRYING: NEARLY EVERY DAY
GAD7 TOTAL SCORE: 19
7. FEELING AFRAID AS IF SOMETHING AWFUL MIGHT HAPPEN: NEARLY EVERY DAY
7. FEELING AFRAID AS IF SOMETHING AWFUL MIGHT HAPPEN: NEARLY EVERY DAY
IF YOU CHECKED OFF ANY PROBLEMS ON THIS QUESTIONNAIRE, HOW DIFFICULT HAVE THESE PROBLEMS MADE IT FOR YOU TO DO YOUR WORK, TAKE CARE OF THINGS AT HOME, OR GET ALONG WITH OTHER PEOPLE: EXTREMELY DIFFICULT
GAD7 TOTAL SCORE: 19
5. BEING SO RESTLESS THAT IT IS HARD TO SIT STILL: SEVERAL DAYS

## 2024-06-06 ASSESSMENT — PATIENT HEALTH QUESTIONNAIRE - PHQ9
SUM OF ALL RESPONSES TO PHQ QUESTIONS 1-9: 21
10. IF YOU CHECKED OFF ANY PROBLEMS, HOW DIFFICULT HAVE THESE PROBLEMS MADE IT FOR YOU TO DO YOUR WORK, TAKE CARE OF THINGS AT HOME, OR GET ALONG WITH OTHER PEOPLE: EXTREMELY DIFFICULT
SUM OF ALL RESPONSES TO PHQ QUESTIONS 1-9: 21
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
## 2024-06-09 ENCOUNTER — MYC MEDICAL ADVICE (OUTPATIENT)
Dept: FAMILY MEDICINE | Facility: CLINIC | Age: 39
End: 2024-06-09

## 2024-06-10 ENCOUNTER — HOSPITAL ENCOUNTER (OUTPATIENT)
Dept: CARDIOLOGY | Facility: CLINIC | Age: 39
Discharge: HOME | End: 2024-06-10
Attending: INTERNAL MEDICINE | Admitting: INTERNAL MEDICINE
Payer: COMMERCIAL

## 2024-06-10 ENCOUNTER — TELEPHONE (OUTPATIENT)
Dept: CARDIOLOGY | Facility: CLINIC | Age: 39
End: 2024-06-10

## 2024-06-10 DIAGNOSIS — I42.9 CARDIOMYOPATHY, UNSPECIFIED TYPE (H): ICD-10-CM

## 2024-06-10 DIAGNOSIS — I71.20 THORACIC AORTIC ANEURYSM WITHOUT RUPTURE, UNSPECIFIED PART (H): ICD-10-CM

## 2024-06-10 DIAGNOSIS — R55 NEAR SYNCOPE: ICD-10-CM

## 2024-06-10 LAB — LVEF ECHO: NORMAL

## 2024-06-10 PROCEDURE — 93306 TTE W/DOPPLER COMPLETE: CPT | Mod: 26 | Performed by: INTERNAL MEDICINE

## 2024-06-10 PROCEDURE — 93306 TTE W/DOPPLER COMPLETE: CPT

## 2024-06-10 NOTE — TELEPHONE ENCOUNTER
Echo results:  Interpretation Summary  Global and regional left ventricular function is normal with an EF of 55-60%.  Right ventricular function, chamber size, wall motion, and thickness are  normal.  Sinuses of Valsalva 4.7 cm.  Ascending aorta 4.1 cm.  The inferior vena cava is normal.  No pericardial effusion is present.  No significant changes noted    No follow up office visit presently. GONSALO Durham RN

## 2024-06-11 ENCOUNTER — MYC MEDICAL ADVICE (OUTPATIENT)
Dept: CARDIOLOGY | Facility: CLINIC | Age: 39
End: 2024-06-11
Payer: COMMERCIAL

## 2024-07-07 ENCOUNTER — HEALTH MAINTENANCE LETTER (OUTPATIENT)
Age: 39
End: 2024-07-07

## 2024-07-08 ENCOUNTER — MYC MEDICAL ADVICE (OUTPATIENT)
Dept: FAMILY MEDICINE | Facility: CLINIC | Age: 39
End: 2024-07-08

## 2024-07-26 ENCOUNTER — TELEPHONE (OUTPATIENT)
Dept: FAMILY MEDICINE | Facility: CLINIC | Age: 39
End: 2024-07-26

## 2024-07-26 DIAGNOSIS — S06.9X0S TRAUMATIC BRAIN INJURY, WITHOUT LOSS OF CONSCIOUSNESS, SEQUELA (H): ICD-10-CM

## 2024-07-26 DIAGNOSIS — F07.81 POST CONCUSSIVE SYNDROME: Primary | ICD-10-CM
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
## 2024-08-30 ENCOUNTER — OFFICE VISIT (OUTPATIENT)
Dept: FAMILY MEDICINE | Facility: CLINIC | Age: 39
End: 2024-08-30
Payer: COMMERCIAL

## 2024-08-30 VITALS
OXYGEN SATURATION: 93 % | SYSTOLIC BLOOD PRESSURE: 112 MMHG | DIASTOLIC BLOOD PRESSURE: 81 MMHG | HEIGHT: 70 IN | WEIGHT: 183 LBS | BODY MASS INDEX: 26.2 KG/M2 | HEART RATE: 89 BPM | TEMPERATURE: 97.3 F

## 2024-08-30 DIAGNOSIS — S06.9X0S TRAUMATIC BRAIN INJURY, WITHOUT LOSS OF CONSCIOUSNESS, SEQUELA (H): Primary | ICD-10-CM

## 2024-08-30 DIAGNOSIS — F07.81 POST CONCUSSIVE SYNDROME: ICD-10-CM

## 2024-08-30 DIAGNOSIS — G43.809 OTHER MIGRAINE WITHOUT STATUS MIGRAINOSUS, NOT INTRACTABLE: ICD-10-CM

## 2024-08-30 DIAGNOSIS — F43.10 PTSD (POST-TRAUMATIC STRESS DISORDER): ICD-10-CM

## 2024-08-30 RX ORDER — RIZATRIPTAN BENZOATE 5 MG/1
5 TABLET, ORALLY DISINTEGRATING ORAL
Qty: 12 TABLET | Refills: 1 | Status: SHIPPED | OUTPATIENT
Start: 2024-08-30

## 2024-08-30 ASSESSMENT — ANXIETY QUESTIONNAIRES
2. NOT BEING ABLE TO STOP OR CONTROL WORRYING: NEARLY EVERY DAY
1. FEELING NERVOUS, ANXIOUS, OR ON EDGE: NEARLY EVERY DAY
GAD7 TOTAL SCORE: 20
7. FEELING AFRAID AS IF SOMETHING AWFUL MIGHT HAPPEN: NEARLY EVERY DAY
8. IF YOU CHECKED OFF ANY PROBLEMS, HOW DIFFICULT HAVE THESE MADE IT FOR YOU TO DO YOUR WORK, TAKE CARE OF THINGS AT HOME, OR GET ALONG WITH OTHER PEOPLE?: EXTREMELY DIFFICULT
4. TROUBLE RELAXING: NEARLY EVERY DAY
3. WORRYING TOO MUCH ABOUT DIFFERENT THINGS: NEARLY EVERY DAY
5. BEING SO RESTLESS THAT IT IS HARD TO SIT STILL: MORE THAN HALF THE DAYS
7. FEELING AFRAID AS IF SOMETHING AWFUL MIGHT HAPPEN: NEARLY EVERY DAY
GAD7 TOTAL SCORE: 20
IF YOU CHECKED OFF ANY PROBLEMS ON THIS QUESTIONNAIRE, HOW DIFFICULT HAVE THESE PROBLEMS MADE IT FOR YOU TO DO YOUR WORK, TAKE CARE OF THINGS AT HOME, OR GET ALONG WITH OTHER PEOPLE: EXTREMELY DIFFICULT
6. BECOMING EASILY ANNOYED OR IRRITABLE: NEARLY EVERY DAY
GAD7 TOTAL SCORE: 20

## 2024-08-30 ASSESSMENT — PAIN SCALES - PAIN ENJOYMENT GENERAL ACTIVITY SCALE (PEG)
AVG_PAIN_PASTWEEK: 7
INTERFERED_ENJOYMENT_LIFE: 10 - COMPLETELY INTERFERES
INTERFERED_GENERAL_ACTIVITY: 10
AVG_PAIN_PASTWEEK: 7
INTERFERED_GENERAL_ACTIVITY: 10 - COMPLETELY INTERFERES
PEG_TOTALSCORE: 9
PEG_TOTALSCORE: 9
INTERFERED_ENJOYMENT_LIFE: 10

## 2024-08-30 ASSESSMENT — PATIENT HEALTH QUESTIONNAIRE - PHQ9
10. IF YOU CHECKED OFF ANY PROBLEMS, HOW DIFFICULT HAVE THESE PROBLEMS MADE IT FOR YOU TO DO YOUR WORK, TAKE CARE OF THINGS AT HOME, OR GET ALONG WITH OTHER PEOPLE: EXTREMELY DIFFICULT
SUM OF ALL RESPONSES TO PHQ QUESTIONS 1-9: 18
SUM OF ALL RESPONSES TO PHQ QUESTIONS 1-9: 18

## 2024-08-30 NOTE — PATIENT INSTRUCTIONS
Psychology today [place] Therapist    [email or web address]
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
## 2024-09-15 ENCOUNTER — MYC REFILL (OUTPATIENT)
Dept: CARDIOLOGY | Facility: CLINIC | Age: 39
End: 2024-09-15
Payer: COMMERCIAL

## 2024-09-15 DIAGNOSIS — I77.810 ASCENDING AORTA DILATATION (H): ICD-10-CM

## 2024-09-15 DIAGNOSIS — R00.2 PALPITATIONS: ICD-10-CM

## 2024-09-15 DIAGNOSIS — I42.9 CARDIOMYOPATHY, UNSPECIFIED TYPE (H): ICD-10-CM

## 2024-09-15 DIAGNOSIS — R00.0 SINUS TACHYCARDIA: ICD-10-CM

## 2024-09-16 DIAGNOSIS — R00.0 SINUS TACHYCARDIA: Primary | ICD-10-CM

## 2024-09-16 RX ORDER — BISOPROLOL FUMARATE 5 MG/1
2.5 TABLET, FILM COATED ORAL DAILY
Status: SHIPPED | DISCHARGE
Start: 2024-09-16

## 2024-09-16 RX ORDER — BISOPROLOL FUMARATE 5 MG/1
TABLET, FILM COATED ORAL
Qty: 45 TABLET | Refills: 1 | Status: SHIPPED | OUTPATIENT
Start: 2024-09-16

## 2024-11-08 ENCOUNTER — OFFICE VISIT (OUTPATIENT)
Dept: PHYSICAL MEDICINE AND REHAB | Facility: CLINIC | Age: 39
End: 2024-11-08
Attending: INTERNAL MEDICINE
Payer: COMMERCIAL

## 2024-11-08 VITALS — OXYGEN SATURATION: 96 % | HEART RATE: 101 BPM | DIASTOLIC BLOOD PRESSURE: 80 MMHG | SYSTOLIC BLOOD PRESSURE: 111 MMHG

## 2024-11-08 DIAGNOSIS — G43.E01 CHRONIC MIGRAINE WITH AURA AND WITH STATUS MIGRAINOSUS, NOT INTRACTABLE: ICD-10-CM

## 2024-11-08 DIAGNOSIS — F07.81 POST CONCUSSIVE SYNDROME: ICD-10-CM

## 2024-11-08 DIAGNOSIS — G44.329 CHRONIC POST-TRAUMATIC HEADACHE, NOT INTRACTABLE: Primary | ICD-10-CM

## 2024-11-08 DIAGNOSIS — S06.9X0S TRAUMATIC BRAIN INJURY, WITHOUT LOSS OF CONSCIOUSNESS, SEQUELA (H): ICD-10-CM

## 2024-11-08 PROBLEM — H52.4 ACCOMMODATIVE INSUFFICIENCY: Status: ACTIVE | Noted: 2022-10-07

## 2024-11-08 PROBLEM — H53.143 PHOTOPHOBIA OF BOTH EYES: Status: ACTIVE | Noted: 2022-10-07

## 2024-11-08 PROBLEM — H55.81 SACCADIC EYE MOVEMENT DEFICIENCY: Status: ACTIVE | Noted: 2022-10-07

## 2024-11-08 PROBLEM — H55.82 DEFICIENT SMOOTH PURSUIT EYE MOVEMENTS: Status: ACTIVE | Noted: 2022-10-07

## 2024-11-08 PROBLEM — R68.89 SPELLS OF DECREASED ATTENTIVENESS: Status: ACTIVE | Noted: 2022-08-23

## 2024-11-08 PROBLEM — H47.9 DISORDER OF VISUAL PATHWAY: Status: ACTIVE | Noted: 2022-10-07

## 2024-11-08 PROBLEM — M54.81 BILATERAL OCCIPITAL NEURALGIA: Status: ACTIVE | Noted: 2022-11-02

## 2024-11-08 PROBLEM — S06.9XAA MILD TRAUMATIC BRAIN INJURY (H): Status: ACTIVE | Noted: 2022-09-27

## 2024-11-08 PROBLEM — H53.30 BINOCULAR VISION DISORDER: Status: ACTIVE | Noted: 2022-08-23

## 2024-11-08 PROBLEM — G47.00 INSOMNIA: Status: ACTIVE | Noted: 2020-08-20

## 2024-11-08 PROBLEM — Z99.89 CPAP (CONTINUOUS POSITIVE AIRWAY PRESSURE) DEPENDENCE: Status: ACTIVE | Noted: 2020-10-22

## 2024-11-08 PROBLEM — H57.13 PAIN AROUND BOTH EYES: Status: ACTIVE | Noted: 2022-10-07

## 2024-11-08 PROBLEM — R10.11 RUQ ABDOMINAL PAIN: Status: ACTIVE | Noted: 2019-12-13

## 2024-11-08 RX ORDER — ATENOLOL 25 MG/1
1 TABLET ORAL DAILY
COMMUNITY
Start: 2024-03-12

## 2024-11-08 ASSESSMENT — ANXIETY QUESTIONNAIRES: GAD7 TOTAL SCORE: INCOMPLETE
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
## 2024-11-11 ENCOUNTER — PATIENT OUTREACH (OUTPATIENT)
Dept: CARE COORDINATION | Facility: CLINIC | Age: 39
End: 2024-11-11
Payer: COMMERCIAL

## 2024-11-12 ENCOUNTER — OFFICE VISIT (OUTPATIENT)
Dept: CARDIOLOGY | Facility: CLINIC | Age: 39
End: 2024-11-12
Payer: COMMERCIAL

## 2024-11-12 VITALS
HEART RATE: 102 BPM | SYSTOLIC BLOOD PRESSURE: 121 MMHG | BODY MASS INDEX: 27.2 KG/M2 | HEIGHT: 70 IN | WEIGHT: 190 LBS | DIASTOLIC BLOOD PRESSURE: 88 MMHG

## 2024-11-12 DIAGNOSIS — Q25.43 AORTIC ROOT ANEURYSM: Primary | ICD-10-CM

## 2024-11-12 DIAGNOSIS — R55 NEAR SYNCOPE: ICD-10-CM

## 2024-11-12 DIAGNOSIS — I42.9 CARDIOMYOPATHY, UNSPECIFIED TYPE (H): ICD-10-CM

## 2024-11-12 DIAGNOSIS — R00.0 TACHYCARDIA: ICD-10-CM
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
## 2024-11-13 ENCOUNTER — PATIENT OUTREACH (OUTPATIENT)
Dept: CARE COORDINATION | Facility: CLINIC | Age: 39
End: 2024-11-13
Payer: COMMERCIAL
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
## 2024-11-15 ENCOUNTER — THERAPY VISIT (OUTPATIENT)
Dept: PHYSICAL THERAPY | Facility: CLINIC | Age: 39
End: 2024-11-15
Attending: PHYSICAL MEDICINE & REHABILITATION
Payer: COMMERCIAL

## 2024-11-15 DIAGNOSIS — R42 DIZZINESS: Primary | ICD-10-CM

## 2024-11-15 DIAGNOSIS — R26.81 UNSTEADINESS: ICD-10-CM

## 2024-11-15 DIAGNOSIS — M54.2 CERVICALGIA: ICD-10-CM

## 2024-11-15 DIAGNOSIS — F07.81 POST CONCUSSIVE SYNDROME: ICD-10-CM

## 2024-11-15 PROCEDURE — 97162 PT EVAL MOD COMPLEX 30 MIN: CPT | Mod: GP | Performed by: PHYSICAL THERAPIST

## 2024-11-15 PROCEDURE — 95992 CANALITH REPOSITIONING PROC: CPT | Mod: GP | Performed by: PHYSICAL THERAPIST
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
## 2024-11-26 ENCOUNTER — TELEPHONE (OUTPATIENT)
Dept: OPHTHALMOLOGY | Facility: CLINIC | Age: 39
End: 2024-11-26
Payer: COMMERCIAL

## 2024-11-26 NOTE — TELEPHONE ENCOUNTER
Spoke with patient regarding confirming appointment for 11/27/24. Patient is aware of date, time and location.-Per Patient

## 2024-11-27 ENCOUNTER — OFFICE VISIT (OUTPATIENT)
Dept: OPHTHALMOLOGY | Facility: CLINIC | Age: 39
End: 2024-11-27
Attending: PHYSICAL MEDICINE & REHABILITATION
Payer: COMMERCIAL

## 2024-11-27 DIAGNOSIS — F07.81 POST CONCUSSIVE SYNDROME: Primary | ICD-10-CM

## 2024-11-27 DIAGNOSIS — H52.4 ACCOMMODATIVE INSUFFICIENCY: ICD-10-CM

## 2024-11-27 DIAGNOSIS — H53.143 PHOTOPHOBIA OF BOTH EYES: ICD-10-CM

## 2024-11-27 ASSESSMENT — REFRACTION_MANIFEST
OS_SPHERE: PLANO
OD_SPHERE: -0.50
OD_AXIS: 090
OS_AXIS: 090
OS_CYLINDER: +0.50
OS_SPHERE: -0.50
OD_SPHERE: PLANO
OD_CYLINDER: +0.75

## 2024-11-27 ASSESSMENT — EXTERNAL EXAM - LEFT EYE: OS_EXAM: NORMAL

## 2024-11-27 ASSESSMENT — CONF VISUAL FIELD
OS_SUPERIOR_NASAL_RESTRICTION: 0
OD_SUPERIOR_NASAL_RESTRICTION: 0
OS_INFERIOR_NASAL_RESTRICTION: 0
OS_SUPERIOR_TEMPORAL_RESTRICTION: 0
OD_NORMAL: 1
OS_INFERIOR_TEMPORAL_RESTRICTION: 0
METHOD: COUNTING FINGERS
OD_SUPERIOR_TEMPORAL_RESTRICTION: 0
OS_NORMAL: 1
OD_INFERIOR_TEMPORAL_RESTRICTION: 0
OD_INFERIOR_NASAL_RESTRICTION: 0

## 2024-11-27 ASSESSMENT — TONOMETRY
IOP_METHOD: ICARE
OS_IOP_MMHG: 14
OD_IOP_MMHG: 13

## 2024-11-27 ASSESSMENT — SLIT LAMP EXAM - LIDS
COMMENTS: NORMAL
COMMENTS: NORMAL

## 2024-11-27 ASSESSMENT — CUP TO DISC RATIO
OS_RATIO: 0.4
OD_RATIO: 0.4

## 2024-11-27 ASSESSMENT — VISUAL ACUITY
OS_SC: 20/20
METHOD_MR_RETINOSCOPY: 1
METHOD: SNELLEN - LINEAR
OD_SC: 20/20

## 2024-11-27 ASSESSMENT — EXTERNAL EXAM - RIGHT EYE: OD_EXAM: NORMAL
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
## 2024-12-05 ENCOUNTER — THERAPY VISIT (OUTPATIENT)
Dept: PHYSICAL THERAPY | Facility: CLINIC | Age: 39
End: 2024-12-05
Payer: COMMERCIAL

## 2024-12-05 DIAGNOSIS — M54.2 CERVICALGIA: ICD-10-CM

## 2024-12-05 DIAGNOSIS — R42 DIZZINESS: ICD-10-CM

## 2024-12-05 DIAGNOSIS — R26.81 UNSTEADINESS: ICD-10-CM

## 2024-12-05 DIAGNOSIS — F07.81 POST CONCUSSIVE SYNDROME: Primary | ICD-10-CM

## 2024-12-09 DIAGNOSIS — F07.81 POST CONCUSSIVE SYNDROME: ICD-10-CM

## 2024-12-11 RX ORDER — AMOXICILLIN 500 MG
1 CAPSULE ORAL AT BEDTIME
Qty: 31 CAPSULE | Refills: 11 | Status: SHIPPED | OUTPATIENT
Start: 2024-12-11

## 2024-12-11 RX ORDER — CHOLECALCIFEROL (VITAMIN D3) 25 MCG
CAPSULE ORAL
Qty: 62 CAPSULE | Refills: 11 | Status: SHIPPED | OUTPATIENT
Start: 2024-12-11

## 2024-12-11 NOTE — TELEPHONE ENCOUNTER
Medication requested: Vitamin D, Cholecalciferol, 25 MCG (1000 UT) CAPS & Omega-3 Fatty Acids (FISH OIL) 1200 MG capsule   Last office visit: 8/30/2024  [facility] appointments: 12/11/2024  Medication last refilled: 12/13/2023; 90 caps + 3 refills    Prescription approved per [facility] Refill Protocol.    MAGDA Layton, RN  12/11/24, 8:57 AM

## 2024-12-16 ENCOUNTER — THERAPY VISIT (OUTPATIENT)
Dept: PHYSICAL THERAPY | Facility: CLINIC | Age: 39
End: 2024-12-16
Payer: COMMERCIAL

## 2024-12-16 DIAGNOSIS — R26.81 UNSTEADINESS: ICD-10-CM

## 2024-12-16 DIAGNOSIS — R42 DIZZINESS: ICD-10-CM

## 2024-12-16 DIAGNOSIS — M54.2 CERVICALGIA: ICD-10-CM

## 2024-12-16 DIAGNOSIS — F07.81 POST CONCUSSIVE SYNDROME: Primary | ICD-10-CM

## 2024-12-16 PROCEDURE — 97112 NEUROMUSCULAR REEDUCATION: CPT | Mod: GP | Performed by: PHYSICAL THERAPIST

## 2024-12-16 NOTE — PATIENT INSTRUCTIONS
Physical Therapy Exercise 12/16/24  -Try to shoot for 15-20 minutes of continuous cardiovascular activity each day.      1. )  a corner for safety with either your walker or a sturdy chair in front of you.  Try not t touch anything for balance.  a.) Stand with eyes open for 60 seconds feet as close together as you can without losing your balance for the whole 60 seconds  b.) Stand with feet hip width apart and eyes closed for 60 seconds.    2.) Towel Step overs.   Place a rolled-up hand towel by your counter-top.    Step over the towel with your right leg hitting the ground with your heel first then letting your toes fall to the ground.  Step over the towel with your left leg. Step backwards over the towel with your right foot. Bring your left foot back over the towel.     Repeat this 10 times  Step over the towel with your left leg hitting the ground with your heel first then letting your toes fall to the ground.  Bring your right foot over the towel the same way. Step backwards over the towel with your left foot.  Bring your right foot over the towel,  Repeat this 10 times    3.) Seated head movements- Sit at the edge of your chair.  Keep your eyes open as you move your head  a.) look left/right 5-15 times  b.) look up/down 5-15 times

## 2024-12-23 ENCOUNTER — THERAPY VISIT (OUTPATIENT)
Dept: PHYSICAL THERAPY | Facility: CLINIC | Age: 39
End: 2024-12-23
Payer: COMMERCIAL

## 2024-12-23 DIAGNOSIS — R42 DIZZINESS: Primary | ICD-10-CM

## 2024-12-23 DIAGNOSIS — R26.81 UNSTEADINESS: ICD-10-CM

## 2024-12-23 DIAGNOSIS — F07.81 POST CONCUSSIVE SYNDROME: ICD-10-CM

## 2024-12-23 DIAGNOSIS — M54.2 CERVICALGIA: ICD-10-CM

## 2024-12-23 PROCEDURE — 97112 NEUROMUSCULAR REEDUCATION: CPT | Mod: GP | Performed by: PHYSICAL THERAPIST

## 2024-12-23 NOTE — PATIENT INSTRUCTIONS
Physical Therapy Exercise 12/23/24  -Try to shoot for 15-20 minutes of continuous cardiovascular activity each day.       1. )  a corner for safety with either your walker or a sturdy chair in front of you.  Try not to touch anything for balance.  a.) Stand with eyes open for 60 seconds feet together as you can without losing your balance for the whole 60 seconds  b.) Stand with feet hip width apart and eyes closed for 60 seconds.     2.) Towel Step overs.   Place a rolled-up hand towel by your counter-top.    Step over the towel with your right leg hitting the ground with your heel first then letting your toes fall to the ground.  Step over the towel with your left leg. Step backwards over the towel with your right foot. Bring your left foot back over the towel.     Repeat this 10 times  Step over the towel with your left leg hitting the ground with your heel first then letting your toes fall to the ground.  Bring your right foot over the towel the same way. Step backwards over the towel with your left foot.  Bring your right foot over the towel,  Repeat this 10 times     3.) Head movements-  a corner with a sturdy chair in front and feet hip width apart.  Keep your eyes open as you move your head  a.) look left/right 5-15 times  b.) look up/down 5-15 times

## 2024-12-30 ENCOUNTER — THERAPY VISIT (OUTPATIENT)
Dept: PHYSICAL THERAPY | Facility: CLINIC | Age: 39
End: 2024-12-30
Payer: COMMERCIAL

## 2024-12-30 DIAGNOSIS — F07.81 POST CONCUSSIVE SYNDROME: ICD-10-CM

## 2024-12-30 DIAGNOSIS — M54.2 CERVICALGIA: ICD-10-CM

## 2024-12-30 DIAGNOSIS — R42 DIZZINESS: Primary | ICD-10-CM

## 2024-12-30 DIAGNOSIS — R26.81 UNSTEADINESS: ICD-10-CM

## 2024-12-30 PROCEDURE — 97112 NEUROMUSCULAR REEDUCATION: CPT | Mod: GP | Performed by: PHYSICAL THERAPIST

## 2024-12-30 PROCEDURE — 97116 GAIT TRAINING THERAPY: CPT | Mod: GP | Performed by: PHYSICAL THERAPIST

## 2024-12-30 NOTE — PATIENT INSTRUCTIONS
Physical Therapy Exercise 12/30/24  -Try to shoot for 15-20 minutes of continuous cardiovascular activity each day.       1. )  a corner for safety with either your walker or a sturdy chair in front of you.  Try not to touch anything for balance.  a.) Stand with eyes closed for 60 seconds and feet together      2.) Walking intervals  Walk forward marching your knees up for 10ft  Walk backwards for 10ft  Repeat this 3 times.  Try not to touch for balance unless you REALLY need to    3.) Head movements-  a corner with a sturdy chair in front and feet hip width apart.  Keep your eyes open as you move your head  a.) look left/right 5-15 times  b.) look up/down 5-15 times

## 2025-01-09 ENCOUNTER — OFFICE VISIT (OUTPATIENT)
Dept: ALLERGY | Facility: CLINIC | Age: 40
End: 2025-01-09
Attending: INTERNAL MEDICINE
Payer: COMMERCIAL

## 2025-01-09 VITALS
SYSTOLIC BLOOD PRESSURE: 118 MMHG | HEART RATE: 100 BPM | HEIGHT: 70 IN | WEIGHT: 183 LBS | OXYGEN SATURATION: 95 % | DIASTOLIC BLOOD PRESSURE: 87 MMHG | BODY MASS INDEX: 26.2 KG/M2

## 2025-01-09 DIAGNOSIS — R06.2 WHEEZING: ICD-10-CM

## 2025-01-09 DIAGNOSIS — R06.02 SHORTNESS OF BREATH: Primary | ICD-10-CM

## 2025-01-09 DIAGNOSIS — J30.1 SEASONAL ALLERGIC RHINITIS DUE TO POLLEN: ICD-10-CM

## 2025-01-09 DIAGNOSIS — Z88.9 H/O MULTIPLE ALLERGIES: ICD-10-CM

## 2025-01-09 RX ORDER — BUDESONIDE AND FORMOTEROL FUMARATE DIHYDRATE 160; 4.5 UG/1; UG/1
2 AEROSOL RESPIRATORY (INHALATION) 2 TIMES DAILY
Qty: 10.2 G | Refills: 3 | Status: SHIPPED | OUTPATIENT
Start: 2025-01-09

## 2025-01-09 NOTE — PATIENT INSTRUCTIONS
[facility] RADIATION ONCOLOGY  [address] [address]  Dept Phone: [phone]    Radiation Oncology On Treatment Visit Note    Patient Name:  Stephanie Escobar  YOB: 1957  MRN:  [id number]  Dictating Physician:  Dennise Dudley MD    Diagnosis:  Ductal carcinoma in situ (DCIS) of right breast D05.11, Merkel cell cancer (CMD) C4A.9, Merkel cell tumor (CMD) C4A.9    Cancer Staging   Ductal carcinoma in situ (DCIS) of right breast  Staging form: Breast, AJCC 8th Edition  - Pathologic stage from 10/25/2021: Stage 0 (pTis (DCIS), pN0, cM0, ER+, IL+, HER2: Unknown) - Signed by Dennise Dudley MD on 10/25/2021    Merkel cell cancer (CMD)  Staging form: Merkel Cell Carcinoma, AJCC 8th Edition  - Pathologic stage from 8/11/2023: Stage IIIB (pT1, pN1b, cM0) - Signed by Dennise Dudley MD on 10/18/2023      Radiation Treatments       Active   No active radiation treatments to show.     Historical   Right_breast supine (Started on 12/27/2021)   Most recent fraction: 266 cGy given on 1/18/2022   Total given: 4,256 cGy / 4,256 cGy  (16 of 16 fractions)   Elapsed Days: 22   Technique: 3-FIELD        Right_tumorBedboost (Started on 1/19/2022)   Most recent fraction: 250 cGy given on 1/24/2022   Total given: 1,000 cGy / 1,000 cGy  (4 of 4 fractions)   Elapsed Days: 5   Technique: ENFACE        SIB_R_groin and pelv (Started on 3/20/2024)   Most recent fraction: 230 cGy given on 4/24/2024   Total given: 5,750 cGy / 5,750 cGy  (25 of 25 fractions)   Elapsed Days: 35   Technique: VMAT                   Completed RT today      There were no vitals filed for this visit.         Physical Exam  KPS 70  GEN: NAD  G2 dermatitis      Image / Setup Review:  Chart, Dosimetry, Images, and Setup Reviewed.    Impression / Plan: tolerated RT, completed today  RTC in 2 weeks for skin check        Plan for Pain: Continue current OTC pain management and Continue RT as prescribed    Radiation  Symbicort 2 puffs twice daily x 2 weeks, then may use every 6 hours as needed (instead of Albuterol). Rinse mouth afterwards.  Breathing tests soon.          Allergy Staff Appt Hours Shot Hours Location       Physician   Alberto Kauffman MD      Support Staff   JEOVANY Yeager RN, MA Emily J., MA      Mondays Tuesdays Thursdays and Fridays:      aSra 7-5      Wednesdays         Close                Mondays, Tuesdays and Fridays:  7:20 - 3:40              [facility]  [address]  [place] [address]  Allergy appointment  line: [phone]    Pulmonary Function Scheduling:  [facility]: [phone]           Questions about cost of your care  For questions about your cost of your visit, procedure, lab or imaging contact: [email or web address] Price Line [phone] or visit:  [email or web address]    Prescription Assistance  If you need assistance with your prescriptions (cost, coverage, etc) please contact: [employer] Prescription Assistance Program [phone]    Important Scheduling Information  All visits for food challenges, medication/drug allergy testing, and drug challenges MUST be scheduled through the allergy clinic nurse. Please contact them via [employer]works or by calling the clinic at [phone] and asking to speak with an allergy nurse. They will provide additional information and instructions for the appointment. Discontinue oral antihistamines 7 days prior to the appointment. Discontinue nasal and ocular antihistamines 1 day prior to the appointment.    Appointments for skin testing: Appointment will last approximately 45 minutes.  Please call the appointment line for your clinic to schedule.  Discontinue oral antihistamines 7 days prior to the appointment.  Discontinue nasal and ocular antihistamines 1 days prior to appointment.    Thank you for trusting us with your care. Please feel free to contact us with any  Therapy Visit    Symptoms within expected range  Radiation dose schedule reviewed and remains acceptable  Dosimetry plan remains acceptable  Radiation technique remains acceptable  Films reviewed and remain acceptable  Patient setup reviewed and remains acceptable  Pain assessed  Pain management planned  Continue radiation therapy       questions or concerns you may have.

## 2025-01-09 NOTE — LETTER
1/9/2025      Juarez Jauregui  [address]  [facility] [address]      Dear Colleague,    Thank you for referring your patient, Juarez Jauregui, to the [facility] [place]. Please see a copy of my visit note below.    Juarez Jauregui was seen in the Allergy Clinic at [facility].    Juarez Jauregui is a 39 year old male being seen today for ongoing evaluation of Shortness of breath.     Since the last visit the patient has been having shortness of breath, wheezing and chest tightness.     He has a history of allergic rhinitis and did receive allergy shots in the past, which were effective.  He saw Dr. Fiore in 2022 after completing allergy shots at a different location from approximately 2690-0080.  He has had a septorhinoplasty in the past.  Skin testing to shellfish was negative at the time.    Over the last couple months, while his building where he lives is being renovated, he has had significant increase in symptoms consistent with asthma.  This includes shortness of breath as well as chest tightness and wheezing.  He feels a closing sensation in his upper chest region.  Currently has to walk through the smoking area and there is significant construction debris around and he feels like these exposures have increased his asthma symptoms.  He is using albuterol every other day with great results within 3 to 5 minutes.  He is wondering if he should have any other therapies.      He does take Benadryl at night in the past for night sweats.  His father does have a history of asthma.  He is not having any symptoms at night when sleeping such as shortness breath or cough or wheezing.    Past Medical History:   Diagnosis Date     Motion sickness      Obstructive sleep apnea syndrome      WILBER (obstructive sleep apnea)      PONV (postoperative nausea and vomiting)      Prostatitis      Family History   Problem Relation Age of Onset     Pre-Diabetes Mother       Hyperthyroidism Mother      Endometrial Cancer Mother         surgery     Other - See Comments Father         unknown     Asthma Father      Other - See Comments Sister         hysterectomy after 4th child     Parkinsonism Maternal Grandmother      Heart Failure Maternal Grandmother          age 80     Arrhythmia Maternal Grandmother         on blood thinners     Cancer Maternal Grandmother         TOB, ? esophageal     Dementia Maternal Grandfather      Prostate Cancer Maternal Grandfather      Colon Cancer Maternal Grandfather 84     Dementia Paternal Grandfather      Hypothyroidism Maternal Uncle      Glaucoma No family hx of      Macular Degeneration No family hx of      Past Surgical History:   Procedure Laterality Date     COLONOSCOPY  10/2007     DENTAL SURGERY       LASIK Bilateral 2017     [place] REPAIR OF BICEPS TENDON AT ELBOW Left 2019     ROTATOR CUFF REPAIR RT/LT Left 2019     SEPTORHINOPLASTY N/A 2021    Procedure: Septorhinoplasty, Cosmetic Dorsal Hump Reduction;  Surgeon: Yohana Coulter MD;  Location: [facility] OR         Current Outpatient Medications:      albuterol (PROAIR HFA/PROVENTIL HFA/VENTOLIN HFA) 108 (90 Base) MCG/ACT inhaler, Inhale 2 puffs into the lungs every 4 hours as needed for shortness of breath or wheezing., Disp: 18 g, Rfl: 2     atenolol (TENORMIN) 25 MG tablet, Take 1 tablet by mouth daily., Disp: , Rfl:      bisoprolol (ZEBETA) 5 MG tablet, 5 mg tablet , Pt taking 2.5 mg PO daily, Disp: 45 tablet, Rfl: 1     bisoprolol (ZEBETA) 5 MG tablet, Take 0.5 tablets (2.5 mg) by mouth daily., Disp: , Rfl:      budesonide-formoterol (SYMBICORT) 160-4.5 MCG/ACT Inhaler, Inhale 2 puffs into the lungs 2 times daily., Disp: 10.2 g, Rfl: 3     Cholecalciferol (D3 HIGH POTENCY) 25 MCG (1000 UT) CAPS, TAKE 2 CAPSULES (2000IU) BY MOUTH ONCE DAILY, Disp: 62 capsule, Rfl: 11     DULoxetine (CYMBALTA) 60 MG capsule, 120 mg at bedtime, Disp: 90 capsule, Rfl: 0     galcanezumab-gnlm  (EMGALITY) 120 MG/ML injection, Inject 120 mg Subcutaneous every 28 days, Disp: , Rfl:      medical cannabis (Patient's own supply), (The purpose of this order is to document that the patient reports taking medical cannabis.  This is not a prescription, and is not used to certify that the patient has a qualifying medical condition.),  Capsules and vaping once daily, Disp: 0 Information only, Rfl: 0     midodrine (PROAMATINE) 5 MG tablet, 1 tablet in AM and 1 tablet at dinner time, Disp: 60 tablet, Rfl: 3     multivitamin w/minerals (THERA-VIT-M) tablet, Take 1 tablet by mouth daily, Disp: 90 tablet, Rfl: 3     Omega-3 Fatty Acids (FISH OIL) 1200 MG capsule, TAKE 1 CAPSULE BY MOUTH AT BEDTIME, Disp: 31 capsule, Rfl: 11     OXcarbazepine (TRILEPTAL) 150 MG tablet, Take 150 mg by mouth, Disp: , Rfl:      Risankizumab-rzaa (SKYRIZI) 150 MG/ML subcutaneous, Inject 1ml SC x 1 on week 0, 4, then q12 weeks., Disp: , Rfl:      rizatriptan (MAXALT) 5 MG tablet, at onset of headache, Disp: , Rfl:      rizatriptan (MAXALT-MLT) 5 MG ODT, Take 1 tablet (5 mg) by mouth at onset of headache for migraine. May repeat in 2 hours. Max 6 tablets/24 hours., Disp: 12 tablet, Rfl: 1     spacer (OPTICHAMBER NAHID) holding chamber, Use with albuterol inhaler, Disp: , Rfl:      study - emtricitabine-tenofovir 200-300, IDS#[id number], (TRUVADA) per tablet, Take 1 tablet by mouth daily, Disp: 30 tablet, Rfl:     Current Facility-Administered Medications:      Botulinum Toxin Type A (BOTOX) 200 units injection 195 Units, 195 Units, Intramuscular, Q90 Days,   Allergies   Allergen Reactions     Metoprolol Other (See Comments)     nightmares     Miconazole Other (See Comments)     Adhesive Tape Rash     Topical creams: neosporin and bacitracin     Bacitracin-Polymyxin B GI Disturbance, Rash, Dermatitis, Nausea and Other (See Comments)     Covid-19 (Mrna) Vaccine Rash     On the booster vaccine.    On the booster vaccine.   On the booster vaccine.   On  "the booster vaccine.    On the booster vaccine.   On the booster vaccine.     Neomycin-Bacitracin Zn-Polymyx Rash         EXAM:   /87   Pulse 100   Ht 1.778 m (5' 10\")   Wt 83 kg (183 lb)   SpO2 95%   BMI 26.26 kg/m      Constitutional:       General: He is not in acute distress.     Appearance: Normal appearance. He is not ill-appearing.   HENT:      Head: Normocephalic and atraumatic.      Nose: Nose normal. No congestion or rhinorrhea.      Mouth/Throat:      Mouth: Mucous membranes are moist.      Pharynx: Oropharynx is clear. No posterior oropharyngeal erythema.   Eyes:      General:         Right eye: No discharge.         Left eye: No discharge.   Cardiovascular:      Rate and Rhythm: Normal rate and regular rhythm.      Heart sounds: Normal heart sounds.   Pulmonary:      Effort: Pulmonary effort is normal.      Breath sounds: Normal breath sounds. No wheezing or rhonchi.   Skin:     General: Skin is warm.      Findings: No erythema or rash.   Neurological:      General: No focal deficit present.      Mental Status: He is alert. Mental status is at baseline.   Psychiatric:         Mood and Affect: Mood normal.         Behavior: Behavior normal.        ASSESSMENT/PLAN:  Juarez Jauregui is a 39 year old male seen today for evaluation of possible asthma.  Having symptoms consistent with asthma with shortness of breath and chest tightness and wheezing responding to albuterol within the appropriate timeframe.  He has allergic rhinitis and has received allergy shots.    Will order lung function testing to further evaluate and prescribe Symbicort.    Symbicort 2 puffs twice daily x 2 weeks, then may use every 6 hours as needed (instead of Albuterol). Rinse mouth afterwards.  Breathing tests soon.      Follow-up to be determined      Thank you for allowing me to participate in the care of Juarez Jauregui.      I spent 35 minutes on the date of the encounter doing chart review, history and exam, " documentation and further coordination as noted above exclusive of separately reported interpretations    Alberto Kauffman MD  Allergy/Immunology  [facility]      Again, thank you for allowing me to participate in the care of your patient.        Sincerely,        Alberto Kauffman MD    Electronically signed

## 2025-01-09 NOTE — PROGRESS NOTES
Juarez Jauregui was seen in the Allergy Clinic at [facility].    Juarez Jauregui is a 39 year old male being seen today for ongoing evaluation of Shortness of breath.     Since the last visit the patient has been having shortness of breath, wheezing and chest tightness.     He has a history of allergic rhinitis and did receive allergy shots in the past, which were effective.  He saw Dr. Fiore in 2022 after completing allergy shots at a different location from approximately [id number].  He has had a septorhinoplasty in the past.  Skin testing to shellfish was negative at the time.    Over the last couple months, while his building where he lives is being renovated, he has had significant increase in symptoms consistent with asthma.  This includes shortness of breath as well as chest tightness and wheezing.  He feels a closing sensation in his upper chest region.  Currently has to walk through the smoking area and there is significant construction debris around and he feels like these exposures have increased his asthma symptoms.  He is using albuterol every other day with great results within 3 to 5 minutes.  He is wondering if he should have any other therapies.      He does take Benadryl at night in the past for night sweats.  His father does have a history of asthma.  He is not having any symptoms at night when sleeping such as shortness breath or cough or wheezing.    Past Medical History:   Diagnosis Date    Motion sickness     Obstructive sleep apnea syndrome     WILBER (obstructive sleep apnea)     PONV (postoperative nausea and vomiting)     Prostatitis      Family History   Problem Relation Age of Onset    Pre-Diabetes Mother     Hyperthyroidism Mother     Endometrial Cancer Mother         surgery    Other - See Comments Father         unknown    Asthma Father     Other - See Comments Sister         hysterectomy after 4th child    Parkinsonism Maternal Grandmother     Heart Failure Maternal  Grandmother          age 80    Arrhythmia Maternal Grandmother         on blood thinners    Cancer Maternal Grandmother         TOB, ? esophageal    Dementia Maternal Grandfather     Prostate Cancer Maternal Grandfather     Colon Cancer Maternal Grandfather 84    Dementia Paternal Grandfather     Hypothyroidism Maternal Uncle     Glaucoma No family hx of     Macular Degeneration No family hx of      Past Surgical History:   Procedure Laterality Date    COLONOSCOPY  10/2007    DENTAL SURGERY      LASIK Bilateral 2017    [place] REPAIR OF BICEPS TENDON AT ELBOW Left 2019    ROTATOR CUFF REPAIR RT/LT Left 2019    SEPTORHINOPLASTY N/A 2021    Procedure: Septorhinoplasty, Cosmetic Dorsal Hump Reduction;  Surgeon: Yohana Coulter MD;  Location: [facility] OR         Current Outpatient Medications:     albuterol (PROAIR HFA/PROVENTIL HFA/VENTOLIN HFA) 108 (90 Base) MCG/ACT inhaler, Inhale 2 puffs into the lungs every 4 hours as needed for shortness of breath or wheezing., Disp: 18 g, Rfl: 2    atenolol (TENORMIN) 25 MG tablet, Take 1 tablet by mouth daily., Disp: , Rfl:     bisoprolol (ZEBETA) 5 MG tablet, 5 mg tablet , Pt taking 2.5 mg PO daily, Disp: 45 tablet, Rfl: 1    bisoprolol (ZEBETA) 5 MG tablet, Take 0.5 tablets (2.5 mg) by mouth daily., Disp: , Rfl:     budesonide-formoterol (SYMBICORT) 160-4.5 MCG/ACT Inhaler, Inhale 2 puffs into the lungs 2 times daily., Disp: 10.2 g, Rfl: 3    Cholecalciferol (D3 HIGH POTENCY) 25 MCG (1000 UT) CAPS, TAKE 2 CAPSULES (2000IU) BY MOUTH ONCE DAILY, Disp: 62 capsule, Rfl: 11    DULoxetine (CYMBALTA) 60 MG capsule, 120 mg at bedtime, Disp: 90 capsule, Rfl: 0    galcanezumab-gnlm (EMGALITY) 120 MG/ML injection, Inject 120 mg Subcutaneous every 28 days, Disp: , Rfl:     medical cannabis (Patient's own supply), (The purpose of this order is to document that the patient reports taking medical cannabis.  This is not a prescription, and is not used to certify that the patient has  "a qualifying medical condition.),  Capsules and vaping once daily, Disp: 0 Information only, Rfl: 0    midodrine (PROAMATINE) 5 MG tablet, 1 tablet in AM and 1 tablet at dinner time, Disp: 60 tablet, Rfl: 3    multivitamin w/minerals (THERA-VIT-M) tablet, Take 1 tablet by mouth daily, Disp: 90 tablet, Rfl: 3    Omega-3 Fatty Acids (FISH OIL) 1200 MG capsule, TAKE 1 CAPSULE BY MOUTH AT BEDTIME, Disp: 31 capsule, Rfl: 11    OXcarbazepine (TRILEPTAL) 150 MG tablet, Take 150 mg by mouth, Disp: , Rfl:     Risankizumab-rzaa (SKYRIZI) 150 MG/ML subcutaneous, Inject 1ml SC x 1 on week 0, 4, then q12 weeks., Disp: , Rfl:     rizatriptan (MAXALT) 5 MG tablet, at onset of headache, Disp: , Rfl:     rizatriptan (MAXALT-MLT) 5 MG ODT, Take 1 tablet (5 mg) by mouth at onset of headache for migraine. May repeat in 2 hours. Max 6 tablets/24 hours., Disp: 12 tablet, Rfl: 1    spacer (OPTICHAMBER NAHID) holding chamber, Use with albuterol inhaler, Disp: , Rfl:     study - emtricitabine-tenofovir 200-300, IDS#[id number], (TRUVADA) per tablet, Take 1 tablet by mouth daily, Disp: 30 tablet, Rfl:     Current Facility-Administered Medications:     Botulinum Toxin Type A (BOTOX) 200 units injection 195 Units, 195 Units, Intramuscular, Q90 Days,   Allergies   Allergen Reactions    Metoprolol Other (See Comments)     nightmares    Miconazole Other (See Comments)    Adhesive Tape Rash     Topical creams: neosporin and bacitracin    Bacitracin-Polymyxin B GI Disturbance, Rash, Dermatitis, Nausea and Other (See Comments)    Covid-19 (Mrna) Vaccine Rash     On the booster vaccine.    On the booster vaccine.   On the booster vaccine.   On the booster vaccine.    On the booster vaccine.   On the booster vaccine.    Neomycin-Bacitracin Zn-Polymyx Rash         EXAM:   /87   Pulse 100   Ht 1.778 m (5' 10\")   Wt 83 kg (183 lb)   SpO2 95%   BMI 26.26 kg/m      Constitutional:       General: He is not in acute distress.     Appearance: Normal " appearance. He is not ill-appearing.   HENT:      Head: Normocephalic and atraumatic.      Nose: Nose normal. No congestion or rhinorrhea.      Mouth/Throat:      Mouth: Mucous membranes are moist.      Pharynx: Oropharynx is clear. No posterior oropharyngeal erythema.   Eyes:      General:         Right eye: No discharge.         Left eye: No discharge.   Cardiovascular:      Rate and Rhythm: Normal rate and regular rhythm.      Heart sounds: Normal heart sounds.   Pulmonary:      Effort: Pulmonary effort is normal.      Breath sounds: Normal breath sounds. No wheezing or rhonchi.   Skin:     General: Skin is warm.      Findings: No erythema or rash.   Neurological:      General: No focal deficit present.      Mental Status: He is alert. Mental status is at baseline.   Psychiatric:         Mood and Affect: Mood normal.         Behavior: Behavior normal.        ASSESSMENT/PLAN:  Juarez Jauregui is a 39 year old male seen today for evaluation of possible asthma.  Having symptoms consistent with asthma with shortness of breath and chest tightness and wheezing responding to albuterol within the appropriate timeframe.  He has allergic rhinitis and has received allergy shots.    Will order lung function testing to further evaluate and prescribe Symbicort.    Symbicort 2 puffs twice daily x 2 weeks, then may use every 6 hours as needed (instead of Albuterol). Rinse mouth afterwards.  Breathing tests soon.      Follow-up to be determined      Thank you for allowing me to participate in the care of Juarez Jauregui.      I spent 35 minutes on the date of the encounter doing chart review, history and exam, documentation and further coordination as noted above exclusive of separately reported interpretations    Alberto Kauffman MD  Allergy/Immunology  [facility]

## 2025-01-13 ENCOUNTER — THERAPY VISIT (OUTPATIENT)
Dept: PHYSICAL THERAPY | Facility: CLINIC | Age: 40
End: 2025-01-13
Payer: COMMERCIAL

## 2025-01-13 DIAGNOSIS — R26.81 UNSTEADINESS: ICD-10-CM

## 2025-01-13 DIAGNOSIS — F07.81 POST CONCUSSIVE SYNDROME: ICD-10-CM

## 2025-01-13 DIAGNOSIS — R42 DIZZINESS: Primary | ICD-10-CM

## 2025-01-13 DIAGNOSIS — M54.2 CERVICALGIA: ICD-10-CM

## 2025-01-13 PROCEDURE — 97112 NEUROMUSCULAR REEDUCATION: CPT | Mod: GP | Performed by: PHYSICAL THERAPIST

## 2025-01-13 PROCEDURE — 97110 THERAPEUTIC EXERCISES: CPT | Mod: GP | Performed by: PHYSICAL THERAPIST

## 2025-01-13 NOTE — PATIENT INSTRUCTIONS
Physical Therapy Exercise 1/13/25  -Try to shoot for 15-20 minutes of continuous cardiovascular activity each day.       Dizzy Exercises (2x/day)   1. )  a corner for safety with either your walker or a sturdy chair in front of you.  Try not to touch anything for balance.  a.) Stand with eyes closed for 60 seconds and feet apart on 1-2 pillows     2. ) Head movements-  a corner with a sturdy chair in front and feet hip width apart.  Keep your eyes open as you move your head  a.) look left/right 5-15 times  b.) look up/down 5-15 times  Exertion exercises (only 1x/day)  3.) High Knee Marches in place-  March in place for 60 seconds.  Do this by a counter for safety but only touch if you need to  4.) Butt Kicks-  place and perform butt kicks for 60 seconds.  Do this by a counter for safety but only touch if you need to  5.) Side step with water bottle tap- Set to water bottles at the end of your bed.  Side step to the water bottle then tap it with your opposite hand.  Repeat moving to the other water bottle.  Repeat for 10-20 taps total.

## 2025-01-16 ENCOUNTER — OFFICE VISIT (OUTPATIENT)
Dept: PULMONOLOGY | Facility: CLINIC | Age: 40
End: 2025-01-16
Payer: COMMERCIAL

## 2025-01-16 DIAGNOSIS — R06.02 SHORTNESS OF BREATH: ICD-10-CM

## 2025-01-16 DIAGNOSIS — R06.2 WHEEZING: ICD-10-CM

## 2025-01-16 LAB
EXPTIME-PRE: 7.71 SEC
FEF2575-%PRED-POST: 100 %
FEF2575-%PRED-PRE: 53 %
FEF2575-POST: 3.94 L/SEC
FEF2575-PRE: 2.08 L/SEC
FEF2575-PRED: 3.91 L/SEC
FEFMAX-%PRED-PRE: 80 %
FEFMAX-PRE: 8.23 L/SEC
FEFMAX-PRED: 10.18 L/SEC
FEV1-%PRED-PRE: 87 %
FEV1-PRE: 3.47 L
FEV1FEV6-PRE: 69 %
FEV1FEV6-PRED: 82 %
FEV1FVC-PRE: 67 %
FEV1FVC-PRED: 82 %
FIFMAX-PRE: 8.02 L/SEC
FVC-%PRED-PRE: 106 %
FVC-PRE: 5.15 L
FVC-PRED: 4.84 L
PULMONARY FUNCTION TEST-FENO: 52.5 PPB (ref 0–40)

## 2025-01-16 NOTE — PROGRESS NOTES
Juarez Jauregui comes into clinic today at the request of Dr. Kauffman,  Ordering Provider for spirometry  and FENO      This service provided today was under the supervising provider of the day Dr. Kauffman, who was available if needed.    Casimiro Gutierrez, RT

## 2025-01-20 ENCOUNTER — THERAPY VISIT (OUTPATIENT)
Dept: PHYSICAL THERAPY | Facility: CLINIC | Age: 40
End: 2025-01-20
Payer: COMMERCIAL

## 2025-01-20 DIAGNOSIS — F07.81 POST CONCUSSIVE SYNDROME: ICD-10-CM

## 2025-01-20 DIAGNOSIS — R26.81 UNSTEADINESS: ICD-10-CM

## 2025-01-20 DIAGNOSIS — M54.2 CERVICALGIA: ICD-10-CM

## 2025-01-20 DIAGNOSIS — R42 DIZZINESS: Primary | ICD-10-CM

## 2025-01-20 PROCEDURE — 97110 THERAPEUTIC EXERCISES: CPT | Mod: GP | Performed by: PHYSICAL THERAPIST

## 2025-01-20 PROCEDURE — 97112 NEUROMUSCULAR REEDUCATION: CPT | Mod: GP | Performed by: PHYSICAL THERAPIST

## 2025-01-20 NOTE — PATIENT INSTRUCTIONS
Physical Therapy Exercise 1/20/25  -Try to shoot for 15-20 minutes of continuous cardiovascular activity each day.       Dizzy Exercises (2x/day)   1. )  a corner for safety with either your walker or a sturdy chair in front of you.  Try not to touch anything for balance.  a.) Stand with eyes closed for 60 seconds and feet together on 1-2 pillows      2. ) Head movements-  a corner with a sturdy chair in front and feet hip width apart on a pillow.  Keep your eyes closed as you move your head.  a.) look left/right 15 times  b.) look up/down 5-15 times    Exertion exercises (only 1x/day)  3.) High Knee Marches Running in place-  March in place for 30-60 seconds.  Do this by a counter for safety but only touch if you need to  4.) Sit to Stand- Try to do 10-15 of these as fast as you safely can  5.) Side step with water bottle tap- Set to water bottles at the end of your bed.  Side step to the water bottle then tap it with your opposite hand.  Repeat moving to the other water bottle.  Repeat for 10-20 taps total.

## 2025-01-27 ENCOUNTER — THERAPY VISIT (OUTPATIENT)
Dept: PHYSICAL THERAPY | Facility: CLINIC | Age: 40
End: 2025-01-27
Payer: COMMERCIAL

## 2025-01-27 DIAGNOSIS — R26.81 UNSTEADINESS: ICD-10-CM

## 2025-01-27 DIAGNOSIS — R42 DIZZINESS: Primary | ICD-10-CM

## 2025-01-27 DIAGNOSIS — M54.2 CERVICALGIA: ICD-10-CM

## 2025-01-27 DIAGNOSIS — F07.81 POST CONCUSSIVE SYNDROME: ICD-10-CM

## 2025-01-27 PROCEDURE — 97110 THERAPEUTIC EXERCISES: CPT | Mod: GP | Performed by: PHYSICAL THERAPIST

## 2025-01-27 PROCEDURE — 97112 NEUROMUSCULAR REEDUCATION: CPT | Mod: GP | Performed by: PHYSICAL THERAPIST

## 2025-01-27 NOTE — PATIENT INSTRUCTIONS
Physical Therapy Exercise 1/27/25  -Try to shoot for 15-20 minutes of continuous cardiovascular activity each day.       Dizzy Exercises (2x/day)   1. ) Walking and moving you head: repeat this exercises for 100ft each direction   a.) Walking look with your head and eyes to the right for 3 steps, then look center for 3 steps, look with head and eyes to the left for 3 steps, then look center for 3 steps.  Make sure you are walking your normal walking speed  b.) Walking look with your head and eyes up for 3 steps, then look center for 3 steps, look with head and eyes down for 3 steps, then look center for 3 steps.  Make sure you are walking your normal walking speed     2. ) Head movements-  a corner with a sturdy chair in front and feet 2 inches apart on a pillow.  Keep your eyes closed as you move your head.  Work on increasing speed of head movement  a.) look left/right 15 times  b.) look up/down 15 times     Exertion exercises (only 1x/day)  3.) High Knee Marches Running in place-  March in place for 30-60 seconds.  Do this by a counter for safety but only touch if you need to  4.) Sit to Stand- Try to do 10-15 of these as fast as you safely can  5.) Side step with water bottle tap- Set to water bottles at the end of your bed.  Side step to the water bottle then tap it with your opposite hand.  Repeat moving to the other water bottle.  Repeat for 10-20 taps total.

## 2025-02-03 ENCOUNTER — THERAPY VISIT (OUTPATIENT)
Dept: PHYSICAL THERAPY | Facility: CLINIC | Age: 40
End: 2025-02-03
Payer: COMMERCIAL

## 2025-02-03 DIAGNOSIS — F07.81 POST CONCUSSIVE SYNDROME: ICD-10-CM

## 2025-02-03 DIAGNOSIS — R42 DIZZINESS: Primary | ICD-10-CM

## 2025-02-03 DIAGNOSIS — R26.81 UNSTEADINESS: ICD-10-CM

## 2025-02-03 DIAGNOSIS — M54.2 CERVICALGIA: ICD-10-CM

## 2025-02-03 PROCEDURE — 97140 MANUAL THERAPY 1/> REGIONS: CPT | Mod: GP | Performed by: PHYSICAL THERAPIST

## 2025-02-03 PROCEDURE — 97112 NEUROMUSCULAR REEDUCATION: CPT | Mod: GP | Performed by: PHYSICAL THERAPIST

## 2025-02-03 NOTE — PATIENT INSTRUCTIONS
Physical Therapy Exercise 02/03/25  -Try to shoot for 15-20 minutes of continuous cardiovascular activity each day.       Dizzy Exercises (2x/day)   1. ) Walking and moving you head: repeat this exercises for 100ft each direction   a.) Walking look with your head and eyes to the right for 3 steps then look with head and eyes to the left for 3 steps. Make sure you are walking your normal walking speed  b.) Walking look with your head and eyes up for 3 step, look with head and eyes down for 3 steps.  Make sure you are walking your normal walking speed     2. ) Head movements-  a corner with a sturdy chair in front and feet 2 inches apart on a pillow.  Keep your eyes closed as you move your head.  Work on increasing speed of head movement  a.) look left/right 15 times  b.) look up/down 15 times     Exertion exercises (only 1x/day)  3.) High Knee Marches Running in place-  March in place for 30-60 seconds.  Do this by a counter for safety but only touch if you need to  4.) Sit to Stand- Try to do 10-15 of these as fast as you safely can  5.) Side step with water bottle tap- Set to water bottles at the end of your bed.  Side step to the water bottle then tap it with your opposite hand.  Repeat moving to the other water bottle.  Repeat for 10-20 taps total.

## 2025-02-10 ENCOUNTER — THERAPY VISIT (OUTPATIENT)
Dept: PHYSICAL THERAPY | Facility: CLINIC | Age: 40
End: 2025-02-10
Payer: COMMERCIAL

## 2025-02-10 DIAGNOSIS — R26.81 UNSTEADINESS: ICD-10-CM

## 2025-02-10 DIAGNOSIS — F07.81 POST CONCUSSIVE SYNDROME: ICD-10-CM

## 2025-02-10 DIAGNOSIS — R42 DIZZINESS: Primary | ICD-10-CM

## 2025-02-10 DIAGNOSIS — M54.2 CERVICALGIA: ICD-10-CM

## 2025-02-10 PROCEDURE — 97750 PHYSICAL PERFORMANCE TEST: CPT | Mod: GP | Performed by: PHYSICAL THERAPIST

## 2025-02-10 PROCEDURE — 97112 NEUROMUSCULAR REEDUCATION: CPT | Mod: GP | Performed by: PHYSICAL THERAPIST

## 2025-02-10 NOTE — PATIENT INSTRUCTIONS
Physical Therapy Exercise 02/10/25  -Try to shoot for 15-20 minutes of continuous cardiovascular activity each day.       Dizzy Exercises (2x/day)   1. ) Walking and moving you head: repeat this exercises for 100ft each direction   a.) Walking look with your head and eyes to the right for 3 steps then look with head and eyes to the left for 3 steps. Make sure you are walking your normal walking speed  b.) Walking look with your head and eyes up for 3 step, look with head and eyes down for 3 steps.  Make sure you are walking your normal walking speed  2. ) Head movements-  a corner with a sturdy chair in front and feet 2 inches apart on a pillow.  Keep your eyes closed as you move your head.  Work on increasing speed of head movement  a.) look left/right 15 times  b.) look up/down 15 times     Exertion exercises (only 1x/day)  3.) High Knee Marches Running in place-  March in place for 30-60 seconds.  Do this by a counter for safety but only touch if you need to  4.) Squat with floor tap then overhead reach- Try to do 10-15 of these as fast as you safely can.   So Big   5.) Side step with water bottle tap- Set to water bottles at the end of your bed.  Side step to the water bottle then tap it with your opposite hand.  Repeat moving to the other water bottle.  Repeat for 10-20 taps total.
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
## 2025-02-17 ENCOUNTER — THERAPY VISIT (OUTPATIENT)
Dept: PHYSICAL THERAPY | Facility: CLINIC | Age: 40
End: 2025-02-17
Payer: COMMERCIAL

## 2025-02-17 DIAGNOSIS — M54.2 CERVICALGIA: ICD-10-CM

## 2025-02-17 DIAGNOSIS — R42 DIZZINESS: Primary | ICD-10-CM

## 2025-02-17 DIAGNOSIS — R26.81 UNSTEADINESS: ICD-10-CM

## 2025-02-17 DIAGNOSIS — F07.81 POST CONCUSSIVE SYNDROME: ICD-10-CM

## 2025-02-17 PROCEDURE — 97110 THERAPEUTIC EXERCISES: CPT | Mod: GP | Performed by: PHYSICAL THERAPIST

## 2025-02-17 PROCEDURE — 97112 NEUROMUSCULAR REEDUCATION: CPT | Mod: GP | Performed by: PHYSICAL THERAPIST

## 2025-02-17 NOTE — PATIENT INSTRUCTIONS
Physical Therapy Exercise 02/17/25  -Try to shoot for 15-20 minutes of continuous cardiovascular activity each day.       Dizzy Exercises (2x/day)   1. ) Walking and moving you head: repeat this exercises for 100ft each direction   a.) Walking look with your head and eyes to the right for 2 steps then look with head and eyes to the left for 2 steps. Make sure you are walking your normal walking speed  b.) Walking look with your head and eyes up for 2 step, look with head and eyes down for 2 steps.  Make sure you are walking your normal walking speed     2. ) Head movements-  a corner with a sturdy chair in front and feet 2 inches apart on a pillow.  Keep your eyes closed as you move your head.  Work on increasing speed of head movement  a.) look left/right 15 times  b.) look up/down 15 times    3.) Walking with 180 degree turns: Walk for 4 steps then turn 180 degrees then walk forward for 4 steps then turn the other direction.  Repeat for 10 turns total.  Tip: turn towards the same wall to naturally alternate your turns.       Exertion exercises (only 1x/day)  4.)  Floor taps with upward jump- perform 6-10 reps    5.) Squat with 90 degree turns- Perform 12 squats turning 90 degree turns after each one.  Only perform 3 squats with turns to each side before switching.    6.) Lateral jump with water bottle tap- Set to water bottles at the end of your bed.  Jump to the side and tap the water bottle it with your opposite hand.  Repeat moving to the other water bottle.  Repeat for 10-20 taps total.

## 2025-02-20 ENCOUNTER — TELEPHONE (OUTPATIENT)
Dept: PHYSICAL MEDICINE AND REHAB | Facility: CLINIC | Age: 40
End: 2025-02-20
Payer: COMMERCIAL
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
## 2025-02-20 NOTE — TELEPHONE ENCOUNTER
Writer requested return call to clarify patient's intention for visit scheduled with Dr. St. Patient's appointment is categorized as New PMR, but patient has seen Dr. Sierra previously with most recent visit on 02/07/25 for Botox injections. Voice message was left. Supriya Shin RN on 2/20/2025 at 3:28 PM

## 2025-03-12 ENCOUNTER — THERAPY VISIT (OUTPATIENT)
Dept: PHYSICAL THERAPY | Facility: CLINIC | Age: 40
End: 2025-03-12
Payer: COMMERCIAL

## 2025-03-12 DIAGNOSIS — R26.81 UNSTEADINESS: ICD-10-CM

## 2025-03-12 DIAGNOSIS — R42 DIZZINESS: ICD-10-CM

## 2025-03-12 DIAGNOSIS — F07.81 POST CONCUSSIVE SYNDROME: Primary | ICD-10-CM

## 2025-03-12 PROCEDURE — 97112 NEUROMUSCULAR REEDUCATION: CPT | Mod: GP | Performed by: PHYSICAL THERAPIST

## 2025-03-17 ENCOUNTER — THERAPY VISIT (OUTPATIENT)
Dept: PHYSICAL THERAPY | Facility: CLINIC | Age: 40
End: 2025-03-17
Payer: COMMERCIAL

## 2025-03-17 DIAGNOSIS — R26.81 UNSTEADINESS: ICD-10-CM

## 2025-03-17 DIAGNOSIS — R42 DIZZINESS: ICD-10-CM

## 2025-03-17 DIAGNOSIS — F07.81 POST CONCUSSIVE SYNDROME: Primary | ICD-10-CM

## 2025-03-17 PROCEDURE — 97112 NEUROMUSCULAR REEDUCATION: CPT | Mod: GP | Performed by: PHYSICAL THERAPIST

## 2025-03-18 ENCOUNTER — MYC MEDICAL ADVICE (OUTPATIENT)
Dept: PHYSICAL MEDICINE AND REHAB | Facility: CLINIC | Age: 40
End: 2025-03-18
Payer: COMMERCIAL

## 2025-03-18 NOTE — TELEPHONE ENCOUNTER
Following up on conversation with pt on Feb 20    Last botox injections were done Feb 7, 2025    Pt is currently booked to repeat Botox on June 6  Botox may be repeated May 2 or later      Calling to offer Monday May 12 at 10:15 /or/  Friday May 16 at 2:15 pm      Left voice mail to Ricardo to read his Mychart to see the two options for moving his injections closer to the due date.    Lulu Tomlinson RN on 3/18/2025 at 2:36 PM

## 2025-03-24 ENCOUNTER — THERAPY VISIT (OUTPATIENT)
Dept: PHYSICAL THERAPY | Facility: CLINIC | Age: 40
End: 2025-03-24
Payer: COMMERCIAL

## 2025-03-24 DIAGNOSIS — R42 DIZZINESS: ICD-10-CM

## 2025-03-24 DIAGNOSIS — F07.81 POST CONCUSSIVE SYNDROME: Primary | ICD-10-CM

## 2025-03-24 DIAGNOSIS — R26.81 UNSTEADINESS: ICD-10-CM

## 2025-03-24 PROCEDURE — 97750 PHYSICAL PERFORMANCE TEST: CPT | Mod: GP | Performed by: PHYSICAL THERAPIST

## 2025-03-24 PROCEDURE — 97112 NEUROMUSCULAR REEDUCATION: CPT | Mod: GP | Performed by: PHYSICAL THERAPIST

## 2025-03-24 NOTE — PROGRESS NOTES
"   03/24/25 1400   Concussion Clinical Profiles Screen - Symptoms   1. Feeling sad 1   2. Headache when you wake up 3   3. Difficulty or headache when looking at a phone or computer screen 0   4. Dizziness when you move your head 1   5. Difficulty turning off your thoughts (e.g. rumination) 3   6. Headache with nausea or upset stomach 3   7. Trouble focusing your eyes while reading 2   8. Frontal headache 0   9. Difficulty or discomfort in busy environments 2   10. Constantly thinking about your symptoms 3   11. Headache with sensitivity to light or noise 3   12. Feeling motion sick (\"sea or car sick\") 0   13. Feeling more tired at the end of the day 1   14. Blurry or double vision 1   15. Feeling or sensation of slow wavy dizziness (i.e., lightheadedness) 1   16. Neck pain or stiffness 1   17. Sleeping more than usual 1   18. Sleeping less than usual 0   19. Eye strain (eyes feel tired) during visual activities 1   20. Visual aura (e.g., flashes, stars, spots, flickering light) with or without headache 0   21. Feeling or sensation of fast spinning dizziness (i.e., vertigo) 0   22. Difficulty falling asleep 0   23. Difficulty staying asleep 0   24. Trouble remembering things (e.g., what you completed today or having to re-read information) 1   25. Difficulty moving your neck 0   26. Feeling nervous or anxious 3   27. Increased headache following physical activity 2   28. Increased headache following cognitive activity 1   29. Feeling more stressed than usual 3   Profile Scores   Anxiety/Mood Raw Score 13   Anxiety/Mood Average Score 2.6   Cognitive/Fatigue Raw Score 3   Cognitive/Fatigue Average Score 1   Migraine Raw Score 11   Migraine Average Score 2.2   Ocular Raw Score 4   Ocular Average Score 0.8   Vestibular Raw Score 4   Vestibular Average Score 0.8   Modifier Scores   Sleep Raw Score 1   Sleep Average Score 0.3   Neck Raw Score 1   Neck Average Score 0.5   CP Screen Total Score   CP Screen Total Raw Score " 37

## 2025-04-01 NOTE — TELEPHONE ENCOUNTER
Pt has not read mychart sent to him two weeks ago & no return call back.    Left second message to call writer back if he is interested in having his botox injection appt moved closer to the due date  (call center cannot offer the time being offered).    Call back to [phone]    Lulu Tomlinson RN on 4/1/2025 at 10:36 AM

## 2025-04-07 NOTE — TELEPHONE ENCOUNTER
Pt left voice mail back to writer to ask about the times of the appointments on May 12 or May 19    Called back to let him know all information was given in the [employer] message two weeks ago - he may call back or [facility] his [email or web address]    Lulu Tomlinson RN on 4/7/2025 at 9:37 AM'

## 2025-04-08 NOTE — TELEPHONE ENCOUNTER
Spoke to Ricardo, he will come in for next set of injections on May 12 (appt booked)  Added next two cycles appts as well for August 4 and October 27 (Mondays) at 11 am which was pt's preference    Explained to Ricardo that at each visit he should add another Botox appt to his schedule to maintain future 12 week cycle scheduling so the appts stay within a reasonable time frame for his plan of care. Pt verbalized understanding (he was previously established with [facility] provider and receiving q 12 week Botox

## 2025-05-08 ENCOUNTER — THERAPY VISIT (OUTPATIENT)
Dept: PHYSICAL THERAPY | Facility: CLINIC | Age: 40
End: 2025-05-08
Payer: COMMERCIAL

## 2025-05-08 DIAGNOSIS — R42 DIZZINESS: ICD-10-CM

## 2025-05-08 DIAGNOSIS — R26.81 UNSTEADINESS: ICD-10-CM

## 2025-05-08 DIAGNOSIS — F07.81 POST CONCUSSIVE SYNDROME: Primary | ICD-10-CM

## 2025-05-08 PROCEDURE — 97112 NEUROMUSCULAR REEDUCATION: CPT | Mod: GP | Performed by: PHYSICAL THERAPIST
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
## 2025-05-12 ENCOUNTER — OFFICE VISIT (OUTPATIENT)
Dept: PHYSICAL MEDICINE AND REHAB | Facility: CLINIC | Age: 40
End: 2025-05-12
Payer: COMMERCIAL

## 2025-05-12 VITALS — DIASTOLIC BLOOD PRESSURE: 80 MMHG | SYSTOLIC BLOOD PRESSURE: 113 MMHG | HEART RATE: 86 BPM | OXYGEN SATURATION: 95 %

## 2025-05-12 DIAGNOSIS — G43.E01 CHRONIC MIGRAINE WITH AURA AND WITH STATUS MIGRAINOSUS, NOT INTRACTABLE: Primary | ICD-10-CM

## 2025-05-12 DIAGNOSIS — S06.9X0S TRAUMATIC BRAIN INJURY, WITHOUT LOSS OF CONSCIOUSNESS, SEQUELA: ICD-10-CM

## 2025-05-12 PROCEDURE — 3079F DIAST BP 80-89 MM HG: CPT | Performed by: PHYSICAL MEDICINE & REHABILITATION

## 2025-05-12 PROCEDURE — 95874 GUIDE NERV DESTR NEEDLE EMG: CPT | Performed by: PHYSICAL MEDICINE & REHABILITATION

## 2025-05-12 PROCEDURE — 64615 CHEMODENERV MUSC MIGRAINE: CPT | Performed by: PHYSICAL MEDICINE & REHABILITATION

## 2025-05-12 PROCEDURE — 99215 OFFICE O/P EST HI 40 MIN: CPT | Mod: 25 | Performed by: PHYSICAL MEDICINE & REHABILITATION

## 2025-05-12 PROCEDURE — 3074F SYST BP LT 130 MM HG: CPT | Performed by: PHYSICAL MEDICINE & REHABILITATION

## 2025-05-12 RX ORDER — DOXYCYCLINE HYCLATE 100 MG
100 TABLET ORAL PRN
COMMUNITY
Start: 2025-05-02 | End: 2025-07-31
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
## 2025-05-20 ENCOUNTER — HOSPITAL ENCOUNTER (OUTPATIENT)
Dept: CARDIOLOGY | Facility: CLINIC | Age: 40
Discharge: HOME | End: 2025-05-20
Attending: INTERNAL MEDICINE
Payer: COMMERCIAL

## 2025-05-20 DIAGNOSIS — Q25.43 AORTIC ROOT ANEURYSM: ICD-10-CM

## 2025-05-20 LAB — LVEF ECHO: NORMAL

## 2025-05-20 PROCEDURE — 999N000208 ECHOCARDIOGRAM COMPLETE

## 2025-05-20 PROCEDURE — 255N000002 HC RX 255 OP 636: Performed by: INTERNAL MEDICINE

## 2025-05-20 RX ADMIN — HUMAN ALBUMIN MICROSPHERES AND PERFLUTREN 3 ML: 10; .22 INJECTION, SOLUTION INTRAVENOUS at 15:05

## (undated) DEVICE — EYE PREP BETADINE 5% SOLUTION 30ML 0065-0411-30

## (undated) DEVICE — DRSG GAUZE 4X4" TRAY 6939

## (undated) DEVICE — SU CHROMIC 4-0 M-1DA 744G

## (undated) DEVICE — ADH SKIN CLOSURE PREMIERPRO EXOFIN 1.0ML 3470

## (undated) DEVICE — SPONGE SURGIFOAM 100 1974

## (undated) DEVICE — SU ETHILON 3-0 PS-1 18" 1663H

## (undated) DEVICE — PACK NEURO MINOR UMMC SNE32MNMU4

## (undated) DEVICE — TAPE DURAPORE 1"X1.5YD SILK 1538S-1

## (undated) DEVICE — SU CHROMIC 4-0 RB-1 27" U203H

## (undated) DEVICE — SPONGE COTTONOID 1/2X3" 20-07S

## (undated) DEVICE — LINEN TOWEL PACK X5 5464

## (undated) DEVICE — STRAP UNIVERSAL POSITIONING 2-PIECE 4X47X76" 91-287

## (undated) DEVICE — NDL 25GA 5/8" 305122

## (undated) DEVICE — PREP SKIN SCRUB TRAY 4461A

## (undated) DEVICE — SU PLAIN 4-0 SC-1 18" 1824H

## (undated) DEVICE — SPLINT AQUAPLAST 6X9"

## (undated) DEVICE — SOL NACL 0.9% IRRIG 1000ML BOTTLE 2F7124

## (undated) DEVICE — APPLICATOR COTTON TIP 3" 9325

## (undated) DEVICE — BLADE KNIFE SURG 11 371111

## (undated) DEVICE — SYR 10ML FINGER CONTROL W/O NDL 309695

## (undated) DEVICE — ESU NDL COLORADO MICRO E1651

## (undated) DEVICE — BLADE KNIFE SURG 15 371115

## (undated) DEVICE — SU MONOCRYL 6-0 P-3 18" UND Y492G

## (undated) DEVICE — DRSG STERI STRIP 1/4X4" R1546

## (undated) DEVICE — SUCTION MANIFOLD NEPTUNE 2 SYS 4 PORT 0702-020-000

## (undated) DEVICE — SYR 01ML 27GA 0.5" NDL TBC 309623

## (undated) DEVICE — SPONGE COTTONOID 1/2X3" 80-1407

## (undated) DEVICE — SU PROLENE 2-0 V-7 36" 8977H

## (undated) DEVICE — DRSG TELFA 3X8" 1238

## (undated) DEVICE — SU PDS II 5-0 RB-1 DA 30" Z320H

## (undated) DEVICE — Device

## (undated) DEVICE — SPLINT NASAL DOYLE BREATHEASY 20-10500

## (undated) DEVICE — SU PLAIN FAST ABSORB 5-0 PC-1 18" 1915G

## (undated) DEVICE — DRAPE SHEET REV FOLD 3/4 9349

## (undated) DEVICE — ADH LIQUID MASTISOL TOPICAL VIAL 2-3ML 0523-48

## (undated) DEVICE — ESU PENCIL SMOKE EVAC W/ROCKER SWITCH 0703-047-000

## (undated) DEVICE — SURGICEL HEMOSTAT 4X8" 1952

## (undated) RX ORDER — HYDROMORPHONE HYDROCHLORIDE 1 MG/ML
INJECTION, SOLUTION INTRAMUSCULAR; INTRAVENOUS; SUBCUTANEOUS
Status: DISPENSED
Start: 2021-08-31

## (undated) RX ORDER — METOPROLOL TARTRATE 50 MG
TABLET ORAL
Status: DISPENSED
Start: 2023-05-17

## (undated) RX ORDER — HYDROMORPHONE HCL IN WATER/PF 6 MG/30 ML
PATIENT CONTROLLED ANALGESIA SYRINGE INTRAVENOUS
Status: DISPENSED
Start: 2021-08-31

## (undated) RX ORDER — FENTANYL CITRATE 50 UG/ML
INJECTION, SOLUTION INTRAMUSCULAR; INTRAVENOUS
Status: DISPENSED
Start: 2021-08-31

## (undated) RX ORDER — ACETAMINOPHEN 325 MG/1
TABLET ORAL
Status: DISPENSED
Start: 2021-08-31

## (undated) RX ORDER — OXYMETAZOLINE HYDROCHLORIDE 0.05 G/100ML
SPRAY NASAL
Status: DISPENSED
Start: 2021-08-31

## (undated) RX ORDER — LIDOCAINE HYDROCHLORIDE AND EPINEPHRINE 10; 10 MG/ML; UG/ML
INJECTION, SOLUTION INFILTRATION; PERINEURAL
Status: DISPENSED
Start: 2021-08-31

## (undated) RX ORDER — METOPROLOL TARTRATE 1 MG/ML
INJECTION, SOLUTION INTRAVENOUS
Status: DISPENSED
Start: 2023-05-17

## (undated) RX ORDER — CEFAZOLIN SODIUM 2 G/100ML
INJECTION, SOLUTION INTRAVENOUS
Status: DISPENSED
Start: 2021-08-31

## (undated) RX ORDER — HYDRALAZINE HYDROCHLORIDE 20 MG/ML
INJECTION INTRAMUSCULAR; INTRAVENOUS
Status: DISPENSED
Start: 2021-08-31

## (undated) RX ORDER — ONDANSETRON 2 MG/ML
INJECTION INTRAMUSCULAR; INTRAVENOUS
Status: DISPENSED
Start: 2021-08-31

## (undated) RX ORDER — DEXAMETHASONE SODIUM PHOSPHATE 4 MG/ML
INJECTION, SOLUTION INTRA-ARTICULAR; INTRALESIONAL; INTRAMUSCULAR; INTRAVENOUS; SOFT TISSUE
Status: DISPENSED
Start: 2021-08-31

## (undated) RX ORDER — IVABRADINE 5 MG/1
TABLET, FILM COATED ORAL
Status: DISPENSED
Start: 2023-05-17

## (undated) RX ORDER — COCAINE HYDROCHLORIDE 40 MG/ML
SOLUTION NASAL
Status: DISPENSED
Start: 2021-08-31

## (undated) RX ORDER — BUPIVACAINE HYDROCHLORIDE 5 MG/ML
INJECTION, SOLUTION EPIDURAL; INTRACAUDAL
Status: DISPENSED
Start: 2021-08-31

## (undated) RX ORDER — LIDOCAINE HYDROCHLORIDE 10 MG/ML
INJECTION, SOLUTION EPIDURAL; INFILTRATION; INTRACAUDAL; PERINEURAL
Status: DISPENSED
Start: 2019-02-21

## (undated) RX ORDER — MUPIROCIN 20 MG/G
OINTMENT TOPICAL
Status: DISPENSED
Start: 2021-08-31

## (undated) RX ORDER — LIDOCAINE HYDROCHLORIDE 20 MG/ML
INJECTION, SOLUTION EPIDURAL; INFILTRATION; INTRACAUDAL; PERINEURAL
Status: DISPENSED
Start: 2021-08-31

## (undated) RX ORDER — PROPOFOL 10 MG/ML
INJECTION, EMULSION INTRAVENOUS
Status: DISPENSED
Start: 2021-08-31